# Patient Record
Sex: MALE | Race: WHITE | NOT HISPANIC OR LATINO | Employment: OTHER | URBAN - METROPOLITAN AREA
[De-identification: names, ages, dates, MRNs, and addresses within clinical notes are randomized per-mention and may not be internally consistent; named-entity substitution may affect disease eponyms.]

---

## 2017-01-27 DIAGNOSIS — I35.0 AORTIC VALVE STENOSIS, UNSPECIFIED ETIOLOGY: Primary | ICD-10-CM

## 2017-04-25 ENCOUNTER — HOSPITAL ENCOUNTER (OUTPATIENT)
Dept: CARDIOLOGY | Facility: CLINIC | Age: 67
Discharge: HOME | End: 2017-04-25
Payer: MEDICARE

## 2017-04-25 ENCOUNTER — OFFICE VISIT (OUTPATIENT)
Dept: CARDIOLOGY | Facility: CLINIC | Age: 67
End: 2017-04-25
Payer: MEDICARE

## 2017-04-25 VITALS
HEART RATE: 58 BPM | SYSTOLIC BLOOD PRESSURE: 140 MMHG | DIASTOLIC BLOOD PRESSURE: 82 MMHG | BODY MASS INDEX: 29.33 KG/M2 | WEIGHT: 198 LBS | HEIGHT: 69 IN

## 2017-04-25 DIAGNOSIS — I25.10 CORONARY ARTERY DISEASE INVOLVING NATIVE CORONARY ARTERY OF NATIVE HEART WITHOUT ANGINA PECTORIS: ICD-10-CM

## 2017-04-25 DIAGNOSIS — Z95.1 S/P CABG X 1: ICD-10-CM

## 2017-04-25 DIAGNOSIS — E66.3 OVERWEIGHT (BMI 25.0-29.9): ICD-10-CM

## 2017-04-25 DIAGNOSIS — E78.5 DYSLIPIDEMIA: ICD-10-CM

## 2017-04-25 DIAGNOSIS — Z95.2 S/P AVR (AORTIC VALVE REPLACEMENT): ICD-10-CM

## 2017-04-25 DIAGNOSIS — I35.0 AORTIC VALVE STENOSIS, UNSPECIFIED ETIOLOGY: ICD-10-CM

## 2017-04-25 DIAGNOSIS — I10 HTN (HYPERTENSION), BENIGN: ICD-10-CM

## 2017-04-25 PROCEDURE — 99999 PR PBB SHADOW E&M-EST. PATIENT-LVL III: CPT | Mod: PBBFAC,,, | Performed by: INTERNAL MEDICINE

## 2017-04-25 PROCEDURE — 3079F DIAST BP 80-89 MM HG: CPT | Mod: S$GLB,,, | Performed by: INTERNAL MEDICINE

## 2017-04-25 PROCEDURE — 1159F MED LIST DOCD IN RCRD: CPT | Mod: S$GLB,,, | Performed by: INTERNAL MEDICINE

## 2017-04-25 PROCEDURE — 99204 OFFICE O/P NEW MOD 45 MIN: CPT | Mod: S$GLB,,, | Performed by: INTERNAL MEDICINE

## 2017-04-25 PROCEDURE — 3077F SYST BP >= 140 MM HG: CPT | Mod: S$GLB,,, | Performed by: INTERNAL MEDICINE

## 2017-04-25 PROCEDURE — 1126F AMNT PAIN NOTED NONE PRSNT: CPT | Mod: S$GLB,,, | Performed by: INTERNAL MEDICINE

## 2017-04-25 PROCEDURE — 93000 ELECTROCARDIOGRAM COMPLETE: CPT | Mod: S$GLB,,, | Performed by: INTERNAL MEDICINE

## 2017-04-25 PROCEDURE — 1160F RVW MEDS BY RX/DR IN RCRD: CPT | Mod: S$GLB,,, | Performed by: INTERNAL MEDICINE

## 2017-04-25 RX ORDER — ASPIRIN 325 MG
325 TABLET, DELAYED RELEASE (ENTERIC COATED) ORAL DAILY
COMMUNITY
End: 2022-06-15

## 2017-04-25 RX ORDER — ATORVASTATIN CALCIUM 20 MG/1
TABLET, FILM COATED ORAL
Refills: 3 | COMMUNITY
Start: 2017-03-22 | End: 2017-04-25 | Stop reason: SDUPTHER

## 2017-04-25 RX ORDER — ATORVASTATIN CALCIUM 20 MG/1
TABLET, FILM COATED ORAL
Qty: 90 TABLET | Refills: 3 | Status: SHIPPED | OUTPATIENT
Start: 2017-04-25 | End: 2017-04-27 | Stop reason: DRUGHIGH

## 2017-04-25 RX ORDER — NITROGLYCERIN 0.4 MG/1
0.4 TABLET SUBLINGUAL EVERY 5 MIN PRN
Qty: 25 TABLET | Refills: 3 | Status: SHIPPED | OUTPATIENT
Start: 2017-04-25 | End: 2021-04-26

## 2017-04-25 RX ORDER — RIZATRIPTAN BENZOATE 10 MG/1
10 TABLET ORAL
COMMUNITY

## 2017-04-25 RX ORDER — DICLOFENAC SODIUM 75 MG/1
75 TABLET, DELAYED RELEASE ORAL 2 TIMES DAILY PRN
COMMUNITY
End: 2017-04-25 | Stop reason: ALTCHOICE

## 2017-04-25 RX ORDER — RIZATRIPTAN BENZOATE 10 MG/1
TABLET ORAL
Refills: 2 | COMMUNITY
Start: 2017-04-05 | End: 2017-04-25 | Stop reason: SDUPTHER

## 2017-04-25 RX ORDER — PANTOPRAZOLE SODIUM 40 MG/1
40 TABLET, DELAYED RELEASE ORAL EVERY MORNING
Refills: 5 | COMMUNITY
Start: 2017-04-06 | End: 2017-06-27 | Stop reason: CLARIF

## 2017-04-25 RX ORDER — AMOXICILLIN 500 MG/1
500 TABLET, FILM COATED ORAL EVERY 12 HOURS
Qty: 12 TABLET | Refills: 1 | Status: SHIPPED | OUTPATIENT
Start: 2017-04-25 | End: 2017-05-05

## 2017-04-25 RX ORDER — METOPROLOL TARTRATE 25 MG/1
12.5 TABLET, FILM COATED ORAL DAILY
Refills: 3 | COMMUNITY
Start: 2017-02-14 | End: 2017-04-25 | Stop reason: ALTCHOICE

## 2017-04-25 RX ORDER — GABAPENTIN 300 MG/1
CAPSULE ORAL
Refills: 2 | COMMUNITY
Start: 2017-04-05

## 2017-04-25 RX ORDER — CARVEDILOL 12.5 MG/1
12.5 TABLET ORAL 2 TIMES DAILY
Qty: 180 TABLET | Refills: 3 | Status: SHIPPED | OUTPATIENT
Start: 2017-04-25 | End: 2018-05-18 | Stop reason: SDUPTHER

## 2017-04-25 NOTE — PATIENT INSTRUCTIONS
Discussed diet , achieving and maintaining ideal body weight, and exercise.   We reviewed meds in detail.  Reassured  Carvedilol half of 12.5 twice instead of metoprolol  No Voltaren but can use Celebrex or Naproxen  Omega 3 at least 800 EPA/DHA  4 500 mg Amoxicillin 1 hour before dental work  Refill NTG every 6 months  ADAPTABLE Trial

## 2017-04-25 NOTE — PROGRESS NOTES
Subjective:   Patient ID:  Bryant King is a 66 y.o. male who presents for evaluation of CVD    HPI: He wanted evaluation of CVD.His wife Adeola come to see me. The patient has no chest pain, SOB, TIA, palpitations, syncope or pre-syncope.Patient currently exercises very few times per week.He said records faxed twice but I have none today for his visit.BP usually 120/80.        Review of Systems   Constitution: Negative for chills, decreased appetite, diaphoresis, fever, weakness, malaise/fatigue, night sweats, weight gain and weight loss.   HENT: Negative for congestion, headaches, hoarse voice, nosebleeds, sore throat and tinnitus.    Eyes: Negative for blurred vision, double vision, vision loss in left eye, vision loss in right eye, visual disturbance and visual halos.   Cardiovascular: Negative for chest pain, claudication, cyanosis, dyspnea on exertion, irregular heartbeat, leg swelling, near-syncope, orthopnea, palpitations, paroxysmal nocturnal dyspnea and syncope.   Respiratory: Negative for cough, hemoptysis, shortness of breath, sleep disturbances due to breathing, snoring, sputum production and wheezing.    Endocrine: Negative for cold intolerance, heat intolerance, polydipsia, polyphagia and polyuria.   Hematologic/Lymphatic: Negative for adenopathy and bleeding problem. Does not bruise/bleed easily.   Skin: Negative for color change, dry skin, flushing, itching, nail changes, poor wound healing, rash, skin cancer, suspicious lesions and unusual hair distribution.   Musculoskeletal: Negative for arthritis, back pain, falls, gout, joint pain, joint swelling, muscle cramps, muscle weakness, myalgias and stiffness.   Gastrointestinal: Negative for abdominal pain, anorexia, change in bowel habit, constipation, diarrhea, dysphagia, heartburn, hematemesis, hematochezia, melena and vomiting.   Genitourinary: Negative for decreased libido, dysuria, hematuria, hesitancy and urgency.   Neurological: Negative  "for excessive daytime sleepiness, dizziness, focal weakness, light-headedness, loss of balance, numbness, paresthesias, seizures, sensory change, tremors and vertigo.   Psychiatric/Behavioral: Negative for altered mental status, depression, hallucinations, memory loss, substance abuse and suicidal ideas. The patient does not have insomnia and is not nervous/anxious.    Allergic/Immunologic: Negative for environmental allergies and hives.       Objective: BP (!) 140/82 (BP Location: Left arm, Patient Position: Sitting, BP Method: Automatic)  Pulse (!) 58  Ht 5' 8.5" (1.74 m)  Wt 89.8 kg (197 lb 15.6 oz)  BMI 29.66 kg/m2     Physical Exam   Constitutional: He is oriented to person, place, and time. He appears well-developed and well-nourished. No distress.   HENT:   Head: Normocephalic.   Eyes: EOM are normal. Pupils are equal, round, and reactive to light.   Neck: Normal range of motion. No thyromegaly present.   Cardiovascular: Normal rate, regular rhythm and intact distal pulses.  Exam reveals no gallop and no friction rub.    Murmur heard.   Harsh midsystolic murmur is present  at the upper right sternal border radiating to the neck  Pulses:       Carotid pulses are 3+ on the right side, and 3+ on the left side.       Radial pulses are 3+ on the right side, and 3+ on the left side.        Femoral pulses are 3+ on the right side, and 3+ on the left side.       Popliteal pulses are 3+ on the right side, and 3+ on the left side.        Dorsalis pedis pulses are 3+ on the right side, and 3+ on the left side.        Posterior tibial pulses are 3+ on the right side, and 3+ on the left side.   Pulmonary/Chest: Effort normal and breath sounds normal. No respiratory distress. He has no wheezes. He has no rales. He exhibits no tenderness.   Abdominal: Soft. He exhibits no distension and no mass. There is no tenderness.   Musculoskeletal: Normal range of motion.   Lymphadenopathy:     He has no cervical adenopathy. "   Neurological: He is alert and oriented to person, place, and time.   Skin: Skin is warm. He is not diaphoretic. No cyanosis. Nails show no clubbing.   Psychiatric: He has a normal mood and affect. His speech is normal and behavior is normal. Judgment and thought content normal. Cognition and memory are normal.       Assessment:     1. Coronary artery disease involving native coronary artery of native heart without angina pectoris    2. HTN (hypertension), benign    3. Dyslipidemia    4. Overweight (BMI 25.0-29.9)    5. S/P CABG x 1    6. S/P AVR (aortic valve replacement)        Plan:   Discussed diet , achieving and maintaining ideal body weight, and exercise.   We reviewed meds in detail.  Reassured  Carvedilol half of 12.5 twice instead of metoprolol  No Voltaren but can use Celebrex or Naproxen  Omega 3 at least 800 EPA/DHA  4 500 mg Amoxicillin 1 hour before dental work  Refill NTG every 6 months  Bryant was seen today for coronary artery disease and establish care.    Diagnoses and all orders for this visit:    Coronary artery disease involving native coronary artery of native heart without angina pectoris  -     atorvastatin (LIPITOR) 20 MG tablet; TAKE 1 TABLET BY MOUTH DAILY anytime  -     carvedilol (COREG) 12.5 MG tablet; Take 1 tablet (12.5 mg total) by mouth 2 (two) times daily. .5-1 twice daily or as directed  -     nitroGLYCERIN (NITROSTAT) 0.4 MG SL tablet; Place 1 tablet (0.4 mg total) under the tongue every 5 (five) minutes as needed for Chest pain.  -     Lipid panel; Standing  -     Comprehensive metabolic panel; Standing  -     TSH; Standing  -     PSA, Screening; Standing  -     Exercise stress echo with color flow; Future; Expected date: 6/6/17    HTN (hypertension), benign  -     carvedilol (COREG) 12.5 MG tablet; Take 1 tablet (12.5 mg total) by mouth 2 (two) times daily. .5-1 twice daily or as directed  -     nitroGLYCERIN (NITROSTAT) 0.4 MG SL tablet; Place 1 tablet (0.4 mg total) under  the tongue every 5 (five) minutes as needed for Chest pain.  -     Comprehensive metabolic panel; Standing  -     TSH; Standing  -     Exercise stress echo with color flow; Future; Expected date: 6/6/17    Dyslipidemia  -     atorvastatin (LIPITOR) 20 MG tablet; TAKE 1 TABLET BY MOUTH DAILY anytime  -     Lipid panel; Standing  -     Comprehensive metabolic panel; Standing  -     TSH; Standing    Overweight (BMI 25.0-29.9)  -     TSH; Standing    S/P CABG x 1  -     atorvastatin (LIPITOR) 20 MG tablet; TAKE 1 TABLET BY MOUTH DAILY anytime  -     nitroGLYCERIN (NITROSTAT) 0.4 MG SL tablet; Place 1 tablet (0.4 mg total) under the tongue every 5 (five) minutes as needed for Chest pain.  -     Exercise stress echo with color flow; Future; Expected date: 6/6/17    S/P AVR (aortic valve replacement)  -     atorvastatin (LIPITOR) 20 MG tablet; TAKE 1 TABLET BY MOUTH DAILY anytime  -     amoxicillin (AMOXIL) 500 MG Tab; Take 1 tablet (500 mg total) by mouth every 12 (twelve) hours. 4 pills 1 hour before dental  -     Exercise stress echo with color flow; Future; Expected date: 6/6/17    Other orders  -     gabapentin (NEURONTIN) 300 MG capsule; TAKE 2 CAPSULES BY MOUTH 3 TIMES A DAY  -     Discontinue: metoprolol tartrate (LOPRESSOR) 25 MG tablet; Take 12.5 mg by mouth once daily.  -     Discontinue: atorvastatin (LIPITOR) 20 MG tablet; TAKE 1 TABLET BY MOUTH DAILY AT BEDTIME.  -     pantoprazole (PROTONIX) 40 MG tablet; Take 40 mg by mouth every morning.  -     Discontinue: rizatriptan (MAXALT) 10 MG tablet; TAKE 1 TABLET BY MOUTH AS NEEDED FOR HEADACHE THEN REPEAT 2 HOURS IF NEEDED  -     aspirin (ECOTRIN) 325 MG EC tablet; Take 325 mg by mouth once daily.  -     rizatriptan (MAXALT) 10 MG tablet; Take 10 mg by mouth as needed for Migraine.  -     testosterone cypionate 200 mg/mL Kit; Inject into the muscle.  -     Discontinue: diclofenac (VOLTAREN) 75 MG EC tablet; Take 75 mg by mouth 2 (two) times daily as  needed.        ADAPTABLE Trial        Return in about 1 year (around 4/25/2018) for with labs;labs [place] tomorrow; Ex CFD Deejay Villalba to read in June.

## 2017-04-25 NOTE — MR AVS SNAPSHOT
[facility]  [address]  [facility] [address]  Phone: [phone]                  Bryant King   2017 8:30 AM   Office Visit    Description:  Male : 1950   Provider:  Joo Villalba MD   Department:  [facility]           Reason for Visit     Coronary Artery Disease     Establish Care           Diagnoses this Visit        Comments    Coronary artery disease involving native coronary artery of native heart without angina pectoris         HTN (hypertension), benign         Dyslipidemia         Overweight (BMI 25.0-29.9)         S/P CABG x 1         S/P AVR (aortic valve replacement)                To Do List           Future Appointments        Provider Department Dept Phone    2017 7:45 AM LAB, [facility] [phone]    2017 8:15 AM LAB, [facility] [phone]    2017 8:30 AM EXERCISE, STRESS ECHO [facility] [phone]      Goals (5 Years of Data)     None      Follow-Up and Disposition     Return in about 1 year (around 2018) for with labs;labs [place] tomorrow; Ex ANGÉLICA Villalba to read in .    Follow-up and Disposition History       These Medications        Disp Refills Start End    atorvastatin (LIPITOR) 20 MG tablet 90 tablet 3 2017     TAKE 1 TABLET BY MOUTH DAILY anytime    Pharmacy: [facility]/pharmacy #[id number] - [place]  [address] AT [facility] Ph #: [phone]       carvedilol (COREG) 12.5 MG tablet 180 tablet 3 2017     Take 1 tablet (12.5 mg total) by mouth 2 (two) times daily. .5-1 twice daily or as directed - Oral    Pharmacy: [facility]/pharmacy #[id number] - [place] [address] AT [facility] Ph #: [phone]       nitroGLYCERIN (NITROSTAT) 0.4 MG SL tablet 25 tablet 3 2017    Place 1 tablet (0.4 mg total) under the tongue every 5 (five) minutes as needed for Chest pain. -  Sublingual    Pharmacy: [facility]/pharmacy #[id number] - [place] - [address] AT [facility] Ph #: [phone]       amoxicillin (AMOXIL) 500 MG Tab 12 tablet 1 4/25/2017 5/5/2017    Take 1 tablet (500 mg total) by mouth every 12 (twelve) hours. 4 pills 1 hour before dental - Oral    Pharmacy: [facility]/pharmacy #[id number] - [place], [place] - [address] AT [facility] Ph #: [phone]         [facility]     [facility] On Call Nurse Care Line - 24/7 Assistance  Unless otherwise directed by your provider, please contact [facility], our nurse care line that is available for 24/7 assistance.     Registered nurses in the [facility] provide: appointment scheduling, clinical advisement, health education, and other advisory services.  Call: [phone] (toll free)               Medications           START taking these NEW medications        Refills    atorvastatin (LIPITOR) 20 MG tablet 3    Sig: TAKE 1 TABLET BY MOUTH DAILY anytime    Class: Normal    carvedilol (COREG) 12.5 MG tablet 3    Sig: Take 1 tablet (12.5 mg total) by mouth 2 (two) times daily. .5-1 twice daily or as directed    Class: Normal    Route: Oral    nitroGLYCERIN (NITROSTAT) 0.4 MG SL tablet 3    Sig: Place 1 tablet (0.4 mg total) under the tongue every 5 (five) minutes as needed for Chest pain.    Class: Normal    Route: Sublingual    amoxicillin (AMOXIL) 500 MG Tab 1    Sig: Take 1 tablet (500 mg total) by mouth every 12 (twelve) hours. 4 pills 1 hour before dental    Class: Normal    Route: Oral      STOP taking these medications     diclofenac (VOLTAREN) 75 MG EC tablet Take 75 mg by mouth 2 (two) times daily as needed.    metoprolol tartrate (LOPRESSOR) 25 MG tablet Take 12.5 mg by mouth once daily.           Verify that the below list of medications is an accurate representation of the medications you are currently taking.  If none reported, the list may be blank. If incorrect, please contact  "your healthcare provider. Carry this list with you in case of emergency.           Current Medications     aspirin (ECOTRIN) 325 MG EC tablet Take 325 mg by mouth once daily.    atorvastatin (LIPITOR) 20 MG tablet TAKE 1 TABLET BY MOUTH DAILY anytime    gabapentin (NEURONTIN) 300 MG capsule TAKE 2 CAPSULES BY MOUTH 3 TIMES A DAY    pantoprazole (PROTONIX) 40 MG tablet Take 40 mg by mouth every morning.    rizatriptan (MAXALT) 10 MG tablet Take 10 mg by mouth as needed for Migraine.    testosterone cypionate 200 mg/mL Kit Inject into the muscle.    amoxicillin (AMOXIL) 500 MG Tab Take 1 tablet (500 mg total) by mouth every 12 (twelve) hours. 4 pills 1 hour before dental    carvedilol (COREG) 12.5 MG tablet Take 1 tablet (12.5 mg total) by mouth 2 (two) times daily. .5-1 twice daily or as directed    nitroGLYCERIN (NITROSTAT) 0.4 MG SL tablet Place 1 tablet (0.4 mg total) under the tongue every 5 (five) minutes as needed for Chest pain.           Clinical Reference Information           Your Vitals Were     BP Pulse Height Weight BMI    140/82 (BP Location: Left arm, Patient Position: Sitting, BP Method: Automatic) 58 5' 8.5" (1.74 m) 89.8 kg (197 lb 15.6 oz) 29.66 kg/m2      Blood Pressure          Most Recent Value    Left Arm BP - Sitting  140/82    BP  (!)  140/82      Allergies as of 4/25/2017     No Known Allergies      Immunizations Administered on Date of Encounter - 4/25/2017     None      Orders Placed During Today's Visit     Future Labs/Procedures Expected by Expires    Exercise stress echo with color flow  6/6/2017 (Approximate) 6/30/2017    Recurring Lab Work Interval Expires    Comprehensive metabolic panel   4/25/2018    Lipid panel   4/25/2018    PSA, Screening   4/25/2018    TSH   4/25/2018      [facility] Sign-Up     Activating your [facility] account is as easy as 1-2-3!     1) Visit [email or web address], select Sign Up Now, enter this activation code and your date of birth, then select " Next.  [email or web address]  Expires: 6/9/2017  8:08 AM      2) Create a username and password to use when you visit [facility] in the future and select a security question in case you lose your password and select Next.    3) Enter your e-mail address and click Sign Up!    Additional Information  If you have questions, please e-mail [email or web address] or call [phone] to talk to our [facility] staff. Remember, [facility] is NOT to be used for urgent needs. For medical emergencies, dial 911.         Instructions    Discussed diet , achieving and maintaining ideal body weight, and exercise.   We reviewed meds in detail.  Reassured  Carvedilol half of 12.5 twice instead of metoprolol  No Voltaren but can use Celebrex or Naproxen  Omega 3 at least 800 EPA/DHA  4 500 mg Amoxicillin 1 hour before dental work  Refill NTG every 6 months  ADAPTABLE Trial         Language Assistance Services     ATTENTION: Language assistance services are available, free of charge. Please call [phone].      ATENCIÓN: Si habla español, tiene a sanchez disposición servicios gratuitos de asistencia lingüística. Llame al [phone].     FERCHO Ý: N?u b?n nói Ti?ng Vi?t, có các d?ch v? h? tr? ngôn ng? mi?n phí dành cho b?n. G?i s? [phone].         [facility] complies with applicable Federal civil rights laws and does not discriminate on the basis of race, color, national origin, age, disability, or sex.

## 2017-04-26 ENCOUNTER — LAB VISIT (OUTPATIENT)
Dept: LAB | Facility: HOSPITAL | Age: 67
End: 2017-04-26
Attending: INTERNAL MEDICINE
Payer: MEDICARE

## 2017-04-26 DIAGNOSIS — E78.5 DYSLIPIDEMIA: ICD-10-CM

## 2017-04-26 DIAGNOSIS — E66.3 OVERWEIGHT (BMI 25.0-29.9): ICD-10-CM

## 2017-04-26 DIAGNOSIS — I25.10 CORONARY ARTERY DISEASE INVOLVING NATIVE CORONARY ARTERY OF NATIVE HEART WITHOUT ANGINA PECTORIS: ICD-10-CM

## 2017-04-26 DIAGNOSIS — I10 HTN (HYPERTENSION), BENIGN: ICD-10-CM

## 2017-04-26 LAB
ALBUMIN SERPL BCP-MCNC: 3.8 G/DL
ALP SERPL-CCNC: 54 U/L
ALT SERPL W/O P-5'-P-CCNC: 39 U/L
ANION GAP SERPL CALC-SCNC: 8 MMOL/L
AST SERPL-CCNC: 25 U/L
BILIRUB SERPL-MCNC: 0.6 MG/DL
BUN SERPL-MCNC: 16 MG/DL
CALCIUM SERPL-MCNC: 9.5 MG/DL
CHLORIDE SERPL-SCNC: 110 MMOL/L
CHOLEST/HDLC SERPL: 4.2 {RATIO}
CO2 SERPL-SCNC: 29 MMOL/L
COMPLEXED PSA SERPL-MCNC: 0.91 NG/ML
CREAT SERPL-MCNC: 1.4 MG/DL
EST. GFR  (AFRICAN AMERICAN): 59.7 ML/MIN/1.73 M^2
EST. GFR  (NON AFRICAN AMERICAN): 51.6 ML/MIN/1.73 M^2
GLUCOSE SERPL-MCNC: 136 MG/DL
HDL/CHOLESTEROL RATIO: 24 %
HDLC SERPL-MCNC: 121 MG/DL
HDLC SERPL-MCNC: 29 MG/DL
LDLC SERPL CALC-MCNC: 69.8 MG/DL
NONHDLC SERPL-MCNC: 92 MG/DL
POTASSIUM SERPL-SCNC: 4.3 MMOL/L
PROT SERPL-MCNC: 7.2 G/DL
SODIUM SERPL-SCNC: 147 MMOL/L
TRIGL SERPL-MCNC: 111 MG/DL
TSH SERPL DL<=0.005 MIU/L-ACNC: 1.94 UIU/ML

## 2017-04-26 PROCEDURE — 36415 COLL VENOUS BLD VENIPUNCTURE: CPT | Mod: PO

## 2017-04-26 PROCEDURE — 80053 COMPREHEN METABOLIC PANEL: CPT

## 2017-04-26 PROCEDURE — 80061 LIPID PANEL: CPT

## 2017-04-26 PROCEDURE — 84443 ASSAY THYROID STIM HORMONE: CPT

## 2017-04-26 PROCEDURE — 84153 ASSAY OF PSA TOTAL: CPT

## 2017-04-27 ENCOUNTER — TELEPHONE (OUTPATIENT)
Dept: CARDIOLOGY | Facility: CLINIC | Age: 67
End: 2017-04-27

## 2017-04-27 RX ORDER — ATORVASTATIN CALCIUM 40 MG/1
40 TABLET, FILM COATED ORAL DAILY
COMMUNITY
End: 2018-05-18 | Stop reason: SDUPTHER

## 2017-04-27 NOTE — TELEPHONE ENCOUNTER
Results of labs done yesterday were given to patient and he verbalized understanding.      Notes Recorded by Joo Villalba MD on 4/26/2017 at 8:01 PM  Release-tell him bad cholesterol is where we want it but since HDL is so low,  Let's double the dose of the statin 4 days per week and the other 3  The same as now-he will probably want the higher strength. Comp and lipids  In 4 months.  ------    Notes Recorded by Joo Villalba MD on 4/26/2017 at 5:21 PM  PSA normal-waiting for the rest.

## 2017-05-30 ENCOUNTER — TELEPHONE (OUTPATIENT)
Dept: CARDIOLOGY | Facility: CLINIC | Age: 67
End: 2017-05-30

## 2017-05-31 ENCOUNTER — TELEPHONE (OUTPATIENT)
Dept: CARDIOLOGY | Facility: CLINIC | Age: 67
End: 2017-05-31

## 2017-05-31 NOTE — TELEPHONE ENCOUNTER
----- Message from Joana Abdalla sent at 5/30/2017  9:18 AM CDT -----  Contact: Wife of pt called  Wife of pt called, states pt is having hernia surgery at [facility] and is needing a clearance. She is asking to speak with you today. Pt of Dr. Villalba and LOV 4/25/17. Ph for wife is [phone]. Thank you

## 2017-05-31 NOTE — TELEPHONE ENCOUNTER
Returned pt's call. He needs clearance note from Dr. Villalba to have hernia surgery on 6/30/17. Will be done under general anesthesia. Need to fax to Dr. Rodríguez Villa at [facility] ( fax # [phone] / ph# [phone] ).

## 2017-05-31 NOTE — TELEPHONE ENCOUNTER
Clearance note for hernia surgery was sent to Dr.Ryan Villa at [phone].    MD Syeda Douglas MA   Caller: Wife of pt called             He is cleared for A/S and can hold his aspirin if needed 3-5 days before, but should  take cardiac meds am of surgery,CJL    Previous Messages      ----- Message -----   From: Syeda Nguyen MA   Sent: 5/30/2017   8:08 PM   To: MD Juan Douglas - Please put clearance note in chart. Will fax to Dr.Ryan Villa at [phone] ( ph# [phone]).   Thanks     ----- Message -----   From: Joana Abdalla   Sent: 5/30/2017   9:18 AM   To: Deejay [facility] Staff     Wife of pt called, states pt is having hernia surgery at [facility] and is needing a clearance. She is asking to speak with you today. Pt of Dr. Villalba and LOV 4/25/17. Ph for wife is [phone]. Thank you

## 2017-05-31 NOTE — TELEPHONE ENCOUNTER
----- Message from Joo Villalba MD sent at 5/31/2017 11:25 AM CDT -----  Contact: Wife of pt called  He is cleared for A/S and can hold his aspirin if needed 3-5 days before, but should  take cardiac meds am of surgery,CJ  ----- Message -----  From: Syeda Nguyen MA  Sent: 5/30/2017   8:08 PM  To: MD Juan Douglas - Please put clearance note in chart. Will fax to Dr.Ryan Villa at [phone] ( ph# [phone]).  Thanks    ----- Message -----  From: Joana Abdalal  Sent: 5/30/2017   9:18 AM  To: Deejay [facility] Staff    Wife of pt called, states pt is having hernia surgery at [facility] and is needing a clearance. She is asking to speak with you today. Pt of Dr. Villalba and LOV 4/25/17. Ph for wife is [phone]. Thank you

## 2017-06-02 ENCOUNTER — TELEPHONE (OUTPATIENT)
Dept: CARDIOLOGY | Facility: CLINIC | Age: 67
End: 2017-06-02

## 2017-06-13 ENCOUNTER — TELEPHONE (OUTPATIENT)
Dept: SURGERY | Facility: CLINIC | Age: 67
End: 2017-06-13

## 2017-06-13 ENCOUNTER — OFFICE VISIT (OUTPATIENT)
Dept: SURGERY | Facility: CLINIC | Age: 67
End: 2017-06-13
Payer: MEDICARE

## 2017-06-13 VITALS
WEIGHT: 198 LBS | TEMPERATURE: 99 F | HEART RATE: 54 BPM | DIASTOLIC BLOOD PRESSURE: 89 MMHG | HEIGHT: 69 IN | SYSTOLIC BLOOD PRESSURE: 127 MMHG | BODY MASS INDEX: 29.33 KG/M2

## 2017-06-13 DIAGNOSIS — K44.9 HERNIA, HIATAL: ICD-10-CM

## 2017-06-13 PROCEDURE — 99999 PR PBB SHADOW E&M-EST. PATIENT-LVL III: CPT | Mod: PBBFAC,,, | Performed by: SURGERY

## 2017-06-13 PROCEDURE — 1159F MED LIST DOCD IN RCRD: CPT | Mod: S$GLB,,, | Performed by: SURGERY

## 2017-06-13 PROCEDURE — 99203 OFFICE O/P NEW LOW 30 MIN: CPT | Mod: S$GLB,,, | Performed by: SURGERY

## 2017-06-13 RX ORDER — METOPROLOL TARTRATE 25 MG/1
12.5 TABLET, FILM COATED ORAL DAILY
Refills: 3 | COMMUNITY
Start: 2017-04-26 | End: 2017-06-27 | Stop reason: CLARIF

## 2017-06-13 RX ORDER — AMOXICILLIN 500 MG/1
CAPSULE ORAL
Refills: 1 | COMMUNITY
Start: 2017-04-25 | End: 2019-12-10

## 2017-06-13 NOTE — PROGRESS NOTES
History & Physical    SUBJECTIVE:     History of Present Illness:  Patient is a 66 y.o. male presents with hiatal hernia.  His chief complaint is dysphagia to fluids especially when he drinks too fast.  He has to eat solids slowly.  He has had worsening trouble over the last 2 years and in particular the last 6 months.  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  He denies heartburn, has occasional foamy regurgitation, has hoarseness, has sore throat, denies asthma and denies cough.  His mother had trouble with a hiatal hernia.      Chief Complaint   Patient presents with    Consult     hiatal hernia       Review of patient's allergies indicates:  No Known Allergies    Current Outpatient Prescriptions   Medication Sig Dispense Refill    amoxicillin (AMOXIL) 500 MG capsule TAKE ONE CAPSULE BY MOUTH EVERY 12 HOURS AND 4CAPS 1 HOUR BEFORE DENTAL APPT  1    aspirin (ECOTRIN) 325 MG EC tablet Take 325 mg by mouth once daily.      atorvastatin (LIPITOR) 40 MG tablet Take 40 mg by mouth once daily. One a day or as directed      carvedilol (COREG) 12.5 MG tablet Take 1 tablet (12.5 mg total) by mouth 2 (two) times daily. .5-1 twice daily or as directed 180 tablet 3    gabapentin (NEURONTIN) 300 MG capsule TAKE 2 CAPSULES BY MOUTH 3 TIMES A DAY  2    metoprolol tartrate (LOPRESSOR) 25 MG tablet Take 12.5 mg by mouth once daily.  3    nitroGLYCERIN (NITROSTAT) 0.4 MG SL tablet Place 1 tablet (0.4 mg total) under the tongue every 5 (five) minutes as needed for Chest pain. 25 tablet 3    pantoprazole (PROTONIX) 40 MG tablet Take 40 mg by mouth every morning.  5    rizatriptan (MAXALT) 10 MG tablet Take 10 mg by mouth as needed for Migraine.      testosterone cypionate 200 mg/mL Kit Inject into the muscle.       No current facility-administered medications for this visit.        Past Medical History:   Diagnosis Date    Coronary artery disease     Hyperlipidemia     Hypertension     Migraines      Past  "Surgical History:   Procedure Laterality Date    AORTIC VALVE REPLACEMENT      back fusion      CORONARY ARTERY BYPASS GRAFT  02/08/2012    HERNIA REPAIR      umbilical hernia repair with possible mesh    PATELLA FRACTURE SURGERY      TONSILLECTOMY       Family History   Problem Relation Age of Onset    Hypertension Mother     Hypertension Father     Heart disease Brother     Heart attack Neg Hx      Social History   Substance Use Topics    Smoking status: Never Smoker    Smokeless tobacco: Not on file    Alcohol use No        Review of Systems:  Review of Systems   Constitutional: Negative for fever and unexpected weight change.        Has lost 12 pounds with dieting over the last 1.5 years   Respiratory: Negative for chest tightness.    Cardiovascular: Negative for chest pain.        Has left leg swelling after saphenous vein harvest for cabg   Gastrointestinal: Negative for constipation, diarrhea, nausea and vomiting.   Genitourinary: Negative for difficulty urinating and dysuria.   Skin: Negative for rash and wound.   Neurological: Positive for headaches. Negative for seizures.        Has migraines   Hematological:        Has easy bruising but no easy bleeding       OBJECTIVE:     Vital Signs (Most Recent)  Temp: 98.6 °F (37 °C) (06/13/17 0958)  Pulse: (!) 54 (06/13/17 0958)  BP: 127/89 (06/13/17 0958)  5' 9" (1.753 m)  89.8 kg (198 lb)     Physical Exam:  Physical Exam   Constitutional: He is oriented to person, place, and time. He appears well-developed and well-nourished.   Neck: Normal range of motion. Neck supple.   Cardiovascular: Normal rate, regular rhythm and normal heart sounds.    Pulmonary/Chest: Effort normal and breath sounds normal.   Abdominal: Soft. Bowel sounds are normal. He exhibits no distension. There is no tenderness.   Neurological: He is alert and oriented to person, place, and time.   Skin: Skin is warm and dry.   Psychiatric: He has a normal mood and affect. His behavior " is normal. Judgment and thought content normal.   Vitals reviewed.      Laboratory  None available    Diagnostic Results:  None available    ASSESSMENT/PLAN:     Symptomatic hiatal hernia    PLAN:Plan     Obtain outside results of ugi, egd and motility study.  Awaiting echo.  For lap hh with possible mesh, possible esophageal lengthening and Toupet fundoplication.  Cardiology clearance.

## 2017-06-13 NOTE — LETTER
[facility] - General Surgery  [address]  [place] [address]  Phone: [phone] June 13, 2017      Joo Villalba MD  [address]  [place] [address]    Patient: Bryant King   MR Number: [id number]   YOB: 1950   Date of Visit: 6/13/2017     Dear Dr. Villalba:    Thank you for referring Bryant King to me for evaluation. Below are the relevant portions of my assessment and plan of care.    Patient presents with symptomatic hiatal hernia.     PLAN: Obtain outside results of UGI,EGD and motility study.  Awaiting echo.  For lap HH with possible mesh, possible esophageal lengthening and Toupet fundoplication. Cardiology clearance.    If you have questions, please do not hesitate to call me. I look forward to following Bryant along with you.    Sincerely,      Bill Jara MD   Section Head - General, Laparoscopic, Bariatric  Acute Care and Oncologic Surgery   - Surgical Weight Loss Program  [facility]    WSR/margot    CC  Duane Clifford MD

## 2017-06-14 ENCOUNTER — HOSPITAL ENCOUNTER (OUTPATIENT)
Dept: CARDIOLOGY | Facility: CLINIC | Age: 67
Discharge: HOME | End: 2017-06-14
Payer: MEDICARE

## 2017-06-14 ENCOUNTER — TELEPHONE (OUTPATIENT)
Dept: CARDIOLOGY | Facility: CLINIC | Age: 67
End: 2017-06-14

## 2017-06-14 DIAGNOSIS — Z95.1 S/P CABG X 1: ICD-10-CM

## 2017-06-14 DIAGNOSIS — Z95.2 S/P AVR (AORTIC VALVE REPLACEMENT): ICD-10-CM

## 2017-06-14 DIAGNOSIS — I10 HTN (HYPERTENSION), BENIGN: ICD-10-CM

## 2017-06-14 DIAGNOSIS — I25.10 CORONARY ARTERY DISEASE INVOLVING NATIVE CORONARY ARTERY OF NATIVE HEART WITHOUT ANGINA PECTORIS: ICD-10-CM

## 2017-06-14 LAB
DIASTOLIC DYSFUNCTION: NO
ESTIMATED PA SYSTOLIC PRESSURE: 20.31
MITRAL VALVE REGURGITATION: NORMAL
RETIRED EF AND QEF - SEE NOTES: 63 (ref 55–65)
TRICUSPID VALVE REGURGITATION: NORMAL

## 2017-06-14 PROCEDURE — 93351 STRESS TTE COMPLETE: CPT | Mod: S$GLB,,, | Performed by: INTERNAL MEDICINE

## 2017-06-14 PROCEDURE — 93325 DOPPLER ECHO COLOR FLOW MAPG: CPT | Mod: S$GLB,,, | Performed by: INTERNAL MEDICINE

## 2017-06-14 PROCEDURE — 93320 DOPPLER ECHO COMPLETE: CPT | Mod: S$GLB,,, | Performed by: INTERNAL MEDICINE

## 2017-06-14 NOTE — TELEPHONE ENCOUNTER
Message   Received: Today   Message Contents   MD Syeda Douglas MA; Izabella Suarez RN             He is cleared,CJL    Previous Messages      ----- Message -----   From: Syeda Nguyen MA   Sent: 6/14/2017  12:14 PM   To: MD Dr. Deejay Douglas - Please write clearance note.   Thank you.     Message   Received: Today   Message Contents   Izabella Suarez RN  P Deejay [employer] Staff   Caller: Unspecified (Today, 11:49 AM)           Pt is scheduled to have a hiatal hernia repair with toupet fundoplication on 6/28 with Dr. Jara.  Dr. Jara is requesting that pt is cleared by Dr. Villalba prior to proceeding.       Can pt be cleared by recent clinic visit and echo stress test?

## 2017-06-14 NOTE — TELEPHONE ENCOUNTER
Dr Villalba reviewed the echo stress test and said that the echo and stress was excellent.       Left message for patient to call back

## 2017-06-27 ENCOUNTER — TELEPHONE (OUTPATIENT)
Dept: SURGERY | Facility: CLINIC | Age: 67
End: 2017-06-27

## 2017-06-27 ENCOUNTER — ANESTHESIA EVENT (OUTPATIENT)
Dept: SURGERY | Facility: HOSPITAL | Age: 67
DRG: 328 | End: 2017-06-27
Payer: MEDICARE

## 2017-06-27 NOTE — ANESTHESIA PREPROCEDURE EVALUATION
2017  Bryant King is a 66 y.o., male.  Pre-operative evaluation for Procedure(s) (LRB):  REPAIR-HERNIA-HIATAL-LAPAROSCOPIC w/ poss. mesh (N/A)  [id number] (N/A)  LENGTHENING-ESOPHAGUS (N/A)    Bryant King is a 66 y.o. male with PMHx significant for HTN, HLD, CAD s/p CABG (12), AS s/p prosthetic valve (CARLITOS: 1.78 cm^2; corrected for BSA: .86; MmmHg) with hiatal hernia scheduled for the above procedure.     LDA:  L hand 18g PIV    CARDIOLOGY CLEARANCE  He is cleared for A/S and can hold his aspirin if needed 3-5 days before, but should  take cardiac meds am of surgery,CJL     Patient Active Problem List   Diagnosis    Coronary artery disease involving native coronary artery    HTN (hypertension), benign    Dyslipidemia    Overweight (BMI 25.0-29.9)    S/P CABG x 1    S/P AVR (aortic valve replacement)    Hernia, hiatal       Review of patient's allergies indicates:  No Known Allergies    No current facility-administered medications on file prior to encounter.      Current Outpatient Prescriptions on File Prior to Encounter   Medication Sig Dispense Refill    aspirin (ECOTRIN) 325 MG EC tablet Take 325 mg by mouth once daily.      atorvastatin (LIPITOR) 40 MG tablet Take 40 mg by mouth once daily. One a day or as directed      carvedilol (COREG) 12.5 MG tablet Take 1 tablet (12.5 mg total) by mouth 2 (two) times daily. .5-1 twice daily or as directed 180 tablet 3    gabapentin (NEURONTIN) 300 MG capsule TAKE 2 CAPSULES BY MOUTH 3 TIMES A DAY  2    testosterone cypionate 200 mg/mL Kit Inject into the muscle.      amoxicillin (AMOXIL) 500 MG capsule TAKE ONE CAPSULE BY MOUTH EVERY 12 HOURS AND 4CAPS 1 HOUR BEFORE DENTAL APPT  1    nitroGLYCERIN (NITROSTAT) 0.4 MG SL tablet Place 1 tablet (0.4 mg total) under the tongue every 5 (five) minutes as needed  for Chest pain. 25 tablet 3    rizatriptan (MAXALT) 10 MG tablet Take 10 mg by mouth as needed for Migraine.         Past Surgical History:   Procedure Laterality Date    AORTIC VALVE REPLACEMENT      back fusion      CORONARY ARTERY BYPASS GRAFT  2012    HERNIA REPAIR      umbilical hernia repair with possible mesh    PATELLA FRACTURE SURGERY      TONSILLECTOMY         Social History     Social History    Marital status:      Spouse name: N/A    Number of children: N/A    Years of education: N/A     Occupational History    Not on file.     Social History Main Topics    Smoking status: Never Smoker    Smokeless tobacco: Not on file    Alcohol use No    Drug use: Unknown    Sexual activity: Not on file     Other Topics Concern    Not on file     Social History Narrative    No narrative on file         Vital Signs Range (Last 24H):         CBC: No results for input(s): WBC, RBC, HGB, HCT, PLT, MCV, MCH, MCHC in the last 72 hours.    CMP: No results for input(s): NA, K, CL, CO2, BUN, CREATININE, GLU, MG, PHOS, CALCIUM, ALBUMIN, PROT, ALKPHOS, ALT, AST, BILITOT in the last 72 hours.    INR  No results for input(s): INR, PROTIME, APTT in the last 72 hours.    Invalid input(s): PT        Diagnostic Studies:      EK17  Vent. Rate : 056 BPM     Atrial Rate : 056 BPM     P-R Int : 172 ms          QRS Dur : 114 ms      QT Int : 446 ms       P-R-T Axes : 057 -47 011 degrees     QTc Int : 430 ms    Sinus bradycardia  Left anterior fascicular block  Abnormal ECG    2D Echo: 17  CONCLUSIONS     1 - Normal left ventricular systolic function (EF 60-65%).     2 - Wall motion abnormalities.     3 - Normal left ventricular diastolic function.     4 - Right atrial enlargement.     5 - Normal right ventricular systolic function .     6 - Aortic valve prosthesis, effective prosthetic valve area corrected for BSA is 0.86 cm2.     7 - Mild to moderate mitral regurgitation.     8 - Trivial to mild  tricuspid regurgitation.     9 - Trivial to mild pulmonic regurgitation.     10 - The estimated PA systolic pressure is 20 mmHg.     No evidence of stress induced myocardial ischemia.     Anesthesia Evaluation    I have reviewed the Patient Summary Reports.    I have reviewed the Nursing Notes.   I have reviewed the Medications.     Review of Systems  Anesthesia Hx:  No problems with previous Anesthesia Denies Hx of Anesthetic complications  History of prior surgery of interest to airway management or planning: heart surgery. Previous anesthesia: General 2012: AVR and 1vCABG with general anesthesia.  Denies Family Hx of Anesthesia complications.   Denies Personal Hx of Anesthesia complications.   Cardiovascular:   Hypertension Valvular problems/Murmurs, AS CAD  CABG/stent  hyperlipidemia    Hepatic/GI:   Hiatal Hernia,        Physical Exam  General:  Well nourished    Airway/Jaw/Neck:  Airway Findings: Mouth Opening: Normal General Airway Assessment: Adult  Mallampati: II  Improves to I with phonation.  TM Distance: Normal, at least 6 cm      Dental:  Dental Findings: In tact   Chest/Lungs:  Chest/Lungs Clear    Heart/Vascular:  Heart Findings: Normal       Mental Status:  Mental Status Findings:  Cooperative, Alert and Oriented         Anesthesia Plan  Type of Anesthesia, risks & benefits discussed:  Anesthesia Type:  general  Patient's Preference:   Intra-op Monitoring Plan: standard ASA monitors  Intra-op Monitoring Plan Comments:   Post Op Pain Control Plan: multimodal analgesia, per primary service following discharge from PACU and IV/PO Opioids PRN  Post Op Pain Control Plan Comments:   Induction:   IV  Beta Blocker:  Patient is on a Beta-Blocker and has received one dose within the past 24 hours (No further documentation required).       Informed Consent: Patient understands risks and agrees with Anesthesia plan.  Questions answered. Anesthesia consent signed with patient.  ASA Score: 3     Day of Surgery  Review of History & Physical:    H&P update referred to the surgeon.     Anesthesia Plan Notes: Pt stopped his ASA 325mg Sunday (4 days prior) and took his Coreg this AM.         Ready For Surgery From Anesthesia Perspective.

## 2017-06-27 NOTE — ANESTHESIA PREPROCEDURE EVALUATION
06/27/2017  Bryant King is a 66 y.o., male  pmhx CAD s/p CABG, s/p aortic valve replacement, HTN, HLD presenting for the following  Pre-operative evaluation for Procedure(s) (LRB):  REPAIR-HERNIA-HIATAL-LAPAROSCOPIC w/ poss. mesh (N/A)  [id number] (N/A)  LENGTHENING-ESOPHAGUS (N/A)      Patient Active Problem List   Diagnosis    Coronary artery disease involving native coronary artery    HTN (hypertension), benign    Dyslipidemia    Overweight (BMI 25.0-29.9)    S/P CABG x 1    S/P AVR (aortic valve replacement)    Hernia, hiatal       Review of patient's allergies indicates:  No Known Allergies    No current facility-administered medications on file prior to encounter.      Current Outpatient Prescriptions on File Prior to Encounter   Medication Sig Dispense Refill    aspirin (ECOTRIN) 325 MG EC tablet Take 325 mg by mouth once daily.      atorvastatin (LIPITOR) 40 MG tablet Take 40 mg by mouth once daily. One a day or as directed      carvedilol (COREG) 12.5 MG tablet Take 1 tablet (12.5 mg total) by mouth 2 (two) times daily. .5-1 twice daily or as directed 180 tablet 3    gabapentin (NEURONTIN) 300 MG capsule TAKE 2 CAPSULES BY MOUTH 3 TIMES A DAY  2    testosterone cypionate 200 mg/mL Kit Inject into the muscle.      amoxicillin (AMOXIL) 500 MG capsule TAKE ONE CAPSULE BY MOUTH EVERY 12 HOURS AND 4CAPS 1 HOUR BEFORE DENTAL APPT  1    nitroGLYCERIN (NITROSTAT) 0.4 MG SL tablet Place 1 tablet (0.4 mg total) under the tongue every 5 (five) minutes as needed for Chest pain. 25 tablet 3    rizatriptan (MAXALT) 10 MG tablet Take 10 mg by mouth as needed for Migraine.         Past Surgical History:   Procedure Laterality Date    AORTIC VALVE REPLACEMENT      back fusion      CORONARY ARTERY BYPASS GRAFT  02/08/2012    HERNIA REPAIR      umbilical hernia repair  with possible mesh    PATELLA FRACTURE SURGERY      TONSILLECTOMY         Social History     Social History    Marital status:      Spouse name: N/A    Number of children: N/A    Years of education: N/A     Occupational History    Not on file.     Social History Main Topics    Smoking status: Never Smoker    Smokeless tobacco: Not on file    Alcohol use No    Drug use: Unknown    Sexual activity: Not on file     Other Topics Concern    Not on file     Social History Narrative    No narrative on file         Vital Signs Range (Last 24H):         CBC: No results for input(s): WBC, RBC, HGB, HCT, PLT, MCV, MCH, MCHC in the last 72 hours.    CMP: No results for input(s): NA, K, CL, CO2, BUN, CREATININE, GLU, MG, PHOS, CALCIUM, ALBUMIN, PROT, ALKPHOS, ALT, AST, BILITOT in the last 72 hours.    INR  No results for input(s): INR, PROTIME, APTT in the last 72 hours.    Invalid input(s): PT        Diagnostic Studies  Exercise stress echo 6/14/17    TEST DESCRIPTION   The patient exercised for 7.88 minutes on a High Ramp protocol, corresponding to a functional capacity of 13 estimated METS, achieving a peak heart rate of 136 bpm, which is 89% of the age predicted maximum heart rate. The patient discontinued exercise   secondary to fatigue.     At peak exercise, EKG revealed < 1mm of upsloping ST segment depression in the inferolateral leads.     EKG Conclusions:    1. The EKG portion of this study is negative for ischemia at a moderate workload, and peak heart rate of 136 bpm (89% of predicted).   2. Exercise capacity is average.   3. Blood pressure response to exercise was normal (Presenting BP: 120/81 Peak BP: 141/68).   4. No significant arrhythmias were present.   5. There were no symptoms of chest discomfort or significant dyspnea throughout the protocol.   6. The Duke treadmill score was 6 suggesting a low probability for future cardiovascular events.  .    Post Exercise Imaging:    Immediate post  exercise images demonstrate left ventricular function augmenting with the ejection fraction becoming 70%. Right ventricular function augments. Left ventricular end systolic volume decreases.     The apical septum augments. No exercise induced wall motion abnormalities were identified.       CONCLUSIONS     1 - Normal left ventricular systolic function (EF 60-65%).     2 - Wall motion abnormalities.     3 - Normal left ventricular diastolic function.     4 - Right atrial enlargement.     5 - Normal right ventricular systolic function .     6 - Aortic valve prosthesis, effective prosthetic valve area corrected for BSA is 0.86 cm2.     7 - Mild to moderate mitral regurgitation.     8 - Trivial to mild tricuspid regurgitation.     9 - Trivial to mild pulmonic regurgitation.     10 - The estimated PA systolic pressure is 20 mmHg.     No evidence of stress induced myocardial ischemia.     Pre-op Assessment    I have reviewed the Patient Summary Reports.     I have reviewed the Nursing Notes.   I have reviewed the Medications.     Review of Systems  Anesthesia Hx:  History of prior surgery of interest to airway management or planning: heart surgery. Previous anesthesia: General Denies Family Hx of Anesthesia complications.   Denies Personal Hx of Anesthesia complications.   Social:  Non-Smoker    Cardiovascular:   Hypertension, well controlled Valvular problems/murmurs: s/p AVR. Denies MI. CAD  asymptomatic CABG/stent  Denies Dysrhythmias.   Denies Angina.    Pulmonary:   Denies Pneumonia Denies COPD.  Denies Asthma.  Denies Shortness of breath.    Renal/:   Denies Chronic Renal Disease.     Hepatic/GI:   Hiatal Hernia, Denies Liver Disease.    Neurological:   Denies CVA. Headaches Denies Seizures.        Physical Exam  General:  Well nourished    Airway/Jaw/Neck:  Airway Findings: Mouth Opening: Normal Tongue: Normal  Mallampati: I        Eyes/Ears/Nose:  EYES/EARS/NOSE FINDINGS: Normal   Dental:  DENTAL FINDINGS:  Normal   Chest/Lungs:  Chest/Lungs Findings: Clear to auscultation, Normal Respiratory Rate     Heart/Vascular:  Heart Findings: Rate: Normal  Rhythm: Regular Rhythm  Sounds: Normal  Heart Murmur     Abdomen:  Abdomen Findings: Normal    Musculoskeletal:  Musculoskeletal Findings: Normal   Skin:  Skin Findings: Normal    Mental Status:  Mental Status Findings: Normal        Anesthesia Plan  Type of Anesthesia, risks & benefits discussed:  Anesthesia Type:  general  Patient's Preference:   Intra-op Monitoring Plan: standard ASA monitors  Intra-op Monitoring Plan Comments:   Post Op Pain Control Plan: multimodal analgesia, per primary service following discharge from PACU and IV/PO Opioids PRN  Post Op Pain Control Plan Comments:   Induction:   IV  Beta Blocker:  Patient is on a Beta-Blocker and has received one dose within the past 24 hours (No further documentation required).       Informed Consent: Patient understands risks and agrees with Anesthesia plan.  Questions answered.   ASA Score:      Day of Surgery Review of History & Physical:

## 2017-06-27 NOTE — H&P
Motility study:  Read as poor study with outlet obstruction.  Likely this is due to the hiatal hernia.

## 2017-06-28 ENCOUNTER — HOSPITAL ENCOUNTER (INPATIENT)
Facility: HOSPITAL | Age: 67
LOS: 1 days | Discharge: HOME | DRG: 328 | End: 2017-06-29
Attending: SURGERY | Admitting: SURGERY
Payer: MEDICARE

## 2017-06-28 ENCOUNTER — SURGERY (OUTPATIENT)
Age: 67
End: 2017-06-28

## 2017-06-28 ENCOUNTER — ANESTHESIA (OUTPATIENT)
Dept: SURGERY | Facility: HOSPITAL | Age: 67
DRG: 328 | End: 2017-06-28
Payer: MEDICARE

## 2017-06-28 DIAGNOSIS — Z95.1 S/P CABG X 1: ICD-10-CM

## 2017-06-28 DIAGNOSIS — K44.9 HERNIA, HIATAL: Primary | ICD-10-CM

## 2017-06-28 DIAGNOSIS — K44.9 HIATAL HERNIA: ICD-10-CM

## 2017-06-28 DIAGNOSIS — I25.10 CORONARY ARTERY DISEASE INVOLVING NATIVE CORONARY ARTERY OF NATIVE HEART WITHOUT ANGINA PECTORIS: ICD-10-CM

## 2017-06-28 DIAGNOSIS — I95.9 HYPOTENSION: ICD-10-CM

## 2017-06-28 DIAGNOSIS — I10 HTN (HYPERTENSION), BENIGN: ICD-10-CM

## 2017-06-28 PROBLEM — I48.92 ATRIAL FIBRILLATION AND FLUTTER: Status: ACTIVE | Noted: 2017-06-28

## 2017-06-28 PROBLEM — I48.91 ATRIAL FIBRILLATION AND FLUTTER: Status: ACTIVE | Noted: 2017-06-28

## 2017-06-28 PROBLEM — I48.91 ATRIAL FIBRILLATION AND FLUTTER: Status: RESOLVED | Noted: 2017-06-28 | Resolved: 2017-06-28

## 2017-06-28 PROBLEM — I48.92 ATRIAL FIBRILLATION AND FLUTTER: Status: RESOLVED | Noted: 2017-06-28 | Resolved: 2017-06-28

## 2017-06-28 PROCEDURE — 63600175 PHARM REV CODE 636 W HCPCS: Performed by: ANESTHESIOLOGY

## 2017-06-28 PROCEDURE — 11000001 HC ACUTE MED/SURG PRIVATE ROOM

## 2017-06-28 PROCEDURE — 36000710: Performed by: SURGERY

## 2017-06-28 PROCEDURE — 93010 ELECTROCARDIOGRAM REPORT: CPT | Mod: ,,, | Performed by: INTERNAL MEDICINE

## 2017-06-28 PROCEDURE — 0DV44ZZ RESTRICTION OF ESOPHAGOGASTRIC JUNCTION, PERCUTANEOUS ENDOSCOPIC APPROACH: ICD-10-PCS | Performed by: SURGERY

## 2017-06-28 PROCEDURE — 27201423 OPTIME MED/SURG SUP & DEVICES STERILE SUPPLY: Performed by: SURGERY

## 2017-06-28 PROCEDURE — 25000003 PHARM REV CODE 250: Performed by: SURGERY

## 2017-06-28 PROCEDURE — 0BUR4JZ SUPPLEMENT R DIAPHRAGM WITH SYNTH SUB, PERC ENDO APPROACH: ICD-10-PCS | Performed by: SURGERY

## 2017-06-28 PROCEDURE — C1768 GRAFT, VASCULAR: HCPCS | Performed by: SURGERY

## 2017-06-28 PROCEDURE — 37000009 HC ANESTHESIA EA ADD 15 MINS: Performed by: SURGERY

## 2017-06-28 PROCEDURE — 99900035 HC TECH TIME PER 15 MIN (STAT)

## 2017-06-28 PROCEDURE — 88302 TISSUE EXAM BY PATHOLOGIST: CPT | Performed by: PATHOLOGY

## 2017-06-28 PROCEDURE — 94799 UNLISTED PULMONARY SVC/PX: CPT

## 2017-06-28 PROCEDURE — 37000008 HC ANESTHESIA 1ST 15 MINUTES: Performed by: SURGERY

## 2017-06-28 PROCEDURE — 94761 N-INVAS EAR/PLS OXIMETRY MLT: CPT

## 2017-06-28 PROCEDURE — 88302 TISSUE EXAM BY PATHOLOGIST: CPT | Mod: 26,,, | Performed by: PATHOLOGY

## 2017-06-28 PROCEDURE — 71000039 HC RECOVERY, EACH ADD'L HOUR: Performed by: SURGERY

## 2017-06-28 PROCEDURE — 0BUS4JZ SUPPLEMENT LEFT DIAPHRAGM WITH SYNTH SUB, PERC ENDO APPROACH: ICD-10-PCS | Performed by: SURGERY

## 2017-06-28 PROCEDURE — D9220A PRA ANESTHESIA: Mod: ,,, | Performed by: ANESTHESIOLOGY

## 2017-06-28 PROCEDURE — 71000033 HC RECOVERY, INTIAL HOUR: Performed by: SURGERY

## 2017-06-28 PROCEDURE — 25000003 PHARM REV CODE 250: Performed by: ANESTHESIOLOGY

## 2017-06-28 PROCEDURE — 43282 LAP PARAESOPH HER RPR W/MESH: CPT | Mod: ,,, | Performed by: SURGERY

## 2017-06-28 PROCEDURE — 36000711: Performed by: SURGERY

## 2017-06-28 PROCEDURE — 63600175 PHARM REV CODE 636 W HCPCS: Performed by: SURGERY

## 2017-06-28 PROCEDURE — 93010 ELECTROCARDIOGRAM REPORT: CPT | Mod: 76,,, | Performed by: INTERNAL MEDICINE

## 2017-06-28 PROCEDURE — C1781 MESH (IMPLANTABLE): HCPCS | Performed by: SURGERY

## 2017-06-28 PROCEDURE — 93005 ELECTROCARDIOGRAM TRACING: CPT

## 2017-06-28 PROCEDURE — 25000003 PHARM REV CODE 250: Performed by: STUDENT IN AN ORGANIZED HEALTH CARE EDUCATION/TRAINING PROGRAM

## 2017-06-28 DEVICE — REINFORCEMENT BIO TISSUE: Type: IMPLANTABLE DEVICE | Site: ESOPHAGUS | Status: FUNCTIONAL

## 2017-06-28 DEVICE — FELT THIN 1INX1IN: Type: IMPLANTABLE DEVICE | Site: ESOPHAGUS | Status: FUNCTIONAL

## 2017-06-28 RX ORDER — FAMOTIDINE 10 MG/ML
20 INJECTION INTRAVENOUS 2 TIMES DAILY
Status: DISCONTINUED | OUTPATIENT
Start: 2017-06-28 | End: 2017-06-29 | Stop reason: HOSPADM

## 2017-06-28 RX ORDER — NALOXONE HCL 0.4 MG/ML
0.02 VIAL (ML) INJECTION
Status: DISCONTINUED | OUTPATIENT
Start: 2017-06-28 | End: 2017-06-29

## 2017-06-28 RX ORDER — PROPOFOL 10 MG/ML
VIAL (ML) INTRAVENOUS
Status: DISCONTINUED | OUTPATIENT
Start: 2017-06-28 | End: 2017-06-28

## 2017-06-28 RX ORDER — NEOSTIGMINE METHYLSULFATE 1 MG/ML
INJECTION, SOLUTION INTRAVENOUS
Status: DISCONTINUED | OUTPATIENT
Start: 2017-06-28 | End: 2017-06-28

## 2017-06-28 RX ORDER — HYDROMORPHONE HYDROCHLORIDE 1 MG/ML
0.2 INJECTION, SOLUTION INTRAMUSCULAR; INTRAVENOUS; SUBCUTANEOUS EVERY 5 MIN PRN
Status: DISCONTINUED | OUTPATIENT
Start: 2017-06-28 | End: 2017-06-28 | Stop reason: HOSPADM

## 2017-06-28 RX ORDER — KETAMINE HYDROCHLORIDE 100 MG/ML
INJECTION, SOLUTION INTRAMUSCULAR; INTRAVENOUS
Status: DISCONTINUED | OUTPATIENT
Start: 2017-06-28 | End: 2017-06-28

## 2017-06-28 RX ORDER — ACETAMINOPHEN 10 MG/ML
INJECTION, SOLUTION INTRAVENOUS
Status: DISCONTINUED | OUTPATIENT
Start: 2017-06-28 | End: 2017-06-28

## 2017-06-28 RX ORDER — PANTOPRAZOLE SODIUM 40 MG/10ML
40 INJECTION, POWDER, LYOPHILIZED, FOR SOLUTION INTRAVENOUS 2 TIMES DAILY
Status: DISCONTINUED | OUTPATIENT
Start: 2017-06-28 | End: 2017-06-28 | Stop reason: RX

## 2017-06-28 RX ORDER — FENTANYL CITRATE 50 UG/ML
INJECTION, SOLUTION INTRAMUSCULAR; INTRAVENOUS
Status: DISCONTINUED | OUTPATIENT
Start: 2017-06-28 | End: 2017-06-28

## 2017-06-28 RX ORDER — LIDOCAINE HYDROCHLORIDE 10 MG/ML
1 INJECTION, SOLUTION EPIDURAL; INFILTRATION; INTRACAUDAL; PERINEURAL ONCE
Status: DISCONTINUED | OUTPATIENT
Start: 2017-06-28 | End: 2017-06-28

## 2017-06-28 RX ORDER — ROCURONIUM BROMIDE 10 MG/ML
INJECTION, SOLUTION INTRAVENOUS
Status: DISCONTINUED | OUTPATIENT
Start: 2017-06-28 | End: 2017-06-28

## 2017-06-28 RX ORDER — SODIUM CHLORIDE 9 MG/ML
INJECTION, SOLUTION INTRAVENOUS CONTINUOUS
Status: DISCONTINUED | OUTPATIENT
Start: 2017-06-28 | End: 2017-06-28

## 2017-06-28 RX ORDER — HYDROMORPHONE HCL IN 0.9% NACL 6 MG/30 ML
PATIENT CONTROLLED ANALGESIA SYRINGE INTRAVENOUS CONTINUOUS
Status: DISCONTINUED | OUTPATIENT
Start: 2017-06-28 | End: 2017-06-29

## 2017-06-28 RX ORDER — SUCCINYLCHOLINE CHLORIDE 20 MG/ML
INJECTION INTRAMUSCULAR; INTRAVENOUS
Status: DISCONTINUED | OUTPATIENT
Start: 2017-06-28 | End: 2017-06-28

## 2017-06-28 RX ORDER — ACETAMINOPHEN 10 MG/ML
1000 INJECTION, SOLUTION INTRAVENOUS EVERY 8 HOURS
Status: COMPLETED | OUTPATIENT
Start: 2017-06-28 | End: 2017-06-29

## 2017-06-28 RX ORDER — ENOXAPARIN SODIUM 100 MG/ML
40 INJECTION SUBCUTANEOUS
Status: DISCONTINUED | OUTPATIENT
Start: 2017-06-28 | End: 2017-06-29 | Stop reason: HOSPADM

## 2017-06-28 RX ORDER — HYDROCODONE BITARTRATE AND ACETAMINOPHEN 7.5; 325 MG/15ML; MG/15ML
15 SOLUTION ORAL EVERY 4 HOURS PRN
Status: DISCONTINUED | OUTPATIENT
Start: 2017-06-29 | End: 2017-06-29 | Stop reason: HOSPADM

## 2017-06-28 RX ORDER — ONDANSETRON 2 MG/ML
4 INJECTION INTRAMUSCULAR; INTRAVENOUS EVERY 6 HOURS PRN
Status: DISCONTINUED | OUTPATIENT
Start: 2017-06-28 | End: 2017-06-29

## 2017-06-28 RX ORDER — LIDOCAINE HCL/PF 100 MG/5ML
SYRINGE (ML) INTRAVENOUS
Status: DISCONTINUED | OUTPATIENT
Start: 2017-06-28 | End: 2017-06-28

## 2017-06-28 RX ORDER — ONDANSETRON 2 MG/ML
4 INJECTION INTRAMUSCULAR; INTRAVENOUS DAILY PRN
Status: DISCONTINUED | OUTPATIENT
Start: 2017-06-28 | End: 2017-06-28 | Stop reason: HOSPADM

## 2017-06-28 RX ORDER — CEFAZOLIN SODIUM 1 G/3ML
INJECTION, POWDER, FOR SOLUTION INTRAMUSCULAR; INTRAVENOUS
Status: DISCONTINUED | OUTPATIENT
Start: 2017-06-28 | End: 2017-06-28

## 2017-06-28 RX ORDER — SODIUM CHLORIDE 0.9 % (FLUSH) 0.9 %
3 SYRINGE (ML) INJECTION EVERY 8 HOURS
Status: DISCONTINUED | OUTPATIENT
Start: 2017-06-28 | End: 2017-06-28

## 2017-06-28 RX ORDER — MIDAZOLAM HYDROCHLORIDE 1 MG/ML
INJECTION, SOLUTION INTRAMUSCULAR; INTRAVENOUS
Status: DISCONTINUED | OUTPATIENT
Start: 2017-06-28 | End: 2017-06-28

## 2017-06-28 RX ORDER — BUPIVACAINE HYDROCHLORIDE 2.5 MG/ML
INJECTION, SOLUTION EPIDURAL; INFILTRATION; INTRACAUDAL
Status: DISCONTINUED | OUTPATIENT
Start: 2017-06-28 | End: 2017-06-28 | Stop reason: HOSPADM

## 2017-06-28 RX ORDER — PHENYLEPHRINE HYDROCHLORIDE 10 MG/ML
INJECTION INTRAVENOUS
Status: DISCONTINUED | OUTPATIENT
Start: 2017-06-28 | End: 2017-06-28

## 2017-06-28 RX ORDER — GLYCOPYRROLATE 0.2 MG/ML
INJECTION INTRAMUSCULAR; INTRAVENOUS
Status: DISCONTINUED | OUTPATIENT
Start: 2017-06-28 | End: 2017-06-28

## 2017-06-28 RX ORDER — DIPHENHYDRAMINE HYDROCHLORIDE 50 MG/ML
25 INJECTION INTRAMUSCULAR; INTRAVENOUS EVERY 6 HOURS PRN
Status: DISCONTINUED | OUTPATIENT
Start: 2017-06-28 | End: 2017-06-28 | Stop reason: HOSPADM

## 2017-06-28 RX ORDER — SODIUM CHLORIDE 9 MG/ML
INJECTION, SOLUTION INTRAVENOUS CONTINUOUS
Status: DISCONTINUED | OUTPATIENT
Start: 2017-06-28 | End: 2017-06-29 | Stop reason: HOSPADM

## 2017-06-28 RX ADMIN — Medication: at 08:06

## 2017-06-28 RX ADMIN — KETAMINE HYDROCHLORIDE 30 MG: 100 INJECTION, SOLUTION, CONCENTRATE INTRAMUSCULAR; INTRAVENOUS at 08:06

## 2017-06-28 RX ADMIN — SUCCINYLCHOLINE CHLORIDE 120 MG: 20 INJECTION, SOLUTION INTRAMUSCULAR; INTRAVENOUS at 08:06

## 2017-06-28 RX ADMIN — ROCURONIUM BROMIDE 35 MG: 10 INJECTION, SOLUTION INTRAVENOUS at 08:06

## 2017-06-28 RX ADMIN — BUPIVACAINE HYDROCHLORIDE 30 ML: 2.5 INJECTION, SOLUTION EPIDURAL; INFILTRATION; INTRACAUDAL; PERINEURAL at 08:06

## 2017-06-28 RX ADMIN — EPHEDRINE SULFATE 10 MG: 50 INJECTION, SOLUTION INTRAMUSCULAR; INTRAVENOUS; SUBCUTANEOUS at 08:06

## 2017-06-28 RX ADMIN — EPHEDRINE SULFATE 10 MG: 50 INJECTION, SOLUTION INTRAMUSCULAR; INTRAVENOUS; SUBCUTANEOUS at 09:06

## 2017-06-28 RX ADMIN — ROCURONIUM BROMIDE 20 MG: 10 INJECTION, SOLUTION INTRAVENOUS at 09:06

## 2017-06-28 RX ADMIN — CEFAZOLIN 2 G: 1 INJECTION, POWDER, FOR SOLUTION INTRAVENOUS at 08:06

## 2017-06-28 RX ADMIN — GLYCOPYRROLATE 0.6 MG: 0.2 INJECTION, SOLUTION INTRAMUSCULAR; INTRAVENOUS at 09:06

## 2017-06-28 RX ADMIN — PHENYLEPHRINE HYDROCHLORIDE 100 MCG: 10 INJECTION INTRAVENOUS at 08:06

## 2017-06-28 RX ADMIN — ENOXAPARIN SODIUM 40 MG: 100 INJECTION SUBCUTANEOUS at 06:06

## 2017-06-28 RX ADMIN — SODIUM CHLORIDE: 0.9 INJECTION, SOLUTION INTRAVENOUS at 10:06

## 2017-06-28 RX ADMIN — EPHEDRINE SULFATE 5 MG: 50 INJECTION, SOLUTION INTRAMUSCULAR; INTRAVENOUS; SUBCUTANEOUS at 08:06

## 2017-06-28 RX ADMIN — NEOSTIGMINE METHYLSULFATE 5 MG: 1 INJECTION INTRAVENOUS at 09:06

## 2017-06-28 RX ADMIN — ACETAMINOPHEN 1000 MG: 10 INJECTION, SOLUTION INTRAVENOUS at 04:06

## 2017-06-28 RX ADMIN — SODIUM CHLORIDE: 0.9 INJECTION, SOLUTION INTRAVENOUS at 08:06

## 2017-06-28 RX ADMIN — ACETAMINOPHEN 1000 MG: 10 INJECTION, SOLUTION INTRAVENOUS at 08:06

## 2017-06-28 RX ADMIN — PROPOFOL 150 MG: 10 INJECTION, EMULSION INTRAVENOUS at 08:06

## 2017-06-28 RX ADMIN — FENTANYL CITRATE 100 MCG: 50 INJECTION, SOLUTION INTRAMUSCULAR; INTRAVENOUS at 08:06

## 2017-06-28 RX ADMIN — SODIUM CHLORIDE, SODIUM GLUCONATE, SODIUM ACETATE, POTASSIUM CHLORIDE, MAGNESIUM CHLORIDE, SODIUM PHOSPHATE, DIBASIC, AND POTASSIUM PHOSPHATE: .53; .5; .37; .037; .03; .012; .00082 INJECTION, SOLUTION INTRAVENOUS at 09:06

## 2017-06-28 RX ADMIN — FAMOTIDINE 20 MG: 10 INJECTION INTRAVENOUS at 08:06

## 2017-06-28 RX ADMIN — LIDOCAINE HYDROCHLORIDE 80 MG: 20 INJECTION, SOLUTION INTRAVENOUS at 08:06

## 2017-06-28 RX ADMIN — ROCURONIUM BROMIDE 10 MG: 10 INJECTION, SOLUTION INTRAVENOUS at 08:06

## 2017-06-28 RX ADMIN — FAMOTIDINE 20 MG: 10 INJECTION INTRAVENOUS at 11:06

## 2017-06-28 RX ADMIN — MIDAZOLAM HYDROCHLORIDE 2 MG: 1 INJECTION, SOLUTION INTRAMUSCULAR; INTRAVENOUS at 07:06

## 2017-06-28 NOTE — CONSULTS
Cardiology Consult Note  Attending Physician: Bill Jara MD  Reason for Consult: hypotension during surgery    HPI:   66 y.o. gentleman with history of HTN, CAD s/p CABG (2012), aortic stenosis s/p prosthetic AVR (2012) who became hypotensive with concern for RBBB during a hiatal hernia repair surgery today. Per anesthesia intra-op note, at 9:04AM, pt was acutely hypotensive with SBP in 90's and EKG with repolarization abnormalities (non-sustained ventricular arrhythmia) so volatile anesthetic was stopped, 15 ephedrine given and MAP >70 after. Around 9:30AM, conduction abnormality concerning for RBB seen with another hypotensive episode; additional 10 of ephedrine given.     Upon interview, pt was walking up and down the halls just prior, denied any chest pain/SOB/palpitations/lightheadedness or dizziness. Pt recently seen in cardiology clinicc and BP medication switched from metoprolol to coreg 12.5mg BID in April. Recent exercise stress echo done 6/14/17 negative for ischemia and Duke treadmill score of 6.     ROS:    Constitution: negative for - fever, chills, weight loss or weight gain  HENT: negative for - sore throat, rhinorrhea, or headache  Eyes: negative for - blurred or double vision  Cardiovascular: no chest pain or dyspnea on exertion  Pulmonary: no cough, shortness of breath, or wheezing  Gastrointestinal: negative for - abdominal pain, nausea, vomiting, or diarrhea  : negative for - dysuria  Neurological: negative for - focal weakness or sensory changes  PMH:     Past Medical History:   Diagnosis Date    Coronary artery disease     Hyperlipidemia     Hypertension     Migraines      Past Surgical History:   Procedure Laterality Date    AORTIC VALVE REPLACEMENT      back fusion      CORONARY ARTERY BYPASS GRAFT  02/08/2012    HERNIA REPAIR      umbilical hernia repair with possible mesh    PATELLA FRACTURE SURGERY      TONSILLECTOMY       Allergies:   Review of patient's  allergies indicates:  No Known Allergies  Medications:     No current facility-administered medications on file prior to encounter.      Current Outpatient Prescriptions on File Prior to Encounter   Medication Sig Dispense Refill    aspirin (ECOTRIN) 325 MG EC tablet Take 325 mg by mouth once daily.      atorvastatin (LIPITOR) 40 MG tablet Take 40 mg by mouth once daily. One a day or as directed      carvedilol (COREG) 12.5 MG tablet Take 1 tablet (12.5 mg total) by mouth 2 (two) times daily. .5-1 twice daily or as directed 180 tablet 3    gabapentin (NEURONTIN) 300 MG capsule TAKE 2 CAPSULES BY MOUTH 3 TIMES A DAY  2    testosterone cypionate 200 mg/mL Kit Inject into the muscle.      amoxicillin (AMOXIL) 500 MG capsule TAKE ONE CAPSULE BY MOUTH EVERY 12 HOURS AND 4CAPS 1 HOUR BEFORE DENTAL APPT  1    nitroGLYCERIN (NITROSTAT) 0.4 MG SL tablet Place 1 tablet (0.4 mg total) under the tongue every 5 (five) minutes as needed for Chest pain. 25 tablet 3    rizatriptan (MAXALT) 10 MG tablet Take 10 mg by mouth as needed for Migraine.         Inpatient Medications   Continuous Infusions:   sodium chloride 0.9% 125 mL/hr at 06/28/17 1023     Scheduled Meds:   acetaminophen  1,000 mg Intravenous Q8H    enoxparin  40 mg Subcutaneous Q24H    famotidine (PF)  20 mg Intravenous BID     PRN Meds:[START ON 6/29/2017] hydrocodone-apap 2.5-108 MG/5 ML, ondansetron, promethazine (PHENERGAN) IVPB     Social History:     Social History   Substance Use Topics    Smoking status: Never Smoker    Smokeless tobacco: Never Used    Alcohol use No     Family History:     Family History   Problem Relation Age of Onset    Hypertension Mother     Hypertension Father     Heart disease Brother     Heart attack Neg Hx      Physical Exam:     Vitals:  Temp:  [96.3 °F (35.7 °C)-97.9 °F (36.6 °C)]   Pulse:  [51-70]   Resp:  [11-20]   BP: (113-160)/(65-91)   SpO2:  [97 %-100 %]  on RA I/O's:    Intake/Output Summary (Last 24 hours)  at 06/28/17 1433  Last data filed at 06/28/17 1200   Gross per 24 hour   Intake             1519 ml   Output                0 ml   Net             1519 ml        Constitutional: NAD, conversant  HEENT: Sclera anicteric, PERRLA, EOMI  Neck: No JVD  CV: RRR, crescendo decrescendo murmur at RUSB radiating to the carotids, normal S1/S2  Pulm: CTAB, no wheezes, rales, or ronchi  GI: Abdomen soft, NTND, +BS  Extremities: No LE edema, warm and well perfused  Skin: No ecchymosis, erythema, or ulcers  Psych: AOx3, appropriate affect  Neuro: No focal deficits    Labs:     No results for input(s): NA, K, CL, CO2, BUN, CREATININE, GLU, ANIONGAP in the last 168 hours.  No results for input(s): TROPONINI, BNP in the last 168 hours. No results for input(s): WBC, HGB, HCT, PLT in the last 168 hours.  No results for input(s): AST, ALT, ALKPHOS, BILITOT, ALBUMIN in the last 168 hours.     Imaging:     EF   Date Value Ref Range Status   06/14/2017 63 55 - 65      1 - Normal left ventricular systolic function (EF 60-65%).     2 - Wall motion abnormalities.     3 - Normal left ventricular diastolic function.     4 - Right atrial enlargement.     5 - Normal right ventricular systolic function .     6 - Aortic valve prosthesis, effective prosthetic valve area corrected for BSA is 0.86 cm2.     7 - Mild to moderate mitral regurgitation.     8 - Trivial to mild tricuspid regurgitation.     9 - Trivial to mild pulmonic regurgitation.     10 - The estimated PA systolic pressure is 20 mmHg.    EKG: NSR, no evidence of RBBB    Telemetry: NSR    Assessment:   66 year old male with history of HTN, CAD s/p CABG (2012), aortic stenosis s/p prosthetic AVR (2012) who became hypotensive with concern for RBBB during a hiatal hernia repair surgery today. Upon review, EKG with no evidence of bundle branch block and no evidence of ischemia. Pt likely hypotensive 2/2 positive pressure ventilation during surgery vs anesthesia med induced vs vagal nerve  stimulation during hiatal hernia repair.     Plan:   -do not feel patient has any evidence of bundle branch block or ischemia per   -the above conveyed to primary team, okay to discharge from cardiology standpoint. Pt does not need follow up with cardiology as outpatient.     Discussed with attending Dr. Aguero, staff attestation to follow.      Signed:  Ramesh Mcdonald MD PGY-1

## 2017-06-28 NOTE — PROGRESS NOTES
Per primary team: no telemetry needed for transfer, awaiting further recommendations from cardiology.

## 2017-06-28 NOTE — ANESTHESIA RELEASE NOTE
Anesthesia Release from PACU Note    Patient: Bryant King    Procedure(s) Performed: Procedure(s) (LRB):  REPAIR-HERNIA-HIATAL-LAPAROSCOPIC w/ poss. mesh (N/A)  [id number] (N/A)    Anesthesia type: General    Post pain: Adequate analgesia    Post assessment: no apparent anesthetic complications    Last Vitals:   Vitals:    06/28/17 1045   BP: 130/82   Pulse: (!) 55   Resp: 11   Temp:    SpO2: 97%       Post vital signs: stable    Level of consciousness: awake    Complications: none    Airway Patency: patent    Respiratory: spontaneous    Cardiovascular: stable    Hydration: euvolemic

## 2017-06-28 NOTE — ANESTHESIA POSTPROCEDURE EVALUATION
"Anesthesia Post Evaluation    Patient: Bryant King    Procedure(s) Performed: Procedure(s) (LRB):  REPAIR-HERNIA-HIATAL-LAPAROSCOPIC w/ poss. mesh (N/A)  [id number] (N/A)    Final Anesthesia Type: general  Patient location during evaluation: PACU  Patient participation: Yes- Able to Participate  Level of consciousness: awake and alert  Post-procedure vital signs: reviewed and stable  Pain management: adequate  Airway patency: patent  PONV status at discharge: No PONV  Anesthetic complications: no      Cardiovascular status: blood pressure returned to baseline  Respiratory status: unassisted  Hydration status: euvolemic  Follow-up not needed.        Visit Vitals  /82   Pulse (!) 55   Temp 36.4 °C (97.6 °F) (Oral)   Resp 11   Ht 5' 8" (1.727 m)   Wt 87.5 kg (193 lb)   SpO2 97%   BMI 29.35 kg/m²       Pain/Luciana Score: Pain Assessment Performed: Yes (6/28/2017 10:30 AM)  Presence of Pain: denies (6/28/2017 10:30 AM)  Luciana Score: 10 (6/28/2017 10:30 AM)      "

## 2017-06-28 NOTE — BRIEF OP NOTE
Operative Note       Surgery Date: 6/28/2017     Surgeon(s) and Role:     * Marcial Mcdermott Jr., MD - Resident - Assisting     * Bill Jara MD - Primary    Pre-op Diagnosis:  Hernia, hiatal [K44.9]    Post-op Diagnosis:  Hernia, hiatal [K44.9]    Procedure(s) (LRB):  REPAIR-HERNIA-HIATAL-LAPAROSCOPIC w/ poss. mesh (N/A)  [id number]-[id number]-[id number] (N/A)    Anesthesia: General    Procedure in Detail/Findings:  Large hernia, repair with mesh (1/3 stomach in chest).  Toupet/60 [place] bougie.    Estimated Blood Loss: Minimal           Specimens     Start     Ordered    06/28/17 0951  Specimen to Pathology - Surgery  Once      06/28/17 0950        Implants:   Implant Name Type Inv. Item Serial No.  Lot No. LRB No. Used   FELT THIN [id number] - [id number]  FELT THIN [id number]  C.R. BARD [id number] N/A 2   REINFORCEMENT BIO TISSUE - [id number]   REINFORCEMENT BIO TISSUE   W.L. GORE   N/A 1              Disposition: PACU - hemodynamically stable.           Condition: Good    Attestation:  I was present and scrubbed for the entire procedure.

## 2017-06-28 NOTE — OP NOTE
Surgery Date: 6/28/2017     Surgeon(s) and Role:     * Marcial Mcdermott Jr., MD - Resident - Assisting     * Bill Jara MD - Primary    Pre-op Diagnosis:  Hernia, hiatal [K44.9]    Post-op Diagnosis:  Hernia, hiatal [K44.9]    Procedure(s) (LRB):  REPAIR-HERNIA-HIATAL-LAPAROSCOPIC w/ poss. mesh (N/A)  SIOHSRYSOHCDWL-BSKOJX-JNTIKZSOUUVM (N/A)    Anesthesia: General      PROCEDURE IN DETAIL: The patient was placed under general anesthesia on the   marquet bed. The legs were abducted. The abdomen was prepped and draped in the  usual manner. Access to peritoneum was gained 15 cm below the xiphoid using a 10mm  Optiview trocar under direct vision. Pneumoperitoneum to 15 mmHg with CO2 gas   was obtained. Four 5-mm trocars were placed medially, subcostally at 11 and 20   cm on the left side and 7 and 15 cm on the right side. A liver retractor was   placed exposing the esophageal hiatus.  The hernia was large. Using judicial use of the Harmonic   scalpel and blunt dissection the hernia sac was divided from the crura and brought into the abdomen.  It was resected (from the left of the anterior vagus to the posterior vagus making sure to preserve these strutures and the specimen was removed at the end of the case).  This took 30 minutes longer than the typical hiatal hernia.  The greater curve was taken down with the harmonic scalpel from a third of the way from the LES to the base of the left naif and taking all posterior attachements.  After dissection 3 to 4 cm of esophagus laying within the abdominal cavity without   tension. The crural opening was large and was reapproximated with 4  0 neurolon plegeted sutures.  A bio a mesh was sutured over the closure.  The fundus of the stomach was brought around the esophagus posteriorly and we checked for the angle of His and performed the shoe-shine maneuver.  A 36 dilator passed easily and the fundoplication was performed around a 60 dilator. A Toupet fundoplication  was formed around the dilator using 3X2-0 Neurolon sutures on each side making a 270 degree 2 cm wrap.  Care was used not to suture around the vagus nerve.  .  The vagus nerves were preserved with the esophagus.  A grasper could just be placed between the fundoplication and esophagus.  The dilator was removed and the abdomen was inspected for hemostasis. The liver retractor was removed. The trocars were removed under direct vision. Prior to removing the last trocar, pneumoperitoneum was allowed to escape. The skin incisions were closed with 4-0 plain catgut and reinforced with Mastisol, Steri-Strips, and Band-Aids. The patient tolerated the procedurewell and was brought to Recovery Room in stable condition. Sponge and needle counts were correct at the end of the case.    Pathology: hernia sac Complication: none  Blood loss: minimal

## 2017-06-28 NOTE — PLAN OF CARE
Problem: Patient Care Overview  Goal: Plan of Care Review  Outcome: Ongoing (interventions implemented as appropriate)  Pt alert and oriented to name, , place and situation. Pt bed in lowest position with call light within reach. Safety precautions maintained. No falls observed or reported, at this time. Pt pain assessed and managed. SCDs in place and pharmacological DVT prophylaxis initiated. Pt is up at abbie and reports voiding without difficulty. Pt ambulates frequently. Will continue to monitor

## 2017-06-28 NOTE — NURSING TRANSFER
Nursing Transfer Note      6/28/2017     Transfer To: 523 [address]     Transfer via bed    Transported by PCT, RN    Medicines sent: IVF    Chart send with patient: Yes    Notified: spouse    Patient reassessed at: 6/28/17, 1137

## 2017-06-28 NOTE — TRANSFER OF CARE
"Anesthesia Transfer of Care Note    Patient: Bryant King    Procedure(s) Performed: Procedure(s) (LRB):  REPAIR-HERNIA-HIATAL-LAPAROSCOPIC w/ poss. mesh (N/A)  DYGUBSICKRIQAV-DUNYMY-[id number] (N/A)    Patient location: PACU    Anesthesia Type: general    Transport from OR: Transported from OR on room air with adequate spontaneous ventilation    Post pain: adequate analgesia    Post assessment: no apparent anesthetic complications    Post vital signs: stable    Level of consciousness: awake, oriented and alert    Nausea/Vomiting: no nausea/vomiting    Complications: Pt had conduction abnormalities intraop with few episodes of severe hypotension, recoverable to hemodynamic stability within minutes. PACU RN given report. cardiology consult placed. 12 lead EKG and CXR ordered. PT VSS upon arrival to PACU.     Transfer of care protocol was followed      Last vitals:   Visit Vitals  /85   Pulse 70   Temp 36.4 °C (97.6 °F) (Oral)   Resp 18   Ht 5' 8" (1.727 m)   Wt 87.5 kg (193 lb)   SpO2 97%   BMI 29.35 kg/m²     "

## 2017-06-28 NOTE — PLAN OF CARE
VSS. Patient states pain is tolerable. No N&V. Teds/SCD's in place throughout duration in PACU. Ready to be transferred to the next phase of Care.

## 2017-06-28 NOTE — NURSING
Pt admitted to floor, stable, VSS, no complaints at this time noted or stated, I.S at bedside, SCDs intact, will hand over to nurse. BITE pain menu, TV guide, pain control pamphlet, given, explained, and offered to patient. Selene Fairchild RN

## 2017-06-29 VITALS
TEMPERATURE: 98 F | OXYGEN SATURATION: 95 % | SYSTOLIC BLOOD PRESSURE: 138 MMHG | RESPIRATION RATE: 18 BRPM | DIASTOLIC BLOOD PRESSURE: 86 MMHG | HEART RATE: 60 BPM | HEIGHT: 68 IN | BODY MASS INDEX: 29.25 KG/M2 | WEIGHT: 193 LBS

## 2017-06-29 LAB
ANION GAP SERPL CALC-SCNC: 8 MMOL/L
BASOPHILS # BLD AUTO: 0.04 K/UL
BASOPHILS NFR BLD: 0.4 %
BUN SERPL-MCNC: 8 MG/DL
CALCIUM SERPL-MCNC: 8.4 MG/DL
CHLORIDE SERPL-SCNC: 111 MMOL/L
CO2 SERPL-SCNC: 27 MMOL/L
CREAT SERPL-MCNC: 1.2 MG/DL
DIFFERENTIAL METHOD: ABNORMAL
EOSINOPHIL # BLD AUTO: 0.2 K/UL
EOSINOPHIL NFR BLD: 1.8 %
ERYTHROCYTE [DISTWIDTH] IN BLOOD BY AUTOMATED COUNT: 13.1 %
EST. GFR  (AFRICAN AMERICAN): >60 ML/MIN/1.73 M^2
EST. GFR  (NON AFRICAN AMERICAN): >60 ML/MIN/1.73 M^2
GLUCOSE SERPL-MCNC: 124 MG/DL
HCT VFR BLD AUTO: 45 %
HGB BLD-MCNC: 15.1 G/DL
LYMPHOCYTES # BLD AUTO: 2.4 K/UL
LYMPHOCYTES NFR BLD: 24.8 %
MAGNESIUM SERPL-MCNC: 2.4 MG/DL
MCH RBC QN AUTO: 31.9 PG
MCHC RBC AUTO-ENTMCNC: 33.6 %
MCV RBC AUTO: 95 FL
MONOCYTES # BLD AUTO: 1.2 K/UL
MONOCYTES NFR BLD: 12.5 %
NEUTROPHILS # BLD AUTO: 5.7 K/UL
NEUTROPHILS NFR BLD: 60.2 %
PHOSPHATE SERPL-MCNC: 3.3 MG/DL
PLATELET # BLD AUTO: 173 K/UL
PMV BLD AUTO: 11.1 FL
POTASSIUM SERPL-SCNC: 3.9 MMOL/L
RBC # BLD AUTO: 4.73 M/UL
SODIUM SERPL-SCNC: 146 MMOL/L
WBC # BLD AUTO: 9.52 K/UL

## 2017-06-29 PROCEDURE — 25000003 PHARM REV CODE 250: Performed by: SURGERY

## 2017-06-29 PROCEDURE — 84100 ASSAY OF PHOSPHORUS: CPT

## 2017-06-29 PROCEDURE — 99222 1ST HOSP IP/OBS MODERATE 55: CPT | Mod: ,,, | Performed by: INTERNAL MEDICINE

## 2017-06-29 PROCEDURE — 83735 ASSAY OF MAGNESIUM: CPT

## 2017-06-29 PROCEDURE — 80048 BASIC METABOLIC PNL TOTAL CA: CPT

## 2017-06-29 PROCEDURE — 85025 COMPLETE CBC W/AUTO DIFF WBC: CPT

## 2017-06-29 PROCEDURE — 36415 COLL VENOUS BLD VENIPUNCTURE: CPT

## 2017-06-29 PROCEDURE — 63600175 PHARM REV CODE 636 W HCPCS: Performed by: SURGERY

## 2017-06-29 RX ORDER — HYDROCODONE BITARTRATE AND ACETAMINOPHEN 7.5; 325 MG/15ML; MG/15ML
15 SOLUTION ORAL EVERY 4 HOURS PRN
Qty: 473 ML | Refills: 0 | Status: SHIPPED | OUTPATIENT
Start: 2017-06-29 | End: 2017-06-29 | Stop reason: HOSPADM

## 2017-06-29 RX ORDER — OMEPRAZOLE 20 MG/1
20 CAPSULE, DELAYED RELEASE ORAL DAILY
Qty: 30 CAPSULE | Refills: 11 | Status: SHIPPED | OUTPATIENT
Start: 2017-06-29 | End: 2021-06-09

## 2017-06-29 RX ORDER — GABAPENTIN 300 MG/1
300 CAPSULE ORAL 3 TIMES DAILY
Status: DISCONTINUED | OUTPATIENT
Start: 2017-06-29 | End: 2017-06-29 | Stop reason: HOSPADM

## 2017-06-29 RX ORDER — POLYETHYLENE GLYCOL 3350 17 G/17G
17 POWDER, FOR SOLUTION ORAL DAILY
Qty: 1 BOTTLE | Refills: 0 | Status: SHIPPED | OUTPATIENT
Start: 2017-06-29 | End: 2018-08-15

## 2017-06-29 RX ORDER — ONDANSETRON 8 MG/1
8 TABLET, ORALLY DISINTEGRATING ORAL EVERY 6 HOURS PRN
Status: DISCONTINUED | OUTPATIENT
Start: 2017-06-29 | End: 2017-06-29 | Stop reason: HOSPADM

## 2017-06-29 RX ORDER — ONDANSETRON 8 MG/1
8 TABLET, ORALLY DISINTEGRATING ORAL EVERY 6 HOURS PRN
Qty: 40 TABLET | Refills: 0 | Status: SHIPPED | OUTPATIENT
Start: 2017-06-29 | End: 2018-08-15

## 2017-06-29 RX ORDER — CARVEDILOL 12.5 MG/1
12.5 TABLET ORAL 2 TIMES DAILY
Status: DISCONTINUED | OUTPATIENT
Start: 2017-06-29 | End: 2017-06-29 | Stop reason: HOSPADM

## 2017-06-29 RX ORDER — ATORVASTATIN CALCIUM 20 MG/1
40 TABLET, FILM COATED ORAL DAILY
Status: DISCONTINUED | OUTPATIENT
Start: 2017-06-29 | End: 2017-06-29 | Stop reason: HOSPADM

## 2017-06-29 RX ORDER — PROMETHAZINE HYDROCHLORIDE 25 MG/1
25 SUPPOSITORY RECTAL EVERY 6 HOURS PRN
Qty: 40 SUPPOSITORY | Refills: 0 | Status: SHIPPED | OUTPATIENT
Start: 2017-06-29 | End: 2018-08-15

## 2017-06-29 RX ORDER — OXYCODONE HCL 5 MG/5 ML
5 SOLUTION, ORAL ORAL EVERY 4 HOURS PRN
Qty: 300 ML | Refills: 0 | Status: SHIPPED | OUTPATIENT
Start: 2017-06-29 | End: 2018-08-15

## 2017-06-29 RX ORDER — PROMETHAZINE HYDROCHLORIDE 25 MG/1
25 SUPPOSITORY RECTAL EVERY 6 HOURS PRN
Status: DISCONTINUED | OUTPATIENT
Start: 2017-06-29 | End: 2017-06-29 | Stop reason: HOSPADM

## 2017-06-29 RX ORDER — ASPIRIN 325 MG
325 TABLET ORAL DAILY
Status: DISCONTINUED | OUTPATIENT
Start: 2017-06-29 | End: 2017-06-29 | Stop reason: HOSPADM

## 2017-06-29 RX ADMIN — GABAPENTIN 300 MG: 300 CAPSULE ORAL at 01:06

## 2017-06-29 RX ADMIN — CARVEDILOL 12.5 MG: 12.5 TABLET, FILM COATED ORAL at 12:06

## 2017-06-29 RX ADMIN — ACETAMINOPHEN 1000 MG: 10 INJECTION, SOLUTION INTRAVENOUS at 07:06

## 2017-06-29 RX ADMIN — ASPIRIN 325 MG ORAL TABLET 325 MG: 325 PILL ORAL at 12:06

## 2017-06-29 RX ADMIN — FAMOTIDINE 20 MG: 10 INJECTION INTRAVENOUS at 09:06

## 2017-06-29 RX ADMIN — HYDROCODONE BITARTRATE AND ACETAMINOPHEN 15 ML: 7.5; 325 SOLUTION ORAL at 05:06

## 2017-06-29 RX ADMIN — ONDANSETRON 4 MG: 2 INJECTION INTRAMUSCULAR; INTRAVENOUS at 07:06

## 2017-06-29 RX ADMIN — ACETAMINOPHEN 1000 MG: 10 INJECTION, SOLUTION INTRAVENOUS at 01:06

## 2017-06-29 RX ADMIN — ATORVASTATIN CALCIUM 40 MG: 20 TABLET, FILM COATED ORAL at 12:06

## 2017-06-29 NOTE — DISCHARGE SUMMARY
[facility]  General Surgery  Discharge Summary      Patient Name: Bryant King  MRN: [id number]  Admission Date: 6/28/2017  Hospital Length of Stay: 1 days  Discharge Date and Time:  06/29/2017 2:20 PM  Attending Physician: Bill Jara MD   Discharging Provider: Marcial Pelayo Jr., MD  Primary Care Provider: Primary Doctor No    HPI:   Patient presented on 6/28 for hiatal hernia repair with Toupe fundoplication.    Procedure(s) (LRB):  REPAIR-HERNIA-HIATAL-LAPAROSCOPIC w/ poss. mesh (N/A)  JIKZDVMANCLCLX-ISDVHV-VLVDLNYVGLOR (N/A)      Indwelling Lines/Drains at time of discharge:   Lines/Drains/Airways          No matching active lines, drains, or airways        Hospital Course: Patient was observed overnight.  He had no acute events overnight.  When he started his clear liquids on the morning of post-operative day #1, he developed nausea without vomiting.  He received Zofran, and had no more issues with nausea.  He was able to tolerated a full liquid diet, ambulate independently, void, and have well controlled pain. He was deemed stable for discharged on post-operative day #1.    Consults:   Consults         Status Ordering Provider     Inpatient consult to Cardiology  Once     Provider:  (Not yet assigned)    MARCIAL Benitez JR.          Significant Diagnostic Studies: Labs:   CMP   Recent Labs  Lab 06/29/17  0513   *   K 3.9   *   CO2 27   *   BUN 8   CREATININE 1.2   CALCIUM 8.4*   ANIONGAP 8   ESTGFRAFRICA >60.0   EGFRNONAA >60.0    and CBC   Recent Labs  Lab 06/29/17  0513   WBC 9.52   HGB 15.1   HCT 45.0          Pending Diagnostic Studies:     None        Final Active Diagnoses:    Diagnosis Date Noted POA    PRINCIPAL PROBLEM:  Hiatal hernia [K44.9] 06/28/2017 Yes    HTN (hypertension), benign [I10] 04/25/2017 Yes    Coronary artery disease involving native coronary artery [I25.10] 04/25/2017 Yes      Problems Resolved During this  Admission:    Diagnosis Date Noted Date Resolved POA      Discharged Condition: good    Disposition: Home or Self Care    Follow Up:  Follow-up Information     Bill Jara MD In 2 weeks.    Specialties:  General Surgery, Bariatrics  Why:  Wound check/Appt: 7/13/17 at 08:15 AM.   Contact information:  [address]  [facility] [address]  [phone]                 Patient Instructions:     Diet general   Order Comments: For the first week after discharge, you may have a full liquid diet.  For the following week and until your post-op appointment with Dr. Jara, you may have a soft food diet.     Shower on day dressing removed (No bath)   Order Comments: Keep incisions clean and dry for 48 hours.  After 48 hours you may shower over the incisions; however, do not submerge incisions under water (for example pool, bath, lake, etc) for a total of 2 weeks after surgery.     Lifting restrictions   Order Comments: Do not lift anything heavier than 10 pounds for 6 weeks after surgery.     Other restrictions (specify):   Order Comments: Crush all medications.    Do not drive while taking narcotic pain medications.     Call MD for:  redness, tenderness, or signs of infection (pain, swelling, redness, odor or green/yellow discharge around incision site)     Call MD for:  severe uncontrolled pain     Call MD for:  persistent nausea and vomiting or diarrhea     Call MD for:  temperature >100.4     Call MD for:  difficulty breathing or increased cough     Remove dressing in 24 hours   Order Comments: OK to remove outer bandages in 48 hours prior to showering.  There are small strips of white tape directly over the incisions.  Allow these to fall off on their own.  This process could take up to 2 weeks.       Medications:  Reconciled Home Medications:   Current Discharge Medication List      START taking these medications    Details   omeprazole (PRILOSEC) 20 MG capsule Take 1 capsule (20 mg total) by mouth  once daily.  Qty: 30 capsule, Refills: 11      ondansetron (ZOFRAN-ODT) 8 MG TbDL Take 1 tablet (8 mg total) by mouth every 6 (six) hours as needed.  Qty: 40 tablet, Refills: 0      oxycodone (ROXICODONE) 5 mg/5 mL Soln Take 5 mLs (5 mg total) by mouth every 4 (four) hours as needed (pain).  Qty: 300 mL, Refills: 0      polyethylene glycol (GLYCOLAX) 17 gram/dose powder Take 17 g by mouth once daily.  Qty: 1 Bottle, Refills: 0      promethazine (PHENERGAN) 25 MG suppository Place 1 suppository (25 mg total) rectally every 6 (six) hours as needed.  Qty: 40 suppository, Refills: 0         CONTINUE these medications which have NOT CHANGED    Details   aspirin (ECOTRIN) 325 MG EC tablet Take 325 mg by mouth once daily.      atorvastatin (LIPITOR) 40 MG tablet Take 40 mg by mouth once daily. One a day or as directed      carvedilol (COREG) 12.5 MG tablet Take 1 tablet (12.5 mg total) by mouth 2 (two) times daily. .5-1 twice daily or as directed  Qty: 180 tablet, Refills: 3    Associated Diagnoses: Coronary artery disease involving native coronary artery of native heart without angina pectoris; HTN (hypertension), benign      gabapentin (NEURONTIN) 300 MG capsule TAKE 2 CAPSULES BY MOUTH 3 TIMES A DAY  Refills: 2      testosterone cypionate 200 mg/mL Kit Inject into the muscle.      amoxicillin (AMOXIL) 500 MG capsule TAKE ONE CAPSULE BY MOUTH EVERY 12 HOURS AND 4CAPS 1 HOUR BEFORE DENTAL APPT  Refills: 1      nitroGLYCERIN (NITROSTAT) 0.4 MG SL tablet Place 1 tablet (0.4 mg total) under the tongue every 5 (five) minutes as needed for Chest pain.  Qty: 25 tablet, Refills: 3    Associated Diagnoses: Coronary artery disease involving native coronary artery of native heart without angina pectoris; HTN (hypertension), benign; S/P CABG x 1      rizatriptan (MAXALT) 10 MG tablet Take 10 mg by mouth as needed for Migraine.           Time spent on the discharge of patient: 10 minutes    Marcial Pelayo Jr., MD  General  Surgery  [facility]

## 2017-06-29 NOTE — ASSESSMENT & PLAN NOTE
-Progress per pathway  -Bariatric clear liquid diet early this AM.  If tolerates, then will advance to full liquid diet.  -CRUSH ALL MEDICATIONS  -Transition PCA to Hycet for pain  -Transition antiemetics to PO/AR formulations  -Ambulate  -Incentive spirometry  -DVT and GI ppx  -Possible discharge home today; diet on discharge will be FLD for 1 week then soft diet for 1 week at which point he will then see us in clinic for post-op appt.

## 2017-06-29 NOTE — PLAN OF CARE
Patient lives in a 2 story house w/Spouse. Spouse at . Discharging to home w/no needs today, per Dr. Mcdermott.     [facility] given to patient after informed about it;patient verbalized their understanding.        06/29/17 1120   Discharge Assessment   Assessment Type Discharge Planning Assessment   Confirmed/corrected address and phone number on facesheet? Yes   Assessment information obtained from? Patient;Medical Record   Expected Length of Stay (days) (1)   Communicated expected length of stay with patient/caregiver yes   Type of Healthcare Directive Received (Unknown)   Prior to hospitilization cognitive status: Alert/Oriented;No Deficits   Prior to hospitalization functional status: Independent   Current cognitive status: Alert/Oriented;No Deficits   Current Functional Status: Independent   Arrived From home or self-care   Lives With spouse   Able to Return to Prior Arrangements yes   Is patient able to care for self after discharge? Yes   How many people do you have in your home that can help with your care after discharge? 1   Who are your caregiver(s) and their phone number(s)? (Spouse: Renuka ZAMORANO [phone]. )   Patient's perception of discharge disposition home or selfcare   Readmission Within The Last 30 Days no previous admission in last 30 days   Patient currently being followed by outpatient case management? No   Patient currently receives home health services? No   Does the patient currently use HME? No   Patient currently receives private duty nursing? N/A   Patient currently receives any other outside agency services? No   Equipment Currently Used at Home none   Do you have any problems affording any of your prescribed medications? No   Is the patient taking medications as prescribed? yes   Do you have any financial concerns preventing you from receiving the healthcare you need? No   Does the patient have transportation to healthcare appointments? Yes   Transportation Available  car;family or friend will provide   On Dialysis? No   Does the patient receive services at the Coumadin Clinic? No   Are there any open cases? No   Discharge Plan A Home with family   Discharge Plan B Home with family   Patient/Family In Agreement With Plan yes

## 2017-06-29 NOTE — PROGRESS NOTES
Pt discharged. Instructions and prescriptions given and explained. Pt verbalized understanding with no questions. Pt AAOx3, waiting on filled prescriptions

## 2017-06-29 NOTE — HOSPITAL COURSE
Patient was observed overnight.  He had no acute events overnight.  When he started his clear liquids on the morning of post-operative day #1, he developed nausea without vomiting.  He received Zofran, and had no more issues with nausea.  He was able to tolerated a full liquid diet, ambulate independently, void, and have well controlled pain. He was deemed stable for discharged on post-operative day #1.

## 2017-06-29 NOTE — PLAN OF CARE
Problem: Patient Care Overview  Goal: Plan of Care Review  Outcome: Ongoing (interventions implemented as appropriate)  Patient progressing with POC. Pain controlled with PCA infusion. Vital signs stable. Afebrile. Safety and fall precautions active. SCDs in place. Call light in reach. Will continue to monitor per frequent rounding.

## 2017-06-29 NOTE — SUBJECTIVE & OBJECTIVE
Interval History: No acute events overnight.  Drank almost 1L this morning and had some nausea but no emesis.  Ambulating.  Voiding.  Pain well controlled on PCA.    Medications:  Continuous Infusions:   sodium chloride 0.9% 125 mL/hr at 06/28/17 1023     Scheduled Meds:   aspirin  325 mg Oral Daily    atorvastatin  40 mg Oral Daily    carvedilol  12.5 mg Oral BID    enoxparin  40 mg Subcutaneous Q24H    famotidine (PF)  20 mg Intravenous BID    gabapentin  300 mg Oral TID     PRN Meds:hydrocodone-apap 7.5-325 MG/15 ML, ondansetron, promethazine     Review of patient's allergies indicates:  No Known Allergies  Objective:     Vital Signs (Most Recent):  Temp: 96.8 °F (36 °C) (06/29/17 0718)  Pulse: (!) 59 (06/29/17 0721)  Resp: 18 (06/29/17 0718)  BP: 132/86 (06/29/17 0718)  SpO2: 95 % (06/29/17 0718) Vital Signs (24h Range):  Temp:  [96.3 °F (35.7 °C)-97.7 °F (36.5 °C)] 96.8 °F (36 °C)  Pulse:  [51-72] 59  Resp:  [11-20] 18  SpO2:  [95 %-100 %] 95 %  BP: (127-160)/(73-91) 132/86     Weight: 87.5 kg (193 lb)  Body mass index is 29.35 kg/m².    Intake/Output - Last 3 Shifts       06/27 0700 - 06/28 0659 06/28 0700 - 06/29 0659 06/29 0700 - 06/30 0659    P.O.  0 30    I.V. (mL/kg)  1519 (17.4)     Total Intake(mL/kg)  1519 (17.4) 30 (0.3)    Urine (mL/kg/hr)  500 (0.2) 150 (0.5)    Emesis/NG output   0 (0)    Stool  0 (0) 0 (0)    Blood   0 (0)    Total Output   500 150    Net   +1019 -120           Stool Occurrence  0 x 0 x          Physical Exam   Constitutional: He appears well-developed and well-nourished. No distress.   HENT:   Head: Normocephalic and atraumatic.   Eyes: No scleral icterus.   Cardiovascular: Normal rate and regular rhythm.    Pulmonary/Chest: Effort normal. No respiratory distress.   Abdominal: Soft. He exhibits no distension. There is tenderness (incisional). There is no rebound and no guarding.   Incisions with surgical dressings in place with no drainage   Musculoskeletal: Normal range  of motion.   Neurological: He is alert.   Psychiatric: He has a normal mood and affect.   Nursing note and vitals reviewed.      Significant Labs:  CBC:   Recent Labs  Lab 06/29/17  0513   WBC 9.52   RBC 4.73   HGB 15.1   HCT 45.0      MCV 95   MCH 31.9*   MCHC 33.6     CMP:   Recent Labs  Lab 06/29/17  0513   *   CALCIUM 8.4*   *   K 3.9   CO2 27   *   BUN 8   CREATININE 1.2       Significant Diagnostics:  none to review

## 2017-06-29 NOTE — ASSESSMENT & PLAN NOTE
-Cardiology consulted due to intraoperative hypotension and rhythm abnormalities; appreciate consultation  -No evidence of bundle branch block or other abnormality necessitating intervention from Cardiology perspective  -No follow up needed

## 2017-06-29 NOTE — PROGRESS NOTES
[facility]  General Surgery  Progress Note    Subjective:     History of Present Illness:  Patient presented on 6/28 for hiatal hernia repair with Toupe fundoplication.    Post-Op Info:  Procedure(s) (LRB):  REPAIR-HERNIA-HIATAL-LAPAROSCOPIC w/ poss. mesh (N/A)  [id number] (N/A)   1 Day Post-Op     Interval History: No acute events overnight.  Drank almost 1L this morning and had some nausea but no emesis.  Ambulating.  Voiding.  Pain well controlled on PCA.    Medications:  Continuous Infusions:   sodium chloride 0.9% 125 mL/hr at 06/28/17 1023     Scheduled Meds:   aspirin  325 mg Oral Daily    atorvastatin  40 mg Oral Daily    carvedilol  12.5 mg Oral BID    enoxparin  40 mg Subcutaneous Q24H    famotidine (PF)  20 mg Intravenous BID    gabapentin  300 mg Oral TID     PRN Meds:hydrocodone-apap 7.5-325 MG/15 ML, ondansetron, promethazine     Review of patient's allergies indicates:  No Known Allergies  Objective:     Vital Signs (Most Recent):  Temp: 96.8 °F (36 °C) (06/29/17 0718)  Pulse: (!) 59 (06/29/17 0721)  Resp: 18 (06/29/17 0718)  BP: 132/86 (06/29/17 0718)  SpO2: 95 % (06/29/17 0718) Vital Signs (24h Range):  Temp:  [96.3 °F (35.7 °C)-97.7 °F (36.5 °C)] 96.8 °F (36 °C)  Pulse:  [51-72] 59  Resp:  [11-20] 18  SpO2:  [95 %-100 %] 95 %  BP: (127-160)/(73-91) 132/86     Weight: 87.5 kg (193 lb)  Body mass index is 29.35 kg/m².    Intake/Output - Last 3 Shifts       06/27 0700 - 06/28 0659 06/28 0700 - 06/29 0659 06/29 0700 - 06/30 0659    P.O.  0 30    I.V. (mL/kg)  1519 (17.4)     Total Intake(mL/kg)  1519 (17.4) 30 (0.3)    Urine (mL/kg/hr)  500 (0.2) 150 (0.5)    Emesis/NG output   0 (0)    Stool  0 (0) 0 (0)    Blood   0 (0)    Total Output   500 150    Net   +1019 -120           Stool Occurrence  0 x 0 x          Physical Exam   Constitutional: He appears well-developed and well-nourished. No distress.   HENT:   Head: Normocephalic and atraumatic.    Eyes: No scleral icterus.   Cardiovascular: Normal rate and regular rhythm.    Pulmonary/Chest: Effort normal. No respiratory distress.   Abdominal: Soft. He exhibits no distension. There is tenderness (incisional). There is no rebound and no guarding.   Incisions with surgical dressings in place with no drainage   Musculoskeletal: Normal range of motion.   Neurological: He is alert.   Psychiatric: He has a normal mood and affect.   Nursing note and vitals reviewed.      Significant Labs:  CBC:   Recent Labs  Lab 06/29/17  0513   WBC 9.52   RBC 4.73   HGB 15.1   HCT 45.0      MCV 95   MCH 31.9*   MCHC 33.6     CMP:   Recent Labs  Lab 06/29/17  0513   *   CALCIUM 8.4*   *   K 3.9   CO2 27   *   BUN 8   CREATININE 1.2       Significant Diagnostics:  none to review    Assessment/Plan:     HTN (hypertension), benign    -Restart home medications  -Crush all medications        Coronary artery disease involving native coronary artery    -Cardiology consulted due to intraoperative hypotension and rhythm abnormalities; appreciate consultation  -No evidence of bundle branch block or other abnormality necessitating intervention from Cardiology perspective  -No follow up needed        * Hiatal hernia    -Progress per pathway  -Bariatric clear liquid diet early this AM.  If tolerates, then will advance to full liquid diet.  -CRUSH ALL MEDICATIONS  -Transition PCA to Hycet for pain  -Transition antiemetics to PO/NC formulations  -Ambulate  -Incentive spirometry  -DVT and GI ppx  -Possible discharge home today; diet on discharge will be FLD for 1 week then soft diet for 1 week at which point he will then see us in clinic for post-op appt.            Marcial Pelayo Jr., MD  General Surgery  [facility]

## 2017-07-06 ENCOUNTER — TELEPHONE (OUTPATIENT)
Dept: CARDIOLOGY | Facility: CLINIC | Age: 67
End: 2017-07-06

## 2017-07-13 ENCOUNTER — OFFICE VISIT (OUTPATIENT)
Dept: SURGERY | Facility: CLINIC | Age: 67
End: 2017-07-13
Payer: MEDICARE

## 2017-07-13 VITALS
HEIGHT: 68 IN | HEART RATE: 60 BPM | WEIGHT: 193 LBS | DIASTOLIC BLOOD PRESSURE: 85 MMHG | SYSTOLIC BLOOD PRESSURE: 125 MMHG | BODY MASS INDEX: 29.25 KG/M2 | TEMPERATURE: 99 F

## 2017-07-13 DIAGNOSIS — Z09 POSTOP CHECK: Primary | ICD-10-CM

## 2017-07-13 PROBLEM — K44.9 HERNIA, HIATAL: Status: RESOLVED | Noted: 2017-06-13 | Resolved: 2017-07-13

## 2017-07-13 PROBLEM — K44.9 HIATAL HERNIA: Status: RESOLVED | Noted: 2017-06-28 | Resolved: 2017-07-13

## 2017-07-13 PROCEDURE — 99999 PR PBB SHADOW E&M-EST. PATIENT-LVL III: CPT | Mod: PBBFAC,,, | Performed by: SURGERY

## 2017-07-13 PROCEDURE — 99024 POSTOP FOLLOW-UP VISIT: CPT | Mod: S$GLB,,, | Performed by: SURGERY

## 2017-07-13 NOTE — PROGRESS NOTES
"Bryant King is a 66 y.o. male patient.   1. Postop check      Past Medical History:   Diagnosis Date    Coronary artery disease     Hyperlipidemia     Hypertension     Migraines      No past surgical history pertinent negatives on file.  Scheduled Meds:  Continuous Infusions:  PRN Meds:    Review of patient's allergies indicates:  No Known Allergies  There are no hospital problems to display for this patient.    Blood pressure 125/85, pulse 60, temperature 98.5 °F (36.9 °C), height 5' 8" (1.727 m), weight 87.5 kg (193 lb).    Subjective S/p laparoscopic hiatal hernia repair with mesh and Toupet fundoplication 6/29/17.  His preop symptoms are resolved.  He has taken zofran twice but has not had any dysphagia on a soft diet.  He denies heartburn and reflux.  Objective Wounds clear, abdomen benign.   Assessment & Plan He is happy with his procedure.  Advance diet as tolerated.  Light duty one more month.  Follow up one month.       Bill Jara MD  7/13/2017  "

## 2017-08-10 ENCOUNTER — OFFICE VISIT (OUTPATIENT)
Dept: SURGERY | Facility: CLINIC | Age: 67
End: 2017-08-10
Payer: MEDICARE

## 2017-08-10 VITALS
WEIGHT: 193 LBS | HEART RATE: 78 BPM | HEIGHT: 68 IN | SYSTOLIC BLOOD PRESSURE: 126 MMHG | TEMPERATURE: 99 F | BODY MASS INDEX: 29.25 KG/M2 | DIASTOLIC BLOOD PRESSURE: 82 MMHG

## 2017-08-10 DIAGNOSIS — Z09 POSTOP CHECK: Primary | ICD-10-CM

## 2017-08-10 PROCEDURE — 99999 PR PBB SHADOW E&M-EST. PATIENT-LVL III: CPT | Mod: PBBFAC,,, | Performed by: SURGERY

## 2017-08-10 PROCEDURE — 99024 POSTOP FOLLOW-UP VISIT: CPT | Mod: S$GLB,,, | Performed by: SURGERY

## 2017-08-10 NOTE — PROGRESS NOTES
"Bryant King is a 66 y.o. male patient.   1. Postop check      Past Medical History:   Diagnosis Date    Coronary artery disease     Hyperlipidemia     Hypertension     Migraines      No past surgical history pertinent negatives on file.  Scheduled Meds:  Continuous Infusions:  PRN Meds:    Review of patient's allergies indicates:  No Known Allergies  There are no hospital problems to display for this patient.    Blood pressure 126/82, pulse 78, temperature 98.5 °F (36.9 °C), height 5' 8" (1.727 m), weight 87.5 kg (193 lb).    Subjective S/p laparoscopic hiatal hernia repair with mesh and Toupet fundoplication 6/29/17.  He feels great but at times he has some dysphagia.  This happens when he eats too quickly.  He has no heartburn, nausea or vomiting.  Objective Wounds clear, abdomen benign.   Assessment & Plan Doing well after surgery.  Regular duty and follow up prn.       Bill Jara MD  8/10/2017  "

## 2017-08-10 NOTE — LETTER
[facility] - General Surgery  [address]  [facility] [address]  Phone: [phone] August 10, 2017      Eric Biswas Jr., MD  [address] [facility] [address]  [facility] [address]    Patient: Bryant King   MR Number: [id number]   YOB: 1950   Date of Visit: 8/10/2017     Dear Dr. Biswas:    Thank you for referring Bryant King to me for evaluation. Below are the relevant portions of my assessment and plan of care.    Patient is status post laparoscopic hiatal hernia repair with mesh and Toupet fundoplication 6/29/17.  He feels great but at times he has some dysphagia.  This happens when he eats too quickly.  He has no heartburn, nausea or vomiting.    PLAN: Patient is doing well after surgery.  Regular duty and follow up PRN.     If you have questions, please do not hesitate to call me. I look forward to following Bryant along with you.    Sincerely,      Bill Jara MD   Section Head - General, Laparoscopic, Bariatric  Acute Care and Oncologic Surgery   - Surgical Weight Loss Program  [facility]    WSR/margot    CC  MD Duane Douglas MD

## 2017-09-13 ENCOUNTER — TELEPHONE (OUTPATIENT)
Dept: CARDIOLOGY | Facility: CLINIC | Age: 67
End: 2017-09-13

## 2017-09-13 NOTE — TELEPHONE ENCOUNTER
Message from Dr. Villalba given to the pt's wife and she verbalized understanding. Will call back with any concerns or questions.

## 2017-09-13 NOTE — TELEPHONE ENCOUNTER
Dr. Villalba, I spoke with the pt's wife - says that occasionally the pt c/o lightheadedness when getting out of the car.  Says that she just noticed that the instructions on carvedilol 12.5 mg tablet is to take .5 to one tablet twice a daily. Would like to reduce dose to one half of 12.5 mg tablet twice a day. Says recent BP is 132/80 and has no HR reading available. Advised wife to check and record BP and HR once a week and if the pt has any symptoms. Wife verbalized understanding. Please advise. Thanks, Preeti

## 2017-09-13 NOTE — TELEPHONE ENCOUNTER
----- Message from Joanalinda Abdalla sent at 9/13/2017  9:51 AM CDT -----  Contact: Wife of pt called  Wife of pt called, requesting a RX refill for atorvastatin 20 mg. [facility]. Pt's wife continues to say that pt's RX carvedilol is causing lightheadedness and will like to discuss changing medication.Also she is needing to clarify medication's directions. Pt of Dr. Villalba and LOV 4/25/17. Ph for pt is [phone]. Thank you

## 2018-01-23 ENCOUNTER — OFFICE VISIT (OUTPATIENT)
Dept: URGENT CARE | Facility: CLINIC | Age: 68
End: 2018-01-23
Payer: MEDICARE

## 2018-01-23 VITALS
RESPIRATION RATE: 16 BRPM | HEIGHT: 68 IN | DIASTOLIC BLOOD PRESSURE: 86 MMHG | SYSTOLIC BLOOD PRESSURE: 120 MMHG | BODY MASS INDEX: 30.31 KG/M2 | WEIGHT: 200 LBS | HEART RATE: 64 BPM | OXYGEN SATURATION: 95 % | TEMPERATURE: 97 F

## 2018-01-23 DIAGNOSIS — S51.011A LACERATION OF ELBOW, RIGHT, INITIAL ENCOUNTER: Primary | ICD-10-CM

## 2018-01-23 PROCEDURE — 99213 OFFICE O/P EST LOW 20 MIN: CPT | Mod: 25,S$GLB,, | Performed by: FAMILY MEDICINE

## 2018-01-23 PROCEDURE — 12031 INTMD RPR S/A/T/EXT 2.5 CM/<: CPT | Mod: S$GLB,,, | Performed by: FAMILY MEDICINE

## 2018-01-23 RX ORDER — CEPHALEXIN 500 MG/1
500 CAPSULE ORAL 4 TIMES DAILY
Qty: 40 CAPSULE | Refills: 0 | Status: SHIPPED | OUTPATIENT
Start: 2018-01-23 | End: 2018-01-23 | Stop reason: SDUPTHER

## 2018-01-23 RX ORDER — CEPHALEXIN 500 MG/1
500 CAPSULE ORAL EVERY 8 HOURS
Qty: 40 CAPSULE | Refills: 0 | Status: SHIPPED | OUTPATIENT
Start: 2018-01-23 | End: 2018-02-02

## 2018-01-23 NOTE — PROGRESS NOTES
"Subjective:       Patient ID: Bryant King is a 67 y.o. male.    Vitals:  height is 5' 8" (1.727 m) and weight is 90.7 kg (200 lb). His temperature is 97.1 °F (36.2 °C). His blood pressure is 120/86 and his pulse is 64. His respiration is 16 and oxygen saturation is 95%.     Chief Complaint: Trauma (RT ELBOW)    SMALL LACERATION TO POSTERIOR RT ELBOW      Trauma   The incident occurred 1 to 3 hours ago. The incident occurred in the street. The injury mechanism was a fall. There is an injury to the right elbow (POSTERIOR RT ELBOW). He is experiencing no pain. It is unlikely that a foreign body is present. Pertinent negatives include no abdominal pain, chest pain, neck pain, numbness or weakness. His tetanus status is unknown.     Review of Systems   Constitution: Negative for weakness and malaise/fatigue.   HENT: Negative for nosebleeds.    Cardiovascular: Negative for chest pain and syncope.   Respiratory: Negative for shortness of breath.    Musculoskeletal: Negative for back pain, joint pain and neck pain.        SMALL LACERATION TO POSTERIOR RT ELBOW AFTER A FALL   Gastrointestinal: Negative for abdominal pain.   Genitourinary: Negative for hematuria.   Neurological: Negative for dizziness and numbness.       Objective:      Physical Exam   Constitutional: He is oriented to person, place, and time. He appears well-developed and well-nourished. He is cooperative.  Non-toxic appearance. He does not appear ill. No distress.   HENT:   Head: Normocephalic and atraumatic.   Right Ear: Hearing, tympanic membrane, external ear and ear canal normal.   Left Ear: Hearing, tympanic membrane, external ear and ear canal normal.   Nose: Nose normal. No mucosal edema, rhinorrhea or nasal deformity. No epistaxis. Right sinus exhibits no maxillary sinus tenderness and no frontal sinus tenderness. Left sinus exhibits no maxillary sinus tenderness and no frontal sinus tenderness.   Mouth/Throat: Uvula is midline, oropharynx is " clear and moist and mucous membranes are normal. No trismus in the jaw. Normal dentition. No uvula swelling. No posterior oropharyngeal erythema.   Eyes: Conjunctivae and lids are normal. Right eye exhibits no discharge. Left eye exhibits no discharge. No scleral icterus.   Sclera clear bilat   Neck: Trachea normal, normal range of motion, full passive range of motion without pain and phonation normal. Neck supple.   Cardiovascular: Normal rate, regular rhythm, normal heart sounds, intact distal pulses and normal pulses.    Pulmonary/Chest: Effort normal and breath sounds normal. No respiratory distress.   Abdominal: Soft. Normal appearance and bowel sounds are normal. He exhibits no distension, no pulsatile midline mass and no mass. There is no tenderness.   Musculoskeletal: Normal range of motion. He exhibits no edema or deformity.   Right elbow posteriorly around olecraron 1 1/2  cm open wound,   Mild bleeding, nv intact     Neurological: He is alert and oriented to person, place, and time. He exhibits normal muscle tone. Coordination normal.   Skin: Skin is warm, dry and intact. He is not diaphoretic. No pallor.   Psychiatric: He has a normal mood and affect. His speech is normal and behavior is normal. Judgment and thought content normal. Cognition and memory are normal.   Nursing note and vitals reviewed.      Assessment:       1. Laceration of elbow, right, initial encounter        Plan:         Laceration of elbow, right, initial encounter  -     LACERATION REPAIR    Other orders  -     Discontinue: cephALEXin (KEFLEX) 500 MG capsule; Take 1 capsule (500 mg total) by mouth 4 (four) times daily.  Dispense: 40 capsule; Refill: 0  -     cephALEXin (KEFLEX) 500 MG capsule; Take 1 capsule (500 mg total) by mouth every 8 (eight) hours.  Dispense: 40 capsule; Refill: 0        Wound care instruction  RTC in 10 days for  Suture removal

## 2018-01-23 NOTE — PATIENT INSTRUCTIONS
Laceration: All Closures  A laceration is a cut through the skin. This will usually require stitches (sutures) or staples if it is deep. Minor cuts may be treated with a surgical tape closure or skin glue.    Home care  · Your healthcare provider may prescribe an antibiotic. This is to help prevent infection. Follow all instructions for taking this medicine. Take the medicine every day until it is gone or you are told to stop. You should not have any left over.  · The healthcare provider may prescribe medicines for pain. Follow instructions for taking them.  · Follow the healthcare providers instructions on how to care for the cut.  · Keep the wound clean and dry. Do not get the wound wet until you are told it is okay to do so. If the area gets wet, gently pat it dry with a clean cloth. Replace the wet bandage with a dry one.  · If a bandage was applied and it becomes wet or dirty, replace it. Otherwise, leave it in place for the first 24 hours.  · Caring for sutures or staples: Once you no longer need to keep them dry, clean the wound daily. First, remove the bandage. Then wash the area gently with soap and warm water, or as directed by the health care provider. Use a wet cotton swab to loosen and remove any blood or crust that forms. After cleaning, apply a thin layer of antibiotic ointment if advised. Then put on a new bandage unless you are told not to.  · Caring for skin glue: Dont put apply liquid, ointment, or cream on the wound while the glue is in place. Avoid activities that cause heavy sweating. Protect the wound from sunlight. Do not scratch, rub, or pick at the adhesive film. Do not place tape directly over the film. The glue should peel off within 5 to 10 days.   · Caring for surgical tape: Keep the area dry. If it gets wet, blot it dry with a clean towel. Surgical tape usually falls off within 7 to 10 days. If it has not fallen off after 10 days, you can take it off yourself. Put mineral oil or  petroleum jelly on a cotton ball and gently rub the tape until it is removed.  · Once you can get the wound wet, you may shower as usual but do not soak the wound in water (no tub baths or swimming)  · Even with proper treatment, a wound infection may sometimes occur. Check the wound daily for signs of infection listed below.  Scalp wounds  During the first two days, you may carefully rinse your hair in the shower to remove blood, glass or dirt particles. After two days, you may shower and shampoo your hair normally. Do not soak your scalp in the tub or go swimming until the stitches or staples have been removed. Talk with your healthcare provider before applying any antibiotic ointment to the wound.  Mouth wounds  Eat soft foods to reduce pain. If the cut is inside of your mouth, clean by rinsing after each meal and at bedtime with a mixture of equal parts water and hydrogen peroxide (do not swallow!). Or, you can use a cotton swab to directly apply hydrogen peroxide onto the cut. Mouth wounds can be painful when eating. You may use an over-the-counter local numbing solution for pain relief. If this is not available, you may use any numbing solution intended for teething babies. You may apply this directly to the sores with a cotton-tip swab or with your finger.  Follow-up care  Follow up with your healthcare provider as advised. Ask your healthcare provider how long sutures should be left in place. Be sure to return for suture removal as directed. If dissolving stitches were used in the mouth, these should fall out or dissolve without the need for removal. If tape closures were used, remove them yourself when your provider recommends if they have not fallen off on their own. If skin glue was used, the film will wear off by itself.  When to seek medical advice  Call your healthcare provider right away if any of these occur:  · Wound bleeding not controlled by direct pressure  · Signs of infection, including  increasing pain in the wound, increasing wound redness or swelling, or pus or bad odor coming from the wound  · Fever of 100.4°F (38.ºC) or higher or as directed by your healthcare provider  · Stitches or staples come apart or fall out or surgical tape falls off before 7 days  · Wound edges re-open  · Wound changes colors  · Numbness around the wound   · Decreased movement around the injured area  Date Last Reviewed: 6/14/2015 © 2000-2017 [employer]. [address] [place] [address]. All rights reserved. This information is not intended as a substitute for professional medical care. Always follow your healthcare professional's instructions.

## 2018-01-23 NOTE — PROCEDURES
Laceration Repair  Date/Time: 1/23/2018 3:25 PM  Performed by: CHRISTEN SANTIAGO  Authorized by: CHRISTEN SANTIAGO   Body area: upper extremity  Location details: right elbow  Laceration length: 1.5 cm  Foreign bodies: no foreign bodies  Tendon involvement: none  Nerve involvement: none  Vascular damage: no  Anesthesia: local infiltration    Anesthesia:  Local Anesthetic: lidocaine 1% without epinephrine  Anesthetic total: 3 mL  Patient sedated: no  Irrigation solution: saline  Irrigation method: syringe  Amount of cleaning: standard  Debridement: minimal  Degree of undermining: none  Skin closure: 4-0 Prolene  Number of sutures: 4  Technique: horizontal mattress  Approximation: close  Approximation difficulty: simple  Dressing: 4x4 sterile gauze  Patient tolerance: Patient tolerated the procedure well with no immediate complications

## 2018-02-05 ENCOUNTER — CLINICAL SUPPORT (OUTPATIENT)
Dept: URGENT CARE | Facility: CLINIC | Age: 68
End: 2018-02-05
Payer: MEDICARE

## 2018-02-05 VITALS
RESPIRATION RATE: 18 BRPM | TEMPERATURE: 97 F | BODY MASS INDEX: 30.31 KG/M2 | HEART RATE: 67 BPM | HEIGHT: 68 IN | OXYGEN SATURATION: 97 % | SYSTOLIC BLOOD PRESSURE: 114 MMHG | DIASTOLIC BLOOD PRESSURE: 84 MMHG | WEIGHT: 200 LBS

## 2018-02-05 DIAGNOSIS — Z48.02 ENCOUNTER FOR REMOVAL OF SUTURES: Primary | ICD-10-CM

## 2018-02-05 PROCEDURE — 99024 POSTOP FOLLOW-UP VISIT: CPT | Mod: S$GLB,,, | Performed by: FAMILY MEDICINE

## 2018-02-05 NOTE — PROGRESS NOTES
"Subjective:       Patient ID: Bryant King is a 67 y.o. male.    Vitals:  height is 5' 8" (1.727 m) and weight is 90.7 kg (200 lb). His tympanic temperature is 96.6 °F (35.9 °C). His blood pressure is 114/84 and his pulse is 67. His respiration is 18 and oxygen saturation is 97%.     Chief Complaint: Suture / Staple Removal    Suture / Staple Removal   The sutures were placed 11 to 14 days ago. He tried regular peroxide and water cleansings and antibiotic ointment use since the wound repair. The treatment provided moderate relief. His temperature was unmeasured prior to arrival. There has been no drainage from the wound. There is no redness present. There is no swelling present. There is no pain present.     Review of Systems   Constitution: Negative for fever.   Eyes: Negative for discharge.   Skin: Negative for unusual hair distribution.       Objective:      Physical Exam   Well healed laceration.  OK for removal,  See ntoed.   Assessment:       1. Encounter for removal of sutures        Plan:         Encounter for removal of sutures  -     SUTURE REMOVAL    Other orders  -     Cancel: SUTURE REMOVAL      Follow Up Comments   Make sure that you follow up with your primary care doctor in the next 2-5 days if needed .  Return to the Urgent Care if signs or symptoms change and certainly if you have worsening symptoms go to the nearest emergency department for further evaluation.        "

## 2018-02-06 NOTE — PROCEDURES
Suture Removal  Date/Time: 2/5/2018 9:52 PM  Location procedure was performed: [facility]  Performed by: JACKIE JASON  Authorized by: JACKIE JASON   Pre-operative diagnosis: laceration right elbow  Post-operative diagnosis: same  Body area: upper extremity  Location details: right elbow  Description of findings: clean and well healed with small amount of scabbing.    Wound Appearance: clean, well healed, nontender and normal color  Sutures Removed: 4  Post-removal: bandaid applied  Facility: sutures placed in this facility  Complications: No  Specimens: No  Implants: No  Patient tolerance: Patient tolerated the procedure well with no immediate complications

## 2018-04-30 ENCOUNTER — TELEPHONE (OUTPATIENT)
Dept: ORTHOPEDICS | Facility: CLINIC | Age: 68
End: 2018-04-30

## 2018-04-30 NOTE — TELEPHONE ENCOUNTER
----- Message from Irena Mckeon sent at 4/30/2018 12:39 PM CDT -----  Contact: Self  Pt is calling to speak with you regarding her sister-in-law's hip issues.    He can reached at [phone].    Thank you.

## 2018-05-18 DIAGNOSIS — I10 HTN (HYPERTENSION), BENIGN: ICD-10-CM

## 2018-05-18 DIAGNOSIS — I25.10 CORONARY ARTERY DISEASE INVOLVING NATIVE CORONARY ARTERY OF NATIVE HEART WITHOUT ANGINA PECTORIS: ICD-10-CM

## 2018-05-18 RX ORDER — ATORVASTATIN CALCIUM 40 MG/1
40 TABLET, FILM COATED ORAL DAILY
Qty: 90 TABLET | Refills: 3 | Status: SHIPPED | OUTPATIENT
Start: 2018-05-18 | End: 2019-05-27 | Stop reason: SDUPTHER

## 2018-05-18 RX ORDER — CARVEDILOL 12.5 MG/1
12.5 TABLET ORAL 2 TIMES DAILY
Qty: 180 TABLET | Refills: 3 | Status: SHIPPED | OUTPATIENT
Start: 2018-05-18 | End: 2018-05-23 | Stop reason: SDUPTHER

## 2018-05-22 DIAGNOSIS — I10 HYPERTENSION, UNSPECIFIED TYPE: ICD-10-CM

## 2018-05-22 DIAGNOSIS — E78.5 DYSLIPIDEMIA: Primary | ICD-10-CM

## 2018-05-23 DIAGNOSIS — I25.10 CORONARY ARTERY DISEASE INVOLVING NATIVE CORONARY ARTERY OF NATIVE HEART WITHOUT ANGINA PECTORIS: ICD-10-CM

## 2018-05-23 DIAGNOSIS — I10 HTN (HYPERTENSION), BENIGN: ICD-10-CM

## 2018-05-23 RX ORDER — CARVEDILOL 12.5 MG/1
TABLET ORAL
Qty: 180 TABLET | Refills: 3 | Status: SHIPPED | OUTPATIENT
Start: 2018-05-23 | End: 2019-06-03 | Stop reason: SDUPTHER

## 2018-05-29 ENCOUNTER — TELEPHONE (OUTPATIENT)
Dept: CARDIOLOGY | Facility: CLINIC | Age: 68
End: 2018-05-29

## 2018-05-29 NOTE — TELEPHONE ENCOUNTER
----- Message from Raji Varela sent at 5/29/2018 10:48 AM CDT -----  Contact: patient  Please call pt at [phone]. Patient has questions regarding Carvedilol 12.5 mg. Last seen Dr Villalba 04/25/17    Thank you

## 2018-05-29 NOTE — TELEPHONE ENCOUNTER
Answered questions about his Dx's of HTN,benign and about Core[employer] to assist him in filling out a life insurance policy.Offered to schedule a f/u appt the patient but the date he requested was not open yet,pt said he would call back to schedule his f/u in a week or so.

## 2018-08-10 ENCOUNTER — LAB VISIT (OUTPATIENT)
Dept: LAB | Facility: HOSPITAL | Age: 68
End: 2018-08-10
Attending: INTERNAL MEDICINE
Payer: MEDICARE

## 2018-08-10 DIAGNOSIS — E78.5 DYSLIPIDEMIA: ICD-10-CM

## 2018-08-10 DIAGNOSIS — I10 HYPERTENSION, UNSPECIFIED TYPE: ICD-10-CM

## 2018-08-10 LAB
ALBUMIN SERPL BCP-MCNC: 4 G/DL
ALP SERPL-CCNC: 63 U/L
ALT SERPL W/O P-5'-P-CCNC: 31 U/L
ANION GAP SERPL CALC-SCNC: 6 MMOL/L
AST SERPL-CCNC: 24 U/L
BILIRUB SERPL-MCNC: 0.7 MG/DL
BUN SERPL-MCNC: 19 MG/DL
CALCIUM SERPL-MCNC: 9.3 MG/DL
CHLORIDE SERPL-SCNC: 107 MMOL/L
CHOLEST SERPL-MCNC: 127 MG/DL
CHOLEST/HDLC SERPL: 4 {RATIO}
CO2 SERPL-SCNC: 30 MMOL/L
CREAT SERPL-MCNC: 1.3 MG/DL
EST. GFR  (AFRICAN AMERICAN): >60 ML/MIN/1.73 M^2
EST. GFR  (NON AFRICAN AMERICAN): 56 ML/MIN/1.73 M^2
GLUCOSE SERPL-MCNC: 108 MG/DL
HDLC SERPL-MCNC: 32 MG/DL
HDLC SERPL: 25.2 %
LDLC SERPL CALC-MCNC: 73.6 MG/DL
NONHDLC SERPL-MCNC: 95 MG/DL
POTASSIUM SERPL-SCNC: 4.1 MMOL/L
PROT SERPL-MCNC: 6.7 G/DL
SODIUM SERPL-SCNC: 143 MMOL/L
TRIGL SERPL-MCNC: 107 MG/DL
TSH SERPL DL<=0.005 MIU/L-ACNC: 2.25 UIU/ML

## 2018-08-10 PROCEDURE — 80053 COMPREHEN METABOLIC PANEL: CPT

## 2018-08-10 PROCEDURE — 36415 COLL VENOUS BLD VENIPUNCTURE: CPT

## 2018-08-10 PROCEDURE — 84443 ASSAY THYROID STIM HORMONE: CPT

## 2018-08-10 PROCEDURE — 80061 LIPID PANEL: CPT

## 2018-08-13 NOTE — PROGRESS NOTES
Subjective:   Patient ID:  Bryant King is a 68 y.o. male who presents for follow-up of CVD    HPI:The patient is here for CAD/VHD.      The patient has no chest pain, SOB, TIA, palpitations, syncope or pre-syncope.Patient currently exercises many times per week.        Review of Systems   Constitution: Negative for chills, decreased appetite, diaphoresis, fever, weakness, malaise/fatigue, night sweats, weight gain and weight loss.   HENT: Negative for congestion, hoarse voice, nosebleeds, sore throat and tinnitus.    Eyes: Negative for blurred vision, double vision, vision loss in left eye, vision loss in right eye, visual disturbance and visual halos.   Cardiovascular: Negative for chest pain, claudication, cyanosis, dyspnea on exertion, irregular heartbeat, leg swelling, near-syncope, orthopnea, palpitations, paroxysmal nocturnal dyspnea and syncope.   Respiratory: Negative for cough, hemoptysis, shortness of breath, sleep disturbances due to breathing, snoring, sputum production and wheezing.    Endocrine: Negative for cold intolerance, heat intolerance, polydipsia, polyphagia and polyuria.   Hematologic/Lymphatic: Negative for adenopathy and bleeding problem. Does not bruise/bleed easily.   Skin: Negative for color change, dry skin, flushing, itching, nail changes, poor wound healing, rash, skin cancer, suspicious lesions and unusual hair distribution.   Musculoskeletal: Negative for arthritis, back pain, falls, gout, joint pain, joint swelling, muscle cramps, muscle weakness, myalgias and stiffness.   Gastrointestinal: Negative for abdominal pain, anorexia, change in bowel habit, constipation, diarrhea, dysphagia, heartburn, hematemesis, hematochezia, melena and vomiting.   Genitourinary: Negative for decreased libido, dysuria, hematuria, hesitancy and urgency.   Neurological: Negative for excessive daytime sleepiness, dizziness, focal weakness, headaches, light-headedness, loss of balance, numbness,  "paresthesias, seizures, sensory change, tremors and vertigo.   Psychiatric/Behavioral: Negative for altered mental status, depression, hallucinations, memory loss, substance abuse and suicidal ideas. The patient does not have insomnia and is not nervous/anxious.    Allergic/Immunologic: Negative for environmental allergies and hives.       Objective: /80 (BP Location: Left arm, Patient Position: Sitting, BP Method: Large (Manual))   Pulse (!) 56   Ht 5' 8.5" (1.74 m)   Wt 94 kg (207 lb 3.7 oz)   BMI 31.05 kg/m²      Physical Exam   Constitutional: He is oriented to person, place, and time. He appears well-developed and well-nourished. No distress.   HENT:   Head: Normocephalic.   Eyes: EOM are normal. Pupils are equal, round, and reactive to light.   Neck: Normal range of motion. No thyromegaly present.   Cardiovascular: Normal rate, regular rhythm and intact distal pulses. Exam reveals no gallop and no friction rub.   Murmur heard.   Systolic murmur is present with a grade of 1/6.  Pulses:       Carotid pulses are 3+ on the right side, and 3+ on the left side.       Radial pulses are 3+ on the right side, and 3+ on the left side.        Femoral pulses are 3+ on the right side, and 3+ on the left side.       Popliteal pulses are 3+ on the right side, and 3+ on the left side.        Dorsalis pedis pulses are 3+ on the right side, and 3+ on the left side.        Posterior tibial pulses are 3+ on the right side, and 3+ on the left side.   Pulmonary/Chest: Effort normal and breath sounds normal. No respiratory distress. He has no wheezes. He has no rales. He exhibits no tenderness.   Abdominal: Soft. He exhibits no distension and no mass. There is no tenderness.   Musculoskeletal: Normal range of motion.   Lymphadenopathy:     He has no cervical adenopathy.   Neurological: He is alert and oriented to person, place, and time.   Skin: Skin is warm. He is not diaphoretic. No cyanosis. Nails show no clubbing. "   Psychiatric: He has a normal mood and affect. His speech is normal and behavior is normal. Judgment and thought content normal. Cognition and memory are normal.       Assessment:     1. Coronary artery disease involving native coronary artery of native heart without angina pectoris    2. S/P CABG x 1    3. S/P AVR (aortic valve replacement)    4. HTN (hypertension), benign    5. Dyslipidemia    6. Overweight (BMI 25.0-29.9)    7. Impaired fasting blood sugar        Plan:   Discussed diet , achieving and maintaining ideal body weight, and exercise.   We reviewed meds in detail.  Reassured-discussed goals, option , plan.  We have discussed the need for endocarditis prophylaxis.     Increase atorva to whole all days        Bryant was seen today for hyperlipidemia.    Diagnoses and all orders for this visit:    Coronary artery disease involving native coronary artery of native heart without angina pectoris  -     Comprehensive metabolic panel; Standing  -     TSH; Future; Expected date: 10/15/2019  -     PSA, Screening; Future; Expected date: 10/15/2019  -     Exercise stress echo with color flow; Future; Expected date: 10/15/2019    S/P CABG x 1  -     Exercise stress echo with color flow; Future; Expected date: 10/15/2019    S/P AVR (aortic valve replacement)  -     Exercise stress echo with color flow; Future; Expected date: 10/15/2019    HTN (hypertension), benign  -     Lipid panel; Standing  -     Comprehensive metabolic panel; Standing  -     TSH; Future; Expected date: 10/15/2019  -     Exercise stress echo with color flow; Future; Expected date: 10/15/2019    Dyslipidemia  -     Lipid panel; Standing  -     Comprehensive metabolic panel; Standing  -     TSH; Future; Expected date: 10/15/2019    Overweight (BMI 25.0-29.9)  -     Comprehensive metabolic panel; Standing  -     TSH; Future; Expected date: 10/15/2019    Impaired fasting blood sugar  -     Lipid panel; Standing  -     Comprehensive metabolic panel;  Standing  -     TSH; Future; Expected date: 10/15/2019  -     PSA, Screening; Future; Expected date: 10/15/2019  -     Hemoglobin A1c; Standing  -     Exercise stress echo with color flow; Future; Expected date: 10/15/2019            Follow-up in about 15 months (around 11/15/2019) for with labs and Stress CFD Deejay Villalba to read;comp lipids A1C in 4 months.

## 2018-08-15 ENCOUNTER — OFFICE VISIT (OUTPATIENT)
Dept: CARDIOLOGY | Facility: CLINIC | Age: 68
End: 2018-08-15
Payer: MEDICARE

## 2018-08-15 VITALS
HEIGHT: 69 IN | BODY MASS INDEX: 30.7 KG/M2 | DIASTOLIC BLOOD PRESSURE: 80 MMHG | HEART RATE: 56 BPM | SYSTOLIC BLOOD PRESSURE: 114 MMHG | WEIGHT: 207.25 LBS

## 2018-08-15 DIAGNOSIS — Z95.2 S/P AVR (AORTIC VALVE REPLACEMENT): ICD-10-CM

## 2018-08-15 DIAGNOSIS — R73.01 IMPAIRED FASTING BLOOD SUGAR: ICD-10-CM

## 2018-08-15 DIAGNOSIS — I25.10 CORONARY ARTERY DISEASE INVOLVING NATIVE CORONARY ARTERY OF NATIVE HEART WITHOUT ANGINA PECTORIS: Primary | ICD-10-CM

## 2018-08-15 DIAGNOSIS — E66.3 OVERWEIGHT (BMI 25.0-29.9): ICD-10-CM

## 2018-08-15 DIAGNOSIS — I10 HTN (HYPERTENSION), BENIGN: ICD-10-CM

## 2018-08-15 DIAGNOSIS — E78.5 DYSLIPIDEMIA: ICD-10-CM

## 2018-08-15 DIAGNOSIS — Z95.1 S/P CABG X 1: ICD-10-CM

## 2018-08-15 PROCEDURE — 3079F DIAST BP 80-89 MM HG: CPT | Mod: CPTII,S$GLB,, | Performed by: INTERNAL MEDICINE

## 2018-08-15 PROCEDURE — 99999 PR PBB SHADOW E&M-EST. PATIENT-LVL IV: CPT | Mod: PBBFAC,,, | Performed by: INTERNAL MEDICINE

## 2018-08-15 PROCEDURE — 3074F SYST BP LT 130 MM HG: CPT | Mod: CPTII,S$GLB,, | Performed by: INTERNAL MEDICINE

## 2018-08-15 PROCEDURE — 99215 OFFICE O/P EST HI 40 MIN: CPT | Mod: S$GLB,,, | Performed by: INTERNAL MEDICINE

## 2018-08-15 NOTE — PATIENT INSTRUCTIONS
Discussed diet , achieving and maintaining ideal body weight, and exercise.   We reviewed meds in detail.  Reassured-discussed goals, option , plan.  We have discussed the need for endocarditis prophylaxis.   Increase atorva to whole all days

## 2019-03-27 DIAGNOSIS — M47.816 FACET HYPERTROPHY OF LUMBAR REGION: Primary | ICD-10-CM

## 2019-03-29 ENCOUNTER — HOSPITAL ENCOUNTER (OUTPATIENT)
Dept: RADIOLOGY | Facility: HOSPITAL | Age: 69
Discharge: HOME | End: 2019-03-29
Attending: ANESTHESIOLOGY
Payer: MEDICARE

## 2019-03-29 DIAGNOSIS — M47.896 OTHER OSTEOARTHRITIS OF SPINE, LUMBAR REGION: Primary | ICD-10-CM

## 2019-03-29 DIAGNOSIS — M47.896 OTHER OSTEOARTHRITIS OF SPINE, LUMBAR REGION: ICD-10-CM

## 2019-03-29 PROCEDURE — 72110 XR LUMBAR SPINE COMPLETE 5 VIEW: ICD-10-PCS | Mod: 26,,, | Performed by: RADIOLOGY

## 2019-03-29 PROCEDURE — 72110 X-RAY EXAM L-2 SPINE 4/>VWS: CPT | Mod: TC,FY

## 2019-03-29 PROCEDURE — 72110 X-RAY EXAM L-2 SPINE 4/>VWS: CPT | Mod: 26,,, | Performed by: RADIOLOGY

## 2019-05-27 RX ORDER — ATORVASTATIN CALCIUM 40 MG/1
TABLET, FILM COATED ORAL
Qty: 90 TABLET | Refills: 3 | Status: SHIPPED | OUTPATIENT
Start: 2019-05-27 | End: 2020-06-22

## 2019-06-03 DIAGNOSIS — I25.10 CORONARY ARTERY DISEASE INVOLVING NATIVE CORONARY ARTERY OF NATIVE HEART WITHOUT ANGINA PECTORIS: ICD-10-CM

## 2019-06-03 DIAGNOSIS — I10 HTN (HYPERTENSION), BENIGN: ICD-10-CM

## 2019-06-03 RX ORDER — CARVEDILOL 12.5 MG/1
TABLET ORAL
Qty: 180 TABLET | Refills: 3 | Status: SHIPPED | OUTPATIENT
Start: 2019-06-03 | End: 2020-06-30

## 2019-12-05 ENCOUNTER — CLINICAL SUPPORT (OUTPATIENT)
Dept: CARDIOLOGY | Facility: CLINIC | Age: 69
End: 2019-12-05
Attending: INTERNAL MEDICINE
Payer: MEDICARE

## 2019-12-05 ENCOUNTER — LAB VISIT (OUTPATIENT)
Dept: LAB | Facility: HOSPITAL | Age: 69
End: 2019-12-05
Attending: INTERNAL MEDICINE
Payer: MEDICARE

## 2019-12-05 VITALS — HEIGHT: 68 IN | BODY MASS INDEX: 31.37 KG/M2 | WEIGHT: 207 LBS

## 2019-12-05 DIAGNOSIS — R73.01 IMPAIRED FASTING BLOOD SUGAR: ICD-10-CM

## 2019-12-05 DIAGNOSIS — E78.5 DYSLIPIDEMIA: ICD-10-CM

## 2019-12-05 DIAGNOSIS — I25.10 CORONARY ARTERY DISEASE INVOLVING NATIVE CORONARY ARTERY OF NATIVE HEART WITHOUT ANGINA PECTORIS: ICD-10-CM

## 2019-12-05 DIAGNOSIS — E66.3 OVERWEIGHT (BMI 25.0-29.9): ICD-10-CM

## 2019-12-05 DIAGNOSIS — I10 HTN (HYPERTENSION), BENIGN: ICD-10-CM

## 2019-12-05 DIAGNOSIS — Z95.2 S/P AVR (AORTIC VALVE REPLACEMENT): ICD-10-CM

## 2019-12-05 DIAGNOSIS — Z95.1 S/P CABG X 1: ICD-10-CM

## 2019-12-05 LAB
ASCENDING AORTA: 3.6 CM
AV INDEX (PROSTH): 0.45
AV MEAN GRADIENT: 11 MMHG
AV PEAK GRADIENT: 19 MMHG
AV VALVE AREA: 1.98 CM2
AV VELOCITY RATIO: 0.41
BSA FOR ECHO PROCEDURE: 2.12 M2
COMPLEXED PSA SERPL-MCNC: 0.48 NG/ML (ref 0–4)
CV ECHO LV RWT: 0.41 CM
CV STRESS BASE HR: 55 BPM
DIASTOLIC BLOOD PRESSURE: 83 MMHG
DOP CALC AO PEAK VEL: 2.18 M/S
DOP CALC AO VTI: 50.1 CM
DOP CALC LVOT AREA: 4.4 CM2
DOP CALC LVOT DIAMETER: 2.38 CM
DOP CALC LVOT PEAK VEL: 0.9 M/S
DOP CALC LVOT STROKE VOLUME: 99.2 CM3
DOP CALCLVOT PEAK VEL VTI: 22.31 CM
E WAVE DECELERATION TIME: 283.5 MSEC
E/A RATIO: 0.59
E/E' RATIO: 7.43 M/S
ECHO LV POSTERIOR WALL: 0.93 CM (ref 0.6–1.1)
FRACTIONAL SHORTENING: 30 % (ref 28–44)
INTERVENTRICULAR SEPTUM: 1.04 CM (ref 0.6–1.1)
IVRT: 0.12 MSEC
LA MAJOR: 5.83 CM
LA MINOR: 5.56 CM
LA WIDTH: 3.92 CM
LEFT ATRIUM SIZE: 3.54 CM
LEFT ATRIUM VOLUME INDEX: 32.4 ML/M2
LEFT ATRIUM VOLUME: 67.14 CM3
LEFT INTERNAL DIMENSION IN SYSTOLE: 3.17 CM (ref 2.1–4)
LEFT VENTRICLE DIASTOLIC VOLUME INDEX: 45.01 ML/M2
LEFT VENTRICLE DIASTOLIC VOLUME: 93.36 ML
LEFT VENTRICLE MASS INDEX: 73 G/M2
LEFT VENTRICLE SYSTOLIC VOLUME INDEX: 19.2 ML/M2
LEFT VENTRICLE SYSTOLIC VOLUME: 39.89 ML
LEFT VENTRICULAR INTERNAL DIMENSION IN DIASTOLE: 4.52 CM (ref 3.5–6)
LEFT VENTRICULAR MASS: 151.2 G
LV LATERAL E/E' RATIO: 5.78 M/S
LV SEPTAL E/E' RATIO: 10.4 M/S
MV PEAK A VEL: 0.88 M/S
MV PEAK E VEL: 0.52 M/S
OHS CV CPX 1 MINUTE RECOVERY HEART RATE: 104 BPM
OHS CV CPX 85 PERCENT MAX PREDICTED HEART RATE MALE: 128
OHS CV CPX ESTIMATED METS: 11
OHS CV CPX MAX PREDICTED HEART RATE: 150
OHS CV CPX PATIENT IS FEMALE: 0
OHS CV CPX PATIENT IS MALE: 1
OHS CV CPX PEAK DIASTOLIC BLOOD PRESSURE: 94 MMHG
OHS CV CPX PEAK HEAR RATE: 134 BPM
OHS CV CPX PEAK RATE PRESSURE PRODUCT: NORMAL
OHS CV CPX PEAK SYSTOLIC BLOOD PRESSURE: 174 MMHG
OHS CV CPX PERCENT MAX PREDICTED HEART RATE ACHIEVED: 89
OHS CV CPX RATE PRESSURE PRODUCT PRESENTING: 6325
PISA TR MAX VEL: 2.3 M/S
PULM VEIN S/D RATIO: 1.21
PV PEAK D VEL: 0.33 M/S
PV PEAK S VEL: 0.4 M/S
RA MAJOR: 5.3 CM
RA PRESSURE: 3 MMHG
RA WIDTH: 3.57 CM
RIGHT VENTRICULAR END-DIASTOLIC DIMENSION: 3.32 CM
SINUS: 3.7 CM
STJ: 3.28 CM
STRESS ECHO POST EXERCISE DUR MIN: 6 MINUTES
STRESS ECHO POST EXERCISE DUR SEC: 49 SECONDS
SYSTOLIC BLOOD PRESSURE: 115 MMHG
TDI LATERAL: 0.09 M/S
TDI SEPTAL: 0.05 M/S
TDI: 0.07 M/S
TR MAX PG: 21 MMHG
TRICUSPID ANNULAR PLANE SYSTOLIC EXCURSION: 1.73 CM
TSH SERPL DL<=0.005 MIU/L-ACNC: 2.68 UIU/ML (ref 0.4–4)
TV REST PULMONARY ARTERY PRESSURE: 24 MMHG

## 2019-12-05 PROCEDURE — 84443 ASSAY THYROID STIM HORMONE: CPT

## 2019-12-05 PROCEDURE — 93351 STRESS TTE COMPLETE: CPT | Mod: S$GLB,,, | Performed by: INTERNAL MEDICINE

## 2019-12-05 PROCEDURE — 99999 PR PBB SHADOW E&M-EST. PATIENT-LVL I: CPT | Mod: PBBFAC,,,

## 2019-12-05 PROCEDURE — 99999 PR PBB SHADOW E&M-EST. PATIENT-LVL I: ICD-10-PCS | Mod: PBBFAC,,,

## 2019-12-05 PROCEDURE — 84153 ASSAY OF PSA TOTAL: CPT

## 2019-12-05 PROCEDURE — 93351 ECHOCARDIOGRAM STRESS TEST WITH COLOR FLOW DOPPLER: ICD-10-PCS | Mod: S$GLB,,, | Performed by: INTERNAL MEDICINE

## 2019-12-05 PROCEDURE — 36415 COLL VENOUS BLD VENIPUNCTURE: CPT | Mod: PO

## 2019-12-09 DIAGNOSIS — I25.10 CORONARY ARTERY DISEASE, ANGINA PRESENCE UNSPECIFIED, UNSPECIFIED VESSEL OR LESION TYPE, UNSPECIFIED WHETHER NATIVE OR TRANSPLANTED HEART: Primary | ICD-10-CM

## 2019-12-09 DIAGNOSIS — R73.01 IMPAIRED FASTING GLUCOSE: ICD-10-CM

## 2019-12-09 DIAGNOSIS — E78.5 HYPERLIPIDEMIA, UNSPECIFIED HYPERLIPIDEMIA TYPE: ICD-10-CM

## 2019-12-10 ENCOUNTER — OFFICE VISIT (OUTPATIENT)
Dept: CARDIOLOGY | Facility: CLINIC | Age: 69
End: 2019-12-10
Payer: MEDICARE

## 2019-12-10 ENCOUNTER — LAB VISIT (OUTPATIENT)
Dept: LAB | Facility: HOSPITAL | Age: 69
End: 2019-12-10
Attending: INTERNAL MEDICINE
Payer: MEDICARE

## 2019-12-10 VITALS
BODY MASS INDEX: 30.01 KG/M2 | HEIGHT: 69 IN | WEIGHT: 202.63 LBS | HEART RATE: 60 BPM | DIASTOLIC BLOOD PRESSURE: 83 MMHG | SYSTOLIC BLOOD PRESSURE: 131 MMHG

## 2019-12-10 DIAGNOSIS — Z95.2 S/P AVR (AORTIC VALVE REPLACEMENT): ICD-10-CM

## 2019-12-10 DIAGNOSIS — R73.01 IMPAIRED FASTING BLOOD SUGAR: ICD-10-CM

## 2019-12-10 DIAGNOSIS — E78.5 DYSLIPIDEMIA: ICD-10-CM

## 2019-12-10 DIAGNOSIS — I10 HTN (HYPERTENSION), BENIGN: ICD-10-CM

## 2019-12-10 DIAGNOSIS — R73.01 IMPAIRED FASTING GLUCOSE: ICD-10-CM

## 2019-12-10 DIAGNOSIS — E66.3 OVERWEIGHT (BMI 25.0-29.9): ICD-10-CM

## 2019-12-10 DIAGNOSIS — I25.10 CORONARY ARTERY DISEASE INVOLVING NATIVE CORONARY ARTERY OF NATIVE HEART WITHOUT ANGINA PECTORIS: Primary | ICD-10-CM

## 2019-12-10 DIAGNOSIS — Z95.1 S/P CABG X 1: ICD-10-CM

## 2019-12-10 DIAGNOSIS — E11.9 DIABETES MELLITUS WITHOUT COMPLICATION: ICD-10-CM

## 2019-12-10 DIAGNOSIS — I25.10 CORONARY ARTERY DISEASE, ANGINA PRESENCE UNSPECIFIED, UNSPECIFIED VESSEL OR LESION TYPE, UNSPECIFIED WHETHER NATIVE OR TRANSPLANTED HEART: ICD-10-CM

## 2019-12-10 DIAGNOSIS — E78.5 HYPERLIPIDEMIA, UNSPECIFIED HYPERLIPIDEMIA TYPE: ICD-10-CM

## 2019-12-10 LAB
ALBUMIN SERPL BCP-MCNC: 3.7 G/DL (ref 3.5–5.2)
ALP SERPL-CCNC: 58 U/L (ref 55–135)
ALT SERPL W/O P-5'-P-CCNC: 29 U/L (ref 10–44)
ANION GAP SERPL CALC-SCNC: 6 MMOL/L (ref 8–16)
AST SERPL-CCNC: 19 U/L (ref 10–40)
BILIRUB SERPL-MCNC: 0.6 MG/DL (ref 0.1–1)
BUN SERPL-MCNC: 15 MG/DL (ref 8–23)
CALCIUM SERPL-MCNC: 8.9 MG/DL (ref 8.7–10.5)
CHLORIDE SERPL-SCNC: 108 MMOL/L (ref 95–110)
CHOLEST SERPL-MCNC: 119 MG/DL (ref 120–199)
CHOLEST/HDLC SERPL: 3.1 {RATIO} (ref 2–5)
CO2 SERPL-SCNC: 27 MMOL/L (ref 23–29)
CREAT SERPL-MCNC: 1.1 MG/DL (ref 0.5–1.4)
EST. GFR  (AFRICAN AMERICAN): >60 ML/MIN/1.73 M^2
EST. GFR  (NON AFRICAN AMERICAN): >60 ML/MIN/1.73 M^2
ESTIMATED AVG GLUCOSE: 146 MG/DL (ref 68–131)
GLUCOSE SERPL-MCNC: 142 MG/DL (ref 70–110)
HBA1C MFR BLD HPLC: 6.7 % (ref 4–5.6)
HDLC SERPL-MCNC: 38 MG/DL (ref 40–75)
HDLC SERPL: 31.9 % (ref 20–50)
LDLC SERPL CALC-MCNC: 64.6 MG/DL (ref 63–159)
NONHDLC SERPL-MCNC: 81 MG/DL
POTASSIUM SERPL-SCNC: 4.3 MMOL/L (ref 3.5–5.1)
PROT SERPL-MCNC: 6.5 G/DL (ref 6–8.4)
SODIUM SERPL-SCNC: 141 MMOL/L (ref 136–145)
TRIGL SERPL-MCNC: 82 MG/DL (ref 30–150)

## 2019-12-10 PROCEDURE — 36415 COLL VENOUS BLD VENIPUNCTURE: CPT

## 2019-12-10 PROCEDURE — 3075F PR MOST RECENT SYSTOLIC BLOOD PRESS GE 130-139MM HG: ICD-10-PCS | Mod: CPTII,S$GLB,, | Performed by: INTERNAL MEDICINE

## 2019-12-10 PROCEDURE — 3079F PR MOST RECENT DIASTOLIC BLOOD PRESSURE 80-89 MM HG: ICD-10-PCS | Mod: CPTII,S$GLB,, | Performed by: INTERNAL MEDICINE

## 2019-12-10 PROCEDURE — 99215 OFFICE O/P EST HI 40 MIN: CPT | Mod: S$GLB,,, | Performed by: INTERNAL MEDICINE

## 2019-12-10 PROCEDURE — 99999 PR PBB SHADOW E&M-EST. PATIENT-LVL IV: CPT | Mod: PBBFAC,,, | Performed by: INTERNAL MEDICINE

## 2019-12-10 PROCEDURE — 83036 HEMOGLOBIN GLYCOSYLATED A1C: CPT

## 2019-12-10 PROCEDURE — 80053 COMPREHEN METABOLIC PANEL: CPT

## 2019-12-10 PROCEDURE — 1159F PR MEDICATION LIST DOCUMENTED IN MEDICAL RECORD: ICD-10-PCS | Mod: S$GLB,,, | Performed by: INTERNAL MEDICINE

## 2019-12-10 PROCEDURE — 1126F AMNT PAIN NOTED NONE PRSNT: CPT | Mod: S$GLB,,, | Performed by: INTERNAL MEDICINE

## 2019-12-10 PROCEDURE — 99215 PR OFFICE/OUTPT VISIT, EST, LEVL V, 40-54 MIN: ICD-10-PCS | Mod: S$GLB,,, | Performed by: INTERNAL MEDICINE

## 2019-12-10 PROCEDURE — 99999 PR PBB SHADOW E&M-EST. PATIENT-LVL IV: ICD-10-PCS | Mod: PBBFAC,,, | Performed by: INTERNAL MEDICINE

## 2019-12-10 PROCEDURE — 3075F SYST BP GE 130 - 139MM HG: CPT | Mod: CPTII,S$GLB,, | Performed by: INTERNAL MEDICINE

## 2019-12-10 PROCEDURE — 1159F MED LIST DOCD IN RCRD: CPT | Mod: S$GLB,,, | Performed by: INTERNAL MEDICINE

## 2019-12-10 PROCEDURE — 3044F PR MOST RECENT HEMOGLOBIN A1C LEVEL <7.0%: ICD-10-PCS | Mod: CPTII,S$GLB,, | Performed by: INTERNAL MEDICINE

## 2019-12-10 PROCEDURE — 1126F PR PAIN SEVERITY QUANTIFIED, NO PAIN PRESENT: ICD-10-PCS | Mod: S$GLB,,, | Performed by: INTERNAL MEDICINE

## 2019-12-10 PROCEDURE — 80061 LIPID PANEL: CPT

## 2019-12-10 PROCEDURE — 3044F HG A1C LEVEL LT 7.0%: CPT | Mod: CPTII,S$GLB,, | Performed by: INTERNAL MEDICINE

## 2019-12-10 PROCEDURE — 3079F DIAST BP 80-89 MM HG: CPT | Mod: CPTII,S$GLB,, | Performed by: INTERNAL MEDICINE

## 2019-12-10 RX ORDER — AMOXICILLIN 500 MG/1
500 TABLET, FILM COATED ORAL EVERY 12 HOURS
Qty: 12 TABLET | Refills: 3 | Status: SHIPPED | OUTPATIENT
Start: 2019-12-10 | End: 2019-12-20

## 2019-12-10 RX ORDER — FLUTICASONE PROPIONATE 50 MCG
1 SPRAY, SUSPENSION (ML) NASAL DAILY PRN
COMMUNITY

## 2019-12-10 RX ORDER — CETIRIZINE HYDROCHLORIDE 10 MG/1
10 TABLET ORAL DAILY PRN
COMMUNITY

## 2019-12-10 RX ORDER — AMOXICILLIN 500 MG
CAPSULE ORAL DAILY
COMMUNITY

## 2019-12-10 RX ORDER — ALPRAZOLAM 0.5 MG/1
0.5 TABLET ORAL 2 TIMES DAILY PRN
COMMUNITY

## 2019-12-10 NOTE — PATIENT INSTRUCTIONS
Discussed diet , achieving and maintaining ideal body weight, and exercise.   We reviewed meds in detail.  Reassured-Discussed goals, options, plan.  We have discussed the need for endocarditis prophylaxis.

## 2020-09-16 ENCOUNTER — TELEPHONE (OUTPATIENT)
Dept: CARDIOLOGY | Facility: CLINIC | Age: 70
End: 2020-09-16

## 2020-09-16 RX ORDER — ATORVASTATIN CALCIUM 80 MG/1
80 TABLET, FILM COATED ORAL DAILY
Qty: 90 TABLET | Refills: 3 | Status: SHIPPED | OUTPATIENT
Start: 2020-09-16 | End: 2021-09-17

## 2020-09-16 RX ORDER — ATORVASTATIN CALCIUM 80 MG/1
80 TABLET, FILM COATED ORAL DAILY
COMMUNITY
End: 2020-09-16

## 2020-09-16 NOTE — TELEPHONE ENCOUNTER
"Received outside labs done at [facility] recently. Dr Villalba reviewed them - Total chol =139, TR=331, HDL=41, LDL=77.  Dr Villalba said " LDL not as good as he would like". Would like pt to double Atorvastatin from 40 to 80. Pt agreed to do so. New Rx fot Atorvastatin 80 mg  sent to [facility] .     "

## 2021-01-22 ENCOUNTER — PATIENT MESSAGE (OUTPATIENT)
Dept: ADMINISTRATIVE | Facility: OTHER | Age: 71
End: 2021-01-22

## 2021-01-25 NOTE — TELEPHONE ENCOUNTER
----- Message from Joo Villalba MD sent at 7/5/2017  7:41 PM CDT -----  Contact: patient called   I found the note from cardiology which said that there was transient low BP but someone was concerned about some rhythm disorder but that Dr Aguero's and fellows note said that there was no evidence of bundle branch block or rhythm disorder, so not sure what they saw or someone thought that they saw but cardiology note indicated no reason for concern,CJL  ----- Message -----  From: Shonna Sinha MA  Sent: 7/5/2017   5:53 PM  To: Joo Villalba MD    She said she saw Dr Aguero and one of the fellows.  But that's all I know.  ----- Message -----  From: Joo Villalba MD  Sent: 7/5/2017   5:28 PM  To: Shonna Sinha MA    Do they know any details as I looked in their notes and did not see anything mentioned?? Did they get cardiology to see him-if not, they must not have been too worried about it ( maybe just skipped beats??) CJL  ----- Message -----  From: Shonna Sinha MA  Sent: 7/5/2017   5:25 PM  To: Joo Villalba MD    FYI patient had an arrhythmia during surgery they just wanted to let you know.  ----- Message -----  From: Martine Zavala MA  Sent: 7/3/2017   9:57 AM  To: [employer] [facility] Staff     Please call the patient wife at [phone] she need to talk to you about his surgery he  had on 6- . Thank you.           Yes

## 2021-02-11 ENCOUNTER — IMMUNIZATION (OUTPATIENT)
Dept: PHARMACY | Facility: CLINIC | Age: 71
End: 2021-02-11
Payer: MEDICARE

## 2021-02-11 DIAGNOSIS — Z23 NEED FOR VACCINATION: Primary | ICD-10-CM

## 2021-03-04 DIAGNOSIS — Z12.5 PROSTATE CANCER SCREENING: Primary | ICD-10-CM

## 2021-03-11 ENCOUNTER — IMMUNIZATION (OUTPATIENT)
Dept: PHARMACY | Facility: CLINIC | Age: 71
End: 2021-03-11
Payer: MEDICARE

## 2021-03-11 DIAGNOSIS — Z23 NEED FOR VACCINATION: Primary | ICD-10-CM

## 2021-04-20 ENCOUNTER — PATIENT MESSAGE (OUTPATIENT)
Dept: CARDIOLOGY | Facility: CLINIC | Age: 71
End: 2021-04-20

## 2021-04-20 ENCOUNTER — LAB VISIT (OUTPATIENT)
Dept: LAB | Facility: HOSPITAL | Age: 71
End: 2021-04-20
Attending: INTERNAL MEDICINE
Payer: MEDICARE

## 2021-04-20 DIAGNOSIS — I25.10 CORONARY ARTERY DISEASE INVOLVING NATIVE CORONARY ARTERY OF NATIVE HEART WITHOUT ANGINA PECTORIS: ICD-10-CM

## 2021-04-20 DIAGNOSIS — I10 HTN (HYPERTENSION), BENIGN: ICD-10-CM

## 2021-04-20 DIAGNOSIS — Z95.1 S/P CABG X 1: ICD-10-CM

## 2021-04-20 DIAGNOSIS — E11.9 DIABETES MELLITUS WITHOUT COMPLICATION: ICD-10-CM

## 2021-04-20 DIAGNOSIS — R73.01 IMPAIRED FASTING BLOOD SUGAR: ICD-10-CM

## 2021-04-20 DIAGNOSIS — E78.5 DYSLIPIDEMIA: ICD-10-CM

## 2021-04-20 DIAGNOSIS — E66.3 OVERWEIGHT (BMI 25.0-29.9): ICD-10-CM

## 2021-04-20 LAB
ALBUMIN SERPL BCP-MCNC: 3.9 G/DL (ref 3.5–5.2)
ALP SERPL-CCNC: 62 U/L (ref 55–135)
ALT SERPL W/O P-5'-P-CCNC: 44 U/L (ref 10–44)
ANION GAP SERPL CALC-SCNC: 4 MMOL/L (ref 8–16)
AST SERPL-CCNC: 27 U/L (ref 10–40)
BILIRUB SERPL-MCNC: 0.6 MG/DL (ref 0.1–1)
BUN SERPL-MCNC: 15 MG/DL (ref 8–23)
CALCIUM SERPL-MCNC: 8.9 MG/DL (ref 8.7–10.5)
CHLORIDE SERPL-SCNC: 105 MMOL/L (ref 95–110)
CHOLEST SERPL-MCNC: 122 MG/DL (ref 120–199)
CHOLEST/HDLC SERPL: 3.5 {RATIO} (ref 2–5)
CO2 SERPL-SCNC: 32 MMOL/L (ref 23–29)
CREAT SERPL-MCNC: 1.2 MG/DL (ref 0.5–1.4)
EST. GFR  (AFRICAN AMERICAN): >60 ML/MIN/1.73 M^2
EST. GFR  (NON AFRICAN AMERICAN): >60 ML/MIN/1.73 M^2
ESTIMATED AVG GLUCOSE: 163 MG/DL (ref 68–131)
GLUCOSE SERPL-MCNC: 145 MG/DL (ref 70–110)
HBA1C MFR BLD: 7.3 % (ref 4–5.6)
HDLC SERPL-MCNC: 35 MG/DL (ref 40–75)
HDLC SERPL: 28.7 % (ref 20–50)
LDLC SERPL CALC-MCNC: 65.4 MG/DL (ref 63–159)
NONHDLC SERPL-MCNC: 87 MG/DL
POTASSIUM SERPL-SCNC: 4.2 MMOL/L (ref 3.5–5.1)
PROT SERPL-MCNC: 7 G/DL (ref 6–8.4)
SODIUM SERPL-SCNC: 141 MMOL/L (ref 136–145)
TRIGL SERPL-MCNC: 108 MG/DL (ref 30–150)
TSH SERPL DL<=0.005 MIU/L-ACNC: 3.35 UIU/ML (ref 0.4–4)

## 2021-04-20 PROCEDURE — 80053 COMPREHEN METABOLIC PANEL: CPT | Performed by: INTERNAL MEDICINE

## 2021-04-20 PROCEDURE — 84443 ASSAY THYROID STIM HORMONE: CPT | Performed by: INTERNAL MEDICINE

## 2021-04-20 PROCEDURE — 36415 COLL VENOUS BLD VENIPUNCTURE: CPT | Mod: PO | Performed by: INTERNAL MEDICINE

## 2021-04-20 PROCEDURE — 80061 LIPID PANEL: CPT | Performed by: INTERNAL MEDICINE

## 2021-04-20 PROCEDURE — 83036 HEMOGLOBIN GLYCOSYLATED A1C: CPT | Performed by: INTERNAL MEDICINE

## 2021-04-26 ENCOUNTER — OFFICE VISIT (OUTPATIENT)
Dept: CARDIOLOGY | Facility: CLINIC | Age: 71
End: 2021-04-26
Payer: MEDICARE

## 2021-04-26 VITALS
SYSTOLIC BLOOD PRESSURE: 118 MMHG | OXYGEN SATURATION: 99 % | BODY MASS INDEX: 29.33 KG/M2 | HEIGHT: 69 IN | WEIGHT: 198 LBS | DIASTOLIC BLOOD PRESSURE: 76 MMHG | HEART RATE: 84 BPM

## 2021-04-26 DIAGNOSIS — Z95.2 S/P AVR (AORTIC VALVE REPLACEMENT): ICD-10-CM

## 2021-04-26 DIAGNOSIS — I10 HTN (HYPERTENSION), BENIGN: ICD-10-CM

## 2021-04-26 DIAGNOSIS — Z95.1 S/P CABG X 1: ICD-10-CM

## 2021-04-26 DIAGNOSIS — E11.9 DIABETES MELLITUS WITHOUT COMPLICATION: ICD-10-CM

## 2021-04-26 DIAGNOSIS — I25.10 CORONARY ARTERY DISEASE INVOLVING NATIVE CORONARY ARTERY OF NATIVE HEART WITHOUT ANGINA PECTORIS: Primary | ICD-10-CM

## 2021-04-26 DIAGNOSIS — E66.3 OVERWEIGHT (BMI 25.0-29.9): ICD-10-CM

## 2021-04-26 DIAGNOSIS — E78.5 DYSLIPIDEMIA: ICD-10-CM

## 2021-04-26 PROCEDURE — 1159F MED LIST DOCD IN RCRD: CPT | Mod: S$GLB,,, | Performed by: INTERNAL MEDICINE

## 2021-04-26 PROCEDURE — 3008F PR BODY MASS INDEX (BMI) DOCUMENTED: ICD-10-PCS | Mod: CPTII,S$GLB,, | Performed by: INTERNAL MEDICINE

## 2021-04-26 PROCEDURE — 99999 PR PBB SHADOW E&M-EST. PATIENT-LVL IV: ICD-10-PCS | Mod: PBBFAC,,, | Performed by: INTERNAL MEDICINE

## 2021-04-26 PROCEDURE — 3008F BODY MASS INDEX DOCD: CPT | Mod: CPTII,S$GLB,, | Performed by: INTERNAL MEDICINE

## 2021-04-26 PROCEDURE — 3051F PR MOST RECENT HEMOGLOBIN A1C LEVEL 7.0 - < 8.0%: ICD-10-PCS | Mod: CPTII,S$GLB,, | Performed by: INTERNAL MEDICINE

## 2021-04-26 PROCEDURE — 1159F PR MEDICATION LIST DOCUMENTED IN MEDICAL RECORD: ICD-10-PCS | Mod: S$GLB,,, | Performed by: INTERNAL MEDICINE

## 2021-04-26 PROCEDURE — 99215 OFFICE O/P EST HI 40 MIN: CPT | Mod: S$GLB,,, | Performed by: INTERNAL MEDICINE

## 2021-04-26 PROCEDURE — 99215 PR OFFICE/OUTPT VISIT, EST, LEVL V, 40-54 MIN: ICD-10-PCS | Mod: S$GLB,,, | Performed by: INTERNAL MEDICINE

## 2021-04-26 PROCEDURE — 3051F HG A1C>EQUAL 7.0%<8.0%: CPT | Mod: CPTII,S$GLB,, | Performed by: INTERNAL MEDICINE

## 2021-04-26 PROCEDURE — 99999 PR PBB SHADOW E&M-EST. PATIENT-LVL IV: CPT | Mod: PBBFAC,,, | Performed by: INTERNAL MEDICINE

## 2021-04-26 RX ORDER — NITROGLYCERIN 0.4 MG/1
0.4 TABLET SUBLINGUAL EVERY 5 MIN PRN
Qty: 25 TABLET | Refills: 3 | Status: SHIPPED | OUTPATIENT
Start: 2021-04-26 | End: 2022-06-15 | Stop reason: SDUPTHER

## 2021-04-26 RX ORDER — AMOXICILLIN 500 MG/1
500 TABLET, FILM COATED ORAL EVERY 12 HOURS
Qty: 12 TABLET | Refills: 3 | Status: SHIPPED | OUTPATIENT
Start: 2021-04-26 | End: 2021-04-26 | Stop reason: SDUPTHER

## 2021-04-26 RX ORDER — METFORMIN HYDROCHLORIDE 500 MG/1
500 TABLET ORAL 2 TIMES DAILY WITH MEALS
Qty: 180 TABLET | Refills: 3 | Status: SHIPPED | OUTPATIENT
Start: 2021-04-26 | End: 2022-04-26

## 2021-04-26 RX ORDER — AMOXICILLIN 500 MG/1
500 TABLET, FILM COATED ORAL EVERY 12 HOURS
Qty: 12 TABLET | Refills: 3 | Status: SHIPPED | OUTPATIENT
Start: 2021-04-26 | End: 2021-04-30 | Stop reason: SDUPTHER

## 2021-04-29 ENCOUNTER — PATIENT MESSAGE (OUTPATIENT)
Dept: CARDIOLOGY | Facility: CLINIC | Age: 71
End: 2021-04-29

## 2021-04-30 DIAGNOSIS — Z95.2 S/P AVR (AORTIC VALVE REPLACEMENT): ICD-10-CM

## 2021-04-30 RX ORDER — AMOXICILLIN 500 MG/1
500 TABLET, FILM COATED ORAL EVERY 12 HOURS
Qty: 12 TABLET | Refills: 3 | Status: SHIPPED | OUTPATIENT
Start: 2021-04-30 | End: 2021-05-10

## 2021-06-06 ENCOUNTER — PATIENT MESSAGE (OUTPATIENT)
Dept: CARDIOLOGY | Facility: CLINIC | Age: 71
End: 2021-06-06

## 2021-06-09 ENCOUNTER — OFFICE VISIT (OUTPATIENT)
Dept: ENDOCRINOLOGY | Facility: CLINIC | Age: 71
End: 2021-06-09
Payer: MEDICARE

## 2021-06-09 VITALS
RESPIRATION RATE: 16 BRPM | BODY MASS INDEX: 27.95 KG/M2 | HEART RATE: 68 BPM | WEIGHT: 188.69 LBS | DIASTOLIC BLOOD PRESSURE: 80 MMHG | SYSTOLIC BLOOD PRESSURE: 116 MMHG | HEIGHT: 69 IN

## 2021-06-09 DIAGNOSIS — I10 HTN (HYPERTENSION), BENIGN: ICD-10-CM

## 2021-06-09 DIAGNOSIS — I25.10 CORONARY ARTERY DISEASE INVOLVING NATIVE CORONARY ARTERY OF NATIVE HEART WITHOUT ANGINA PECTORIS: ICD-10-CM

## 2021-06-09 DIAGNOSIS — E78.5 DYSLIPIDEMIA: ICD-10-CM

## 2021-06-09 DIAGNOSIS — E66.3 OVERWEIGHT (BMI 25.0-29.9): ICD-10-CM

## 2021-06-09 DIAGNOSIS — E11.9 TYPE 2 DIABETES MELLITUS WITHOUT COMPLICATION, WITHOUT LONG-TERM CURRENT USE OF INSULIN: Primary | ICD-10-CM

## 2021-06-09 PROCEDURE — 3051F PR MOST RECENT HEMOGLOBIN A1C LEVEL 7.0 - < 8.0%: ICD-10-PCS | Mod: CPTII,S$GLB,, | Performed by: INTERNAL MEDICINE

## 2021-06-09 PROCEDURE — 99999 PR PBB SHADOW E&M-EST. PATIENT-LVL IV: ICD-10-PCS | Mod: PBBFAC,,, | Performed by: INTERNAL MEDICINE

## 2021-06-09 PROCEDURE — 99999 PR PBB SHADOW E&M-EST. PATIENT-LVL IV: CPT | Mod: PBBFAC,,, | Performed by: INTERNAL MEDICINE

## 2021-06-09 PROCEDURE — 1101F PT FALLS ASSESS-DOCD LE1/YR: CPT | Mod: CPTII,S$GLB,, | Performed by: INTERNAL MEDICINE

## 2021-06-09 PROCEDURE — 3074F SYST BP LT 130 MM HG: CPT | Mod: CPTII,S$GLB,, | Performed by: INTERNAL MEDICINE

## 2021-06-09 PROCEDURE — 1159F PR MEDICATION LIST DOCUMENTED IN MEDICAL RECORD: ICD-10-PCS | Mod: S$GLB,,, | Performed by: INTERNAL MEDICINE

## 2021-06-09 PROCEDURE — 3288F PR FALLS RISK ASSESSMENT DOCUMENTED: ICD-10-PCS | Mod: CPTII,S$GLB,, | Performed by: INTERNAL MEDICINE

## 2021-06-09 PROCEDURE — 1126F AMNT PAIN NOTED NONE PRSNT: CPT | Mod: S$GLB,,, | Performed by: INTERNAL MEDICINE

## 2021-06-09 PROCEDURE — 3051F HG A1C>EQUAL 7.0%<8.0%: CPT | Mod: CPTII,S$GLB,, | Performed by: INTERNAL MEDICINE

## 2021-06-09 PROCEDURE — 3079F DIAST BP 80-89 MM HG: CPT | Mod: CPTII,S$GLB,, | Performed by: INTERNAL MEDICINE

## 2021-06-09 PROCEDURE — 99204 OFFICE O/P NEW MOD 45 MIN: CPT | Mod: S$GLB,,, | Performed by: INTERNAL MEDICINE

## 2021-06-09 PROCEDURE — 3008F BODY MASS INDEX DOCD: CPT | Mod: CPTII,S$GLB,, | Performed by: INTERNAL MEDICINE

## 2021-06-09 PROCEDURE — 99204 PR OFFICE/OUTPT VISIT, NEW, LEVL IV, 45-59 MIN: ICD-10-PCS | Mod: S$GLB,,, | Performed by: INTERNAL MEDICINE

## 2021-06-09 PROCEDURE — 1126F PR PAIN SEVERITY QUANTIFIED, NO PAIN PRESENT: ICD-10-PCS | Mod: S$GLB,,, | Performed by: INTERNAL MEDICINE

## 2021-06-09 PROCEDURE — 3074F PR MOST RECENT SYSTOLIC BLOOD PRESSURE < 130 MM HG: ICD-10-PCS | Mod: CPTII,S$GLB,, | Performed by: INTERNAL MEDICINE

## 2021-06-09 PROCEDURE — 3288F FALL RISK ASSESSMENT DOCD: CPT | Mod: CPTII,S$GLB,, | Performed by: INTERNAL MEDICINE

## 2021-06-09 PROCEDURE — 3008F PR BODY MASS INDEX (BMI) DOCUMENTED: ICD-10-PCS | Mod: CPTII,S$GLB,, | Performed by: INTERNAL MEDICINE

## 2021-06-09 PROCEDURE — 1159F MED LIST DOCD IN RCRD: CPT | Mod: S$GLB,,, | Performed by: INTERNAL MEDICINE

## 2021-06-09 PROCEDURE — 3079F PR MOST RECENT DIASTOLIC BLOOD PRESSURE 80-89 MM HG: ICD-10-PCS | Mod: CPTII,S$GLB,, | Performed by: INTERNAL MEDICINE

## 2021-06-09 PROCEDURE — 1101F PR PT FALLS ASSESS DOC 0-1 FALLS W/OUT INJ PAST YR: ICD-10-PCS | Mod: CPTII,S$GLB,, | Performed by: INTERNAL MEDICINE

## 2021-06-09 RX ORDER — DAPAGLIFLOZIN 10 MG/1
10 TABLET, FILM COATED ORAL DAILY
Qty: 30 TABLET | Refills: 11 | Status: SHIPPED | OUTPATIENT
Start: 2021-06-09 | End: 2023-12-08 | Stop reason: ALTCHOICE

## 2021-06-10 ENCOUNTER — PATIENT MESSAGE (OUTPATIENT)
Dept: ENDOCRINOLOGY | Facility: CLINIC | Age: 71
End: 2021-06-10

## 2021-06-15 ENCOUNTER — CLINICAL SUPPORT (OUTPATIENT)
Dept: DIABETES | Facility: CLINIC | Age: 71
End: 2021-06-15
Payer: MEDICARE

## 2021-06-15 DIAGNOSIS — E11.9 TYPE 2 DIABETES MELLITUS WITHOUT COMPLICATION, WITHOUT LONG-TERM CURRENT USE OF INSULIN: ICD-10-CM

## 2021-06-15 PROCEDURE — G0108 PR DIAB MANAGE TRN  PER INDIV: ICD-10-PCS | Mod: S$GLB,,, | Performed by: INTERNAL MEDICINE

## 2021-06-15 PROCEDURE — 99999 PR PBB SHADOW E&M-EST. PATIENT-LVL I: CPT | Mod: PBBFAC,,,

## 2021-06-15 PROCEDURE — G0108 DIAB MANAGE TRN  PER INDIV: HCPCS | Mod: S$GLB,,, | Performed by: INTERNAL MEDICINE

## 2021-06-15 PROCEDURE — 99999 PR PBB SHADOW E&M-EST. PATIENT-LVL I: ICD-10-PCS | Mod: PBBFAC,,,

## 2021-07-26 ENCOUNTER — LAB VISIT (OUTPATIENT)
Dept: LAB | Facility: HOSPITAL | Age: 71
End: 2021-07-26
Attending: INTERNAL MEDICINE
Payer: MEDICARE

## 2021-07-26 DIAGNOSIS — I10 HTN (HYPERTENSION), BENIGN: ICD-10-CM

## 2021-07-26 DIAGNOSIS — E11.9 DIABETES MELLITUS WITHOUT COMPLICATION: ICD-10-CM

## 2021-07-26 LAB
ANION GAP SERPL CALC-SCNC: 8 MMOL/L (ref 8–16)
BUN SERPL-MCNC: 19 MG/DL (ref 8–23)
CALCIUM SERPL-MCNC: 9.7 MG/DL (ref 8.7–10.5)
CHLORIDE SERPL-SCNC: 106 MMOL/L (ref 95–110)
CO2 SERPL-SCNC: 26 MMOL/L (ref 23–29)
CREAT SERPL-MCNC: 1 MG/DL (ref 0.5–1.4)
EST. GFR  (AFRICAN AMERICAN): >60 ML/MIN/1.73 M^2
EST. GFR  (NON AFRICAN AMERICAN): >60 ML/MIN/1.73 M^2
ESTIMATED AVG GLUCOSE: 117 MG/DL (ref 68–131)
GLUCOSE SERPL-MCNC: 97 MG/DL (ref 70–110)
HBA1C MFR BLD: 5.7 % (ref 4–5.6)
POTASSIUM SERPL-SCNC: 4.2 MMOL/L (ref 3.5–5.1)
SODIUM SERPL-SCNC: 140 MMOL/L (ref 136–145)

## 2021-07-26 PROCEDURE — 83036 HEMOGLOBIN GLYCOSYLATED A1C: CPT | Performed by: INTERNAL MEDICINE

## 2021-07-26 PROCEDURE — 36415 COLL VENOUS BLD VENIPUNCTURE: CPT | Mod: PO | Performed by: INTERNAL MEDICINE

## 2021-07-26 PROCEDURE — 80048 BASIC METABOLIC PNL TOTAL CA: CPT | Performed by: INTERNAL MEDICINE

## 2021-07-27 ENCOUNTER — PATIENT MESSAGE (OUTPATIENT)
Dept: CARDIOLOGY | Facility: CLINIC | Age: 71
End: 2021-07-27

## 2021-09-13 ENCOUNTER — CLINICAL SUPPORT (OUTPATIENT)
Dept: URGENT CARE | Facility: CLINIC | Age: 71
End: 2021-09-13
Payer: MEDICARE

## 2021-09-13 ENCOUNTER — TELEPHONE (OUTPATIENT)
Dept: URGENT CARE | Facility: CLINIC | Age: 71
End: 2021-09-13

## 2021-09-13 DIAGNOSIS — U07.1 COVID-19: Primary | ICD-10-CM

## 2021-09-13 DIAGNOSIS — Z11.59 ENCOUNTER FOR SCREENING FOR OTHER VIRAL DISEASES: Primary | ICD-10-CM

## 2021-09-13 DIAGNOSIS — U07.1 COVID-19 VIRUS DETECTED: ICD-10-CM

## 2021-09-13 LAB
CTP QC/QA: YES
SARS-COV-2 RDRP RESP QL NAA+PROBE: POSITIVE

## 2021-09-13 PROCEDURE — U0002 COVID-19 LAB TEST NON-CDC: HCPCS | Mod: QW,S$GLB,, | Performed by: NURSE PRACTITIONER

## 2021-09-13 PROCEDURE — 99211 OFF/OP EST MAY X REQ PHY/QHP: CPT | Mod: S$GLB,CS,, | Performed by: NURSE PRACTITIONER

## 2021-09-13 PROCEDURE — U0002: ICD-10-PCS | Mod: QW,S$GLB,, | Performed by: NURSE PRACTITIONER

## 2021-09-13 PROCEDURE — 99211 PR OFFICE/OUTPT VISIT, EST, LEVL I: ICD-10-PCS | Mod: S$GLB,CS,, | Performed by: NURSE PRACTITIONER

## 2021-09-15 ENCOUNTER — INFUSION (OUTPATIENT)
Dept: INFECTIOUS DISEASES | Facility: HOSPITAL | Age: 71
End: 2021-09-15
Attending: INTERNAL MEDICINE
Payer: MEDICARE

## 2021-09-15 VITALS
TEMPERATURE: 99 F | WEIGHT: 180 LBS | DIASTOLIC BLOOD PRESSURE: 70 MMHG | HEART RATE: 63 BPM | RESPIRATION RATE: 14 BRPM | BODY MASS INDEX: 27.28 KG/M2 | HEIGHT: 68 IN | SYSTOLIC BLOOD PRESSURE: 119 MMHG | OXYGEN SATURATION: 97 %

## 2021-09-15 DIAGNOSIS — U07.1 COVID-19: Primary | ICD-10-CM

## 2021-09-15 PROCEDURE — 25000003 PHARM REV CODE 250: Performed by: INTERNAL MEDICINE

## 2021-09-15 PROCEDURE — 63600175 PHARM REV CODE 636 W HCPCS: Performed by: INTERNAL MEDICINE

## 2021-09-15 PROCEDURE — M0243 CASIRIVI AND IMDEVI INFUSION: HCPCS | Performed by: INTERNAL MEDICINE

## 2021-09-15 RX ORDER — ACETAMINOPHEN 325 MG/1
650 TABLET ORAL ONCE AS NEEDED
Status: ACTIVE | OUTPATIENT
Start: 2021-09-15 | End: 2033-02-11

## 2021-09-15 RX ORDER — EPINEPHRINE 0.3 MG/.3ML
0.3 INJECTION SUBCUTANEOUS
Status: ACTIVE | OUTPATIENT
Start: 2021-09-15

## 2021-09-15 RX ORDER — ALBUTEROL SULFATE 90 UG/1
2 AEROSOL, METERED RESPIRATORY (INHALATION)
Status: ACTIVE | OUTPATIENT
Start: 2021-09-15

## 2021-09-15 RX ORDER — DIPHENHYDRAMINE HYDROCHLORIDE 50 MG/ML
25 INJECTION INTRAMUSCULAR; INTRAVENOUS ONCE AS NEEDED
Status: ACTIVE | OUTPATIENT
Start: 2021-09-15 | End: 2033-02-11

## 2021-09-15 RX ORDER — ONDANSETRON 4 MG/1
4 TABLET, ORALLY DISINTEGRATING ORAL ONCE AS NEEDED
Status: ACTIVE | OUTPATIENT
Start: 2021-09-15 | End: 2033-02-11

## 2021-09-15 RX ORDER — SODIUM CHLORIDE 0.9 % (FLUSH) 0.9 %
10 SYRINGE (ML) INJECTION
Status: ACTIVE | OUTPATIENT
Start: 2021-09-15

## 2021-09-15 RX ADMIN — CASIRIVIMAB AND IMDEVIMAB 600 MG: 600; 600 INJECTION, SOLUTION, CONCENTRATE INTRAVENOUS at 11:09

## 2022-05-13 ENCOUNTER — PATIENT MESSAGE (OUTPATIENT)
Dept: CARDIOLOGY | Facility: CLINIC | Age: 72
End: 2022-05-13
Payer: MEDICARE

## 2022-05-16 ENCOUNTER — PATIENT MESSAGE (OUTPATIENT)
Dept: CARDIOLOGY | Facility: CLINIC | Age: 72
End: 2022-05-16
Payer: MEDICARE

## 2022-06-08 ENCOUNTER — PATIENT MESSAGE (OUTPATIENT)
Dept: CARDIOLOGY | Facility: CLINIC | Age: 72
End: 2022-06-08
Payer: MEDICARE

## 2022-06-08 ENCOUNTER — HOSPITAL ENCOUNTER (OUTPATIENT)
Dept: CARDIOLOGY | Facility: HOSPITAL | Age: 72
Discharge: HOME | End: 2022-06-08
Attending: INTERNAL MEDICINE
Payer: MEDICARE

## 2022-06-08 VITALS — HEIGHT: 68 IN | BODY MASS INDEX: 27.28 KG/M2 | WEIGHT: 180 LBS

## 2022-06-08 DIAGNOSIS — E11.9 DIABETES MELLITUS WITHOUT COMPLICATION: ICD-10-CM

## 2022-06-08 DIAGNOSIS — I25.10 CORONARY ARTERY DISEASE INVOLVING NATIVE CORONARY ARTERY OF NATIVE HEART WITHOUT ANGINA PECTORIS: ICD-10-CM

## 2022-06-08 DIAGNOSIS — Z95.1 S/P CABG X 1: ICD-10-CM

## 2022-06-08 DIAGNOSIS — Z95.2 S/P AVR (AORTIC VALVE REPLACEMENT): ICD-10-CM

## 2022-06-08 DIAGNOSIS — E78.5 DYSLIPIDEMIA: ICD-10-CM

## 2022-06-08 DIAGNOSIS — I10 HTN (HYPERTENSION), BENIGN: ICD-10-CM

## 2022-06-08 LAB
ASCENDING AORTA: 3.39 CM
AV INDEX (PROSTH): 0.57
AV MEAN GRADIENT: 7 MMHG
AV PEAK GRADIENT: 14 MMHG
AV VALVE AREA: 2.02 CM2
AV VELOCITY RATIO: 0.55
BSA FOR ECHO PROCEDURE: 1.98 M2
CV ECHO LV RWT: 0.51 CM
CV STRESS BASE HR: 54 BPM
DIASTOLIC BLOOD PRESSURE: 73 MMHG
DOP CALC AO PEAK VEL: 1.84 M/S
DOP CALC AO VTI: 36.33 CM
DOP CALC LVOT AREA: 3.6 CM2
DOP CALC LVOT DIAMETER: 2.13 CM
DOP CALC LVOT PEAK VEL: 1.01 M/S
DOP CALC LVOT STROKE VOLUME: 73.4 CM3
DOP CALCLVOT PEAK VEL VTI: 20.61 CM
E WAVE DECELERATION TIME: 284.35 MSEC
E/A RATIO: 0.83
E/E' RATIO: 7.07 M/S
ECHO LV POSTERIOR WALL: 1.07 CM (ref 0.6–1.1)
EJECTION FRACTION: 63 %
FRACTIONAL SHORTENING: 28 % (ref 28–44)
INTERVENTRICULAR SEPTUM: 1.11 CM (ref 0.6–1.1)
IVRT: 105.61 MSEC
LA MAJOR: 5.47 CM
LA MINOR: 5.64 CM
LA WIDTH: 3.38 CM
LEFT ATRIUM SIZE: 4.17 CM
LEFT ATRIUM VOLUME INDEX: 34.1 ML/M2
LEFT ATRIUM VOLUME: 66.54 CM3
LEFT INTERNAL DIMENSION IN SYSTOLE: 3 CM (ref 2.1–4)
LEFT VENTRICLE DIASTOLIC VOLUME INDEX: 40.13 ML/M2
LEFT VENTRICLE DIASTOLIC VOLUME: 78.25 ML
LEFT VENTRICLE MASS INDEX: 79 G/M2
LEFT VENTRICLE SYSTOLIC VOLUME INDEX: 18 ML/M2
LEFT VENTRICLE SYSTOLIC VOLUME: 35.13 ML
LEFT VENTRICULAR INTERNAL DIMENSION IN DIASTOLE: 4.19 CM (ref 3.5–6)
LEFT VENTRICULAR MASS: 154.45 G
LV LATERAL E/E' RATIO: 5.89 M/S
LV SEPTAL E/E' RATIO: 8.83 M/S
MV A" WAVE DURATION": 11.7 MSEC
MV PEAK A VEL: 0.64 M/S
MV PEAK E VEL: 0.53 M/S
MV STENOSIS PRESSURE HALF TIME: 82.46 MS
MV VALVE AREA P 1/2 METHOD: 2.67 CM2
OHS CV CPX 1 MINUTE RECOVERY HEART RATE: 98 BPM
OHS CV CPX 85 PERCENT MAX PREDICTED HEART RATE MALE: 126
OHS CV CPX ESTIMATED METS: 12
OHS CV CPX MAX PREDICTED HEART RATE: 148
OHS CV CPX PATIENT IS FEMALE: 0
OHS CV CPX PATIENT IS MALE: 1
OHS CV CPX PEAK DIASTOLIC BLOOD PRESSURE: 79 MMHG
OHS CV CPX PEAK HEAR RATE: 126 BPM
OHS CV CPX PEAK RATE PRESSURE PRODUCT: ABNORMAL
OHS CV CPX PEAK SYSTOLIC BLOOD PRESSURE: 145 MMHG
OHS CV CPX PERCENT MAX PREDICTED HEART RATE ACHIEVED: 85
OHS CV CPX RATE PRESSURE PRODUCT PRESENTING: 5670
PISA MRMAX VEL: 0.05 M/S
PISA TR MAX VEL: 2.68 M/S
PULM VEIN S/D RATIO: 0.75
PV PEAK D VEL: 0.61 M/S
PV PEAK S VEL: 0.46 M/S
RA MAJOR: 5.19 CM
RA PRESSURE: 3 MMHG
RA WIDTH: 3.17 CM
RIGHT VENTRICULAR END-DIASTOLIC DIMENSION: 4.53 CM
RV TISSUE DOPPLER FREE WALL SYSTOLIC VELOCITY 1 (APICAL 4 CHAMBER VIEW): 9.86 CM/S
SINUS: 3.75 CM
STJ: 3.23 CM
STRESS ECHO POST EXERCISE DUR MIN: 7 MINUTES
STRESS ECHO POST EXERCISE DUR SEC: 1 SECONDS
SYSTOLIC BLOOD PRESSURE: 105 MMHG
TDI LATERAL: 0.09 M/S
TDI SEPTAL: 0.06 M/S
TDI: 0.08 M/S
TR MAX PG: 29 MMHG
TRICUSPID ANNULAR PLANE SYSTOLIC EXCURSION: 2.11 CM
TV REST PULMONARY ARTERY PRESSURE: 32 MMHG

## 2022-06-08 PROCEDURE — 93325 STRESS ECHO (CUPID ONLY): ICD-10-PCS | Mod: 26,,, | Performed by: INTERNAL MEDICINE

## 2022-06-08 PROCEDURE — 93320 DOPPLER ECHO COMPLETE: CPT | Mod: 26,,, | Performed by: INTERNAL MEDICINE

## 2022-06-08 PROCEDURE — 93320 STRESS ECHO (CUPID ONLY): ICD-10-PCS | Mod: 26,,, | Performed by: INTERNAL MEDICINE

## 2022-06-08 PROCEDURE — 93351 STRESS TTE COMPLETE: CPT | Mod: 26,,, | Performed by: INTERNAL MEDICINE

## 2022-06-08 PROCEDURE — 93325 DOPPLER ECHO COLOR FLOW MAPG: CPT | Mod: 26,,, | Performed by: INTERNAL MEDICINE

## 2022-06-08 PROCEDURE — 93325 DOPPLER ECHO COLOR FLOW MAPG: CPT

## 2022-06-08 PROCEDURE — 93351 STRESS ECHO (CUPID ONLY): ICD-10-PCS | Mod: 26,,, | Performed by: INTERNAL MEDICINE

## 2022-06-15 ENCOUNTER — OFFICE VISIT (OUTPATIENT)
Dept: CARDIOLOGY | Facility: CLINIC | Age: 72
End: 2022-06-15
Payer: MEDICARE

## 2022-06-15 VITALS
HEIGHT: 68 IN | DIASTOLIC BLOOD PRESSURE: 85 MMHG | HEART RATE: 59 BPM | BODY MASS INDEX: 29.07 KG/M2 | WEIGHT: 191.81 LBS | SYSTOLIC BLOOD PRESSURE: 135 MMHG

## 2022-06-15 DIAGNOSIS — E66.3 OVERWEIGHT (BMI 25.0-29.9): ICD-10-CM

## 2022-06-15 DIAGNOSIS — I25.10 CORONARY ARTERY DISEASE INVOLVING NATIVE CORONARY ARTERY OF NATIVE HEART WITHOUT ANGINA PECTORIS: Primary | ICD-10-CM

## 2022-06-15 DIAGNOSIS — E78.5 DYSLIPIDEMIA: ICD-10-CM

## 2022-06-15 DIAGNOSIS — Z95.1 S/P CABG X 1: ICD-10-CM

## 2022-06-15 DIAGNOSIS — E11.9 TYPE 2 DIABETES MELLITUS WITHOUT COMPLICATION, WITHOUT LONG-TERM CURRENT USE OF INSULIN: ICD-10-CM

## 2022-06-15 DIAGNOSIS — I10 HTN (HYPERTENSION), BENIGN: ICD-10-CM

## 2022-06-15 DIAGNOSIS — Z95.2 S/P AVR (AORTIC VALVE REPLACEMENT): ICD-10-CM

## 2022-06-15 PROCEDURE — 1101F PT FALLS ASSESS-DOCD LE1/YR: CPT | Mod: CPTII,S$GLB,, | Performed by: INTERNAL MEDICINE

## 2022-06-15 PROCEDURE — 99215 PR OFFICE/OUTPT VISIT, EST, LEVL V, 40-54 MIN: ICD-10-PCS | Mod: S$GLB,,, | Performed by: INTERNAL MEDICINE

## 2022-06-15 PROCEDURE — 99999 PR PBB SHADOW E&M-EST. PATIENT-LVL V: ICD-10-PCS | Mod: PBBFAC,,, | Performed by: INTERNAL MEDICINE

## 2022-06-15 PROCEDURE — 99999 PR PBB SHADOW E&M-EST. PATIENT-LVL V: CPT | Mod: PBBFAC,,, | Performed by: INTERNAL MEDICINE

## 2022-06-15 PROCEDURE — 3008F PR BODY MASS INDEX (BMI) DOCUMENTED: ICD-10-PCS | Mod: CPTII,S$GLB,, | Performed by: INTERNAL MEDICINE

## 2022-06-15 PROCEDURE — 1101F PR PT FALLS ASSESS DOC 0-1 FALLS W/OUT INJ PAST YR: ICD-10-PCS | Mod: CPTII,S$GLB,, | Performed by: INTERNAL MEDICINE

## 2022-06-15 PROCEDURE — 3079F PR MOST RECENT DIASTOLIC BLOOD PRESSURE 80-89 MM HG: ICD-10-PCS | Mod: CPTII,S$GLB,, | Performed by: INTERNAL MEDICINE

## 2022-06-15 PROCEDURE — 3288F PR FALLS RISK ASSESSMENT DOCUMENTED: ICD-10-PCS | Mod: CPTII,S$GLB,, | Performed by: INTERNAL MEDICINE

## 2022-06-15 PROCEDURE — 1159F MED LIST DOCD IN RCRD: CPT | Mod: CPTII,S$GLB,, | Performed by: INTERNAL MEDICINE

## 2022-06-15 PROCEDURE — 3075F PR MOST RECENT SYSTOLIC BLOOD PRESS GE 130-139MM HG: ICD-10-PCS | Mod: CPTII,S$GLB,, | Performed by: INTERNAL MEDICINE

## 2022-06-15 PROCEDURE — 3079F DIAST BP 80-89 MM HG: CPT | Mod: CPTII,S$GLB,, | Performed by: INTERNAL MEDICINE

## 2022-06-15 PROCEDURE — 1159F PR MEDICATION LIST DOCUMENTED IN MEDICAL RECORD: ICD-10-PCS | Mod: CPTII,S$GLB,, | Performed by: INTERNAL MEDICINE

## 2022-06-15 PROCEDURE — 3075F SYST BP GE 130 - 139MM HG: CPT | Mod: CPTII,S$GLB,, | Performed by: INTERNAL MEDICINE

## 2022-06-15 PROCEDURE — 3008F BODY MASS INDEX DOCD: CPT | Mod: CPTII,S$GLB,, | Performed by: INTERNAL MEDICINE

## 2022-06-15 PROCEDURE — 3288F FALL RISK ASSESSMENT DOCD: CPT | Mod: CPTII,S$GLB,, | Performed by: INTERNAL MEDICINE

## 2022-06-15 PROCEDURE — 1126F PR PAIN SEVERITY QUANTIFIED, NO PAIN PRESENT: ICD-10-PCS | Mod: CPTII,S$GLB,, | Performed by: INTERNAL MEDICINE

## 2022-06-15 PROCEDURE — 1126F AMNT PAIN NOTED NONE PRSNT: CPT | Mod: CPTII,S$GLB,, | Performed by: INTERNAL MEDICINE

## 2022-06-15 PROCEDURE — 99215 OFFICE O/P EST HI 40 MIN: CPT | Mod: S$GLB,,, | Performed by: INTERNAL MEDICINE

## 2022-06-15 RX ORDER — NITROGLYCERIN 0.4 MG/1
0.4 TABLET SUBLINGUAL EVERY 5 MIN PRN
Qty: 25 TABLET | Refills: 3 | Status: SHIPPED | OUTPATIENT
Start: 2022-06-15 | End: 2023-10-16

## 2022-06-15 RX ORDER — AMOXICILLIN 500 MG/1
500 TABLET, FILM COATED ORAL
Qty: 12 TABLET | Refills: 3 | Status: SHIPPED | OUTPATIENT
Start: 2022-06-15 | End: 2022-06-21 | Stop reason: SDUPTHER

## 2022-06-15 RX ORDER — ASPIRIN 81 MG/1
81 TABLET ORAL DAILY
Qty: 30 TABLET | Refills: 0
Start: 2022-06-15 | End: 2022-07-15

## 2022-06-15 RX ORDER — EZETIMIBE 10 MG/1
10 TABLET ORAL DAILY
Qty: 30 TABLET | Refills: 0 | Status: SHIPPED | OUTPATIENT
Start: 2022-06-15 | End: 2022-07-23

## 2022-06-15 NOTE — PROGRESS NOTES
Subjective:   Patient ID:  Bryant King is a 71 y.o. male who presents for follow-up of CVD    HPI: Patient is here for valvular heart disease.The patient is here for CAD.  The patient has no chest pain, SOB, TIA, palpitations, syncope or pre-syncope.Patient currently exercises a few times per week.        Review of Systems   Constitutional: Negative for chills, decreased appetite, diaphoresis, fever, malaise/fatigue, night sweats, weight gain and weight loss.   HENT: Negative for congestion, hoarse voice, nosebleeds, sore throat and tinnitus.    Eyes: Negative for blurred vision, double vision, vision loss in left eye, vision loss in right eye, visual disturbance and visual halos.   Cardiovascular: Negative for chest pain, claudication, cyanosis, dyspnea on exertion, irregular heartbeat, leg swelling, near-syncope, orthopnea, palpitations, paroxysmal nocturnal dyspnea and syncope.   Respiratory: Negative for cough, hemoptysis, shortness of breath, sleep disturbances due to breathing, snoring, sputum production and wheezing.    Endocrine: Negative for cold intolerance, heat intolerance, polydipsia, polyphagia and polyuria.   Hematologic/Lymphatic: Negative for adenopathy and bleeding problem. Does not bruise/bleed easily.   Skin: Negative for color change, dry skin, flushing, itching, nail changes, poor wound healing, rash, skin cancer, suspicious lesions and unusual hair distribution.   Musculoskeletal: Negative for arthritis, back pain, falls, gout, joint pain, joint swelling, muscle cramps, muscle weakness, myalgias and stiffness.   Gastrointestinal: Negative for abdominal pain, anorexia, change in bowel habit, constipation, diarrhea, dysphagia, heartburn, hematemesis, hematochezia, melena and vomiting.   Genitourinary: Negative for decreased libido, dysuria, hematuria, hesitancy and urgency.   Neurological: Negative for excessive daytime sleepiness, dizziness, focal weakness, headaches, light-headedness,  "loss of balance, numbness, paresthesias, seizures, sensory change, tremors, vertigo and weakness.   Psychiatric/Behavioral: Negative for altered mental status, depression, hallucinations, memory loss, substance abuse and suicidal ideas. The patient does not have insomnia and is not nervous/anxious.    Allergic/Immunologic: Negative for environmental allergies and hives.       Objective: /85 (BP Location: Left arm, Patient Position: Sitting, BP Method: Medium (Automatic))   Pulse (!) 59   Ht 5' 8" (1.727 m)   Wt 87 kg (191 lb 12.8 oz)   BMI 29.16 kg/m²      Physical Exam  Constitutional:       General: He is not in acute distress.     Appearance: He is well-developed. He is not diaphoretic.   HENT:      Head: Normocephalic.   Eyes:      Pupils: Pupils are equal, round, and reactive to light.   Neck:      Thyroid: No thyromegaly.   Cardiovascular:      Rate and Rhythm: Normal rate and regular rhythm.      Pulses: Intact distal pulses.           Carotid pulses are 3+ on the right side and 3+ on the left side.       Radial pulses are 3+ on the right side and 3+ on the left side.        Femoral pulses are 3+ on the right side and 3+ on the left side.       Popliteal pulses are 3+ on the right side and 3+ on the left side.        Dorsalis pedis pulses are 3+ on the right side and 3+ on the left side.        Posterior tibial pulses are 3+ on the right side and 3+ on the left side.      Heart sounds: Normal heart sounds. No murmur heard.    No friction rub. No gallop.   Pulmonary:      Effort: Pulmonary effort is normal. No respiratory distress.      Breath sounds: Normal breath sounds. No wheezing or rales.   Chest:      Chest wall: No tenderness.   Abdominal:      General: There is no distension.      Palpations: Abdomen is soft. There is no mass.      Tenderness: There is no abdominal tenderness.   Musculoskeletal:         General: Normal range of motion.      Cervical back: Normal range of motion. "   Lymphadenopathy:      Cervical: No cervical adenopathy.   Skin:     General: Skin is warm.      Nails: There is no clubbing.   Neurological:      Mental Status: He is alert and oriented to person, place, and time.   Psychiatric:         Speech: Speech normal.         Behavior: Behavior normal.         Thought Content: Thought content normal.         Judgment: Judgment normal.       4/22 labs Glu 129 , TG 89 , HDL 43, LDL 77 A1C 6.4  Assessment:     1. Coronary artery disease involving native coronary artery of native heart without angina pectoris    2. S/P CABG x 1    3. S/P AVR (aortic valve replacement)    4. Type 2 diabetes mellitus without complication, without long-term current use of insulin    5. Overweight (BMI 25.0-29.9)    6. Dyslipidemia    7. HTN (hypertension), benign        Plan:   Discussed diet , achieving and maintaining ideal body weight, and exercise.   We reviewed meds in detail.  Reassured-Discussed goals, options, plan.  Omega-3 > 800 mg/d combined EPA/DHA.  Goal BP< 130/80.  Goal LDL < 70.  We have discussed the need for endocarditis prophylaxis.       NTG should be refilled every 8-9 months.  Could either add half of Ezetimide or switch atorva to rosuva   Reduce ASA to baby 81    Bryant was seen today for annual exam and results.    Diagnoses and all orders for this visit:    Coronary artery disease involving native coronary artery of native heart without angina pectoris  -     Lipid Panel; Standing  -     Comprehensive Metabolic Panel; Standing  -     TSH; Future; Expected date: 08/15/2023  -     EKG 12-lead; Future; Expected date: 08/15/2023  -     aspirin (ECOTRIN) 81 MG EC tablet; Take 1 tablet (81 mg total) by mouth once daily.  -     BNP; Future; Expected date: 08/15/2023  -     ezetimibe (ZETIA) 10 mg tablet; Take 1 tablet (10 mg total) by mouth once daily.  -     nitroGLYCERIN (NITROSTAT) 0.4 MG SL tablet; Place 1 tablet (0.4 mg total) under the tongue every 5 (five) minutes as  needed for Chest pain.    S/P CABG x 1  -     EKG 12-lead; Future; Expected date: 08/15/2023  -     BNP; Future; Expected date: 08/15/2023  -     nitroGLYCERIN (NITROSTAT) 0.4 MG SL tablet; Place 1 tablet (0.4 mg total) under the tongue every 5 (five) minutes as needed for Chest pain.    S/P AVR (aortic valve replacement)  -     EKG 12-lead; Future; Expected date: 08/15/2023  -     aspirin (ECOTRIN) 81 MG EC tablet; Take 1 tablet (81 mg total) by mouth once daily.  -     BNP; Future; Expected date: 08/15/2023  -     amoxicillin (AMOXIL) 500 MG Tab; Take 1 tablet (500 mg total) by mouth as needed.    Type 2 diabetes mellitus without complication, without long-term current use of insulin  -     Comprehensive Metabolic Panel; Standing  -     Hemoglobin A1C; Standing    Overweight (BMI 25.0-29.9)  -     TSH; Future; Expected date: 08/15/2023    Dyslipidemia  -     Lipid Panel; Standing  -     Comprehensive Metabolic Panel; Standing  -     TSH; Future; Expected date: 08/15/2023  -     ezetimibe (ZETIA) 10 mg tablet; Take 1 tablet (10 mg total) by mouth once daily.    HTN (hypertension), benign  -     Comprehensive Metabolic Panel; Standing  -     TSH; Future; Expected date: 08/15/2023  -     EKG 12-lead; Future; Expected date: 08/15/2023            Follow up in about 15 months (around 9/15/2023) for with ECG and labs ; labs 6 months.

## 2022-06-15 NOTE — PATIENT INSTRUCTIONS
Discussed diet , achieving and maintaining ideal body weight, and exercise.   We reviewed meds in detail.  Reassured-Discussed goals, options, plan.  Omega-3 > 800 mg/d combined EPA/DHA.  Goal BP< 130/80.  Goal LDL < 70.  We have discussed the need for endocarditis prophylaxis.   Could either add half of Ezetimide or switch atorva to rosuva   Reduce ASA to baby 81

## 2022-06-21 DIAGNOSIS — Z95.2 S/P AVR (AORTIC VALVE REPLACEMENT): ICD-10-CM

## 2022-06-21 RX ORDER — AMOXICILLIN 500 MG/1
2000 TABLET, FILM COATED ORAL
Qty: 12 TABLET | Refills: 3 | Status: SHIPPED | OUTPATIENT
Start: 2022-06-21 | End: 2022-07-01

## 2022-12-15 ENCOUNTER — LAB VISIT (OUTPATIENT)
Dept: LAB | Facility: HOSPITAL | Age: 72
End: 2022-12-15
Attending: INTERNAL MEDICINE
Payer: MEDICARE

## 2022-12-15 DIAGNOSIS — E66.3 OVERWEIGHT (BMI 25.0-29.9): ICD-10-CM

## 2022-12-15 DIAGNOSIS — I25.10 CORONARY ARTERY DISEASE INVOLVING NATIVE CORONARY ARTERY OF NATIVE HEART WITHOUT ANGINA PECTORIS: ICD-10-CM

## 2022-12-15 DIAGNOSIS — Z95.1 S/P CABG X 1: ICD-10-CM

## 2022-12-15 DIAGNOSIS — I10 HTN (HYPERTENSION), BENIGN: ICD-10-CM

## 2022-12-15 DIAGNOSIS — Z95.2 S/P AVR (AORTIC VALVE REPLACEMENT): ICD-10-CM

## 2022-12-15 DIAGNOSIS — E11.9 TYPE 2 DIABETES MELLITUS WITHOUT COMPLICATION, WITHOUT LONG-TERM CURRENT USE OF INSULIN: ICD-10-CM

## 2022-12-15 DIAGNOSIS — E78.5 DYSLIPIDEMIA: ICD-10-CM

## 2022-12-15 LAB
ALBUMIN SERPL BCP-MCNC: 4.1 G/DL (ref 3.5–5.2)
ALP SERPL-CCNC: 65 U/L (ref 55–135)
ALT SERPL W/O P-5'-P-CCNC: 40 U/L (ref 10–44)
ANION GAP SERPL CALC-SCNC: 9 MMOL/L (ref 8–16)
AST SERPL-CCNC: 25 U/L (ref 10–40)
BILIRUB SERPL-MCNC: 1 MG/DL (ref 0.1–1)
BNP SERPL-MCNC: 16 PG/ML (ref 0–99)
BUN SERPL-MCNC: 10 MG/DL (ref 8–23)
CALCIUM SERPL-MCNC: 9.5 MG/DL (ref 8.7–10.5)
CHLORIDE SERPL-SCNC: 103 MMOL/L (ref 95–110)
CHOLEST SERPL-MCNC: 98 MG/DL (ref 120–199)
CHOLEST/HDLC SERPL: 2.5 {RATIO} (ref 2–5)
CO2 SERPL-SCNC: 26 MMOL/L (ref 23–29)
CREAT SERPL-MCNC: 0.9 MG/DL (ref 0.5–1.4)
EST. GFR  (NO RACE VARIABLE): >60 ML/MIN/1.73 M^2
ESTIMATED AVG GLUCOSE: 166 MG/DL (ref 68–131)
GLUCOSE SERPL-MCNC: 141 MG/DL (ref 70–110)
HBA1C MFR BLD: 7.4 % (ref 4–5.6)
HDLC SERPL-MCNC: 40 MG/DL (ref 40–75)
HDLC SERPL: 40.8 % (ref 20–50)
LDLC SERPL CALC-MCNC: 44.8 MG/DL (ref 63–159)
NONHDLC SERPL-MCNC: 58 MG/DL
POTASSIUM SERPL-SCNC: 4.3 MMOL/L (ref 3.5–5.1)
PROT SERPL-MCNC: 7.1 G/DL (ref 6–8.4)
SODIUM SERPL-SCNC: 138 MMOL/L (ref 136–145)
TRIGL SERPL-MCNC: 66 MG/DL (ref 30–150)
TSH SERPL DL<=0.005 MIU/L-ACNC: 2.94 UIU/ML (ref 0.4–4)

## 2022-12-15 PROCEDURE — 83036 HEMOGLOBIN GLYCOSYLATED A1C: CPT | Performed by: INTERNAL MEDICINE

## 2022-12-15 PROCEDURE — 80053 COMPREHEN METABOLIC PANEL: CPT | Performed by: INTERNAL MEDICINE

## 2022-12-15 PROCEDURE — 80061 LIPID PANEL: CPT | Performed by: INTERNAL MEDICINE

## 2022-12-15 PROCEDURE — 83880 ASSAY OF NATRIURETIC PEPTIDE: CPT | Performed by: INTERNAL MEDICINE

## 2022-12-15 PROCEDURE — 84443 ASSAY THYROID STIM HORMONE: CPT | Performed by: INTERNAL MEDICINE

## 2022-12-15 PROCEDURE — 36415 COLL VENOUS BLD VENIPUNCTURE: CPT | Mod: PO | Performed by: INTERNAL MEDICINE

## 2022-12-30 DIAGNOSIS — M47.816 OSTEOARTHRITIS OF LUMBAR SPINE, UNSPECIFIED SPINAL OSTEOARTHRITIS COMPLICATION STATUS: Primary | ICD-10-CM

## 2022-12-30 NOTE — PROGRESS NOTES
Pt with h/o lumbar spine fusion. Having difficulty with strength and gait. [facility] entered for [facility].    
Yes

## 2023-01-05 ENCOUNTER — CLINICAL SUPPORT (OUTPATIENT)
Dept: REHABILITATION | Facility: HOSPITAL | Age: 73
End: 2023-01-05
Attending: NURSE PRACTITIONER
Payer: MEDICARE

## 2023-01-05 DIAGNOSIS — M47.816 OSTEOARTHRITIS OF LUMBAR SPINE, UNSPECIFIED SPINAL OSTEOARTHRITIS COMPLICATION STATUS: ICD-10-CM

## 2023-01-05 DIAGNOSIS — R52 PAIN AGGRAVATED BY WALKING: ICD-10-CM

## 2023-01-05 DIAGNOSIS — M62.81 WEAKNESS OF TRUNK MUSCULATURE: ICD-10-CM

## 2023-01-05 DIAGNOSIS — M53.80 BACK TIGHTNESS: ICD-10-CM

## 2023-01-05 DIAGNOSIS — M25.69 BACK STIFFNESS: ICD-10-CM

## 2023-01-05 DIAGNOSIS — R25.9 DYSFUNCTIONAL MOVEMENTS: ICD-10-CM

## 2023-01-05 PROCEDURE — 97161 PT EVAL LOW COMPLEX 20 MIN: CPT | Mod: PO | Performed by: PHYSICAL THERAPIST

## 2023-01-05 NOTE — PROGRESS NOTES
[facility]   Physical Therapy Initial Evaluation     Date: 1/5/2023   Name: Bryant King  Clinic Number: [id number]    Therapy Diagnosis:   Encounter Diagnoses   Name Primary?    Osteoarthritis of lumbar spine, unspecified spinal osteoarthritis complication status     Back stiffness     Back tightness     Dysfunctional movements     Pain aggravated by walking     Weakness of trunk musculature      Physician: Carlie Alvarado NP    Physician Orders: PT Eval and Treat low back  Medical Diagnosis from Referral:   Osteoarthritis of lumbar spine, unspecified spinal osteoarthritis complication status [M47.816  Evaluation Date: 1/5/2023  Authorization Period Expiration: 12/30/2023  Plan of Care Expiration: 4/5/2023  Progress Note Due: 2/5/2023  Visit # / Visits authorized: 1/ 1   FOTO: 0/ 3     Precautions: Standard    Time In: 1150  Time Out: 1245  Total Appointment Time (timed & untimed codes): 55 minutes      SUBJECTIVE       History of current condition: Bryant reports constant pain low back with tightness as well as pain in the right gluteal region. He says that he notices the buttocks pain when walking.       Date of onset: long history of low back pain. Fusion of the low back in high school with discectomy at L4-L5. Exacerbation of back pain over the past few years. Started playing more golf and also with activity he notices pain in the Right buttocks. Also has had NATAN three years ago.   Falls: 0   Pain:  Current 5/10, worst 7/10, best 4/10   Location: right gluteal area and center of the low back.     Description: Tight  Aggravating Factors: Walking and Getting out of bed/chair  Easing Factors:  movement change of position and stretching   Sleep: not disturbed.     Current Level of Function:   Personal - no limitation   Domestic - no limitation   Community - no limitation   Occupation  - no limitation   Sports/recreation/fitness - no limitation   Prior Level of Function: walking more  prior to three years ago.   Prior Therapy: yes, 8 to 9 years ago.   Social History:   lives with their spouse lillie single family home. No stairs   Occupation:      Patients goals: normal lifestyle and functional   Imaging, X-Rays  3/29/2019    Slight convex right curvature lumbar spine there is grade 1 retrolisthesis of L3 on L4 and grade 1 anterolisthesis of L4 on L5.  No evidence for spondylolysis.  There is scattered endplate degeneration allowing for degenerative change the lumbar vertebral body heights and contours are within normal is without evidence for acute fracture.  Tortuous atherosclerotic aorta.  Further evaluation as warranted clinically..      Medical History:   Past Medical History:   Diagnosis Date    Coronary artery disease     Hyperlipidemia     Hypertension     Migraines     Type 2 diabetes mellitus without complication, without long-term current use of insulin        Surgical History:   Bryant King  has a past surgical history that includes Coronary artery bypass graft (02/08/2012); Aortic valve replacement; Hernia repair; back fusion; Patella fracture surgery; Tonsillectomy; and laparoscopic hiatal hernia repair with mesh.    Medications:   Bryant has a current medication list which includes the following prescription(s): alprazolam, aspirin, atorvastatin, carvedilol, cetirizine, farxiga, ezetimibe, fluticasone propionate, gabapentin, metformin, nitroglycerin, fish oil-omega-3 fatty acids, and rizatriptan, and the following Facility-Administered Medications: acetaminophen, albuterol, diphenhydramine, epinephrine, methylprednisolone sodium succinate, ondansetron, sodium chloride 0.9% 500 mL flush bag, and sodium chloride 0.9%.    Allergies:   Review of patient's allergies indicates:  No Known Allergies       OBJECTIVE       Posture Alignment: decreased lumbar lordosis with a left lateral shift.   Sensation: Light Touch: Intact  Palpation: unremarkable for elicited  pain in the lumbo sacral region bilaterally. Lumbar PSM tightness identified bilaterally.       Lumbar/Thoracic AROM: Pain/Dysfunction with Movement:   Flexion                  90/60 = 30 DN   Extension              20/10 = 10 DN   Right side bending       30 NP   Left side bending          30 NP   Right rotation            50% DN   Left rotation               75% DN       LOWER EXTREMITY STRENGTH:   Left Right   Quadriceps 5/5 5/5   Hamstrings 5/5 5/5     EHL  3-/5 5/5   Anterior tibialis 4-/5 5/5   Peroneals   5/5 5/5         DTR's:   Left Right   Patella Tendon 2+ 2+   Achilles Tendon 1+ 1+           Selective Functional Movement Assessment:  FN: functional, non-painful  FP: functional, painful  DP: dysfunctional, painful  DN: dysfunctional, non-painful      Multi-Segmental Flexion:      see above   Multi-Segmental Extension: see above     Multi-Segmental Rotation:   right see above   left see above           FUNCTIONAL MOVEMENT BREAKOUTS:  Long sitting  = DN sacral angle 80  SLR   -Active   right FN 90  left DN   65    -Passive  right FN 90   left DN 70    Knees to chest   Active - DN       Press up  - DN   Prone Rocking - DN gluts not able to place on heels and prominent right lumbar PSM.   Lumbar exten / rot  Active  R- DN  L - DN  Passive  R - DN  L - DN    Lumbar locked rotation   Active  R - DN  L - DN  Passive  R - DN  L - DN    Hip extension   Active  R - FN  L - FN     GAIT: ambulates with no assistive device with independently.     GAIT DEVIATIONS: displays steady gait      Limitation/Restriction for FOTO lumbar spine IE Survey - not performed    Therapist reviewed FOTO scores for Bryant King on 1/5/2023.   FOTO documents entered into [facility] - see Media section.    Limitation Score: -%         TREATMENT     Total Treatment time (time-based codes) separate from Evaluation: 0 minutes          PATIENT EDUCATION AND HOME EXERCISES     Education provided:   - discussed stability vs mobility concepts as  it relates to his findings     Written Home Exercises Provided: no.     ASSESSMENT     Bryant is a 72 y.o. male referred to outpatient Physical Therapy with a medical diagnosis of Osteoarthritis of lumbar spine, unspecified spinal osteoarthritis complication status .  Patient presents with clinical findings of a hip flexion mobility dysfunction relative to gluteal and piriformis tightness with a spine flexion mobility dysfunction, a posterior chain mobility dysfunction specific to the LLE, a thoracic and a lumbar extension rotation mobility dysfunctions. He has segmental mobility limitations along with extra-articular tissue and muscular restrictions in the LLE, back extensors, QL. He does not exhibit pain with any of the movements assessed.     Patient prognosis is Good.   Patientt will benefit from skilled outpatient Physical Therapy to address the deficits stated above and in the chart below, provide patient /family education, and to maximize patientt's level of independence.     Plan of care discussed with patient: Yes  Patient's spiritual, cultural and educational needs considered and patient is agreeable to the plan of care and goals as stated below:     Anticipated Barriers for therapy: scheduling    Medical Necessity is demonstrated by the following  History  Co-morbidities and personal factors that may impact the plan of care Co-morbidities:   Past Medical History:   Diagnosis Date    Coronary artery disease     Hyperlipidemia     Hypertension     Migraines     Type 2 diabetes mellitus without complication, without long-term current use of insulin        Personal Factors:   no deficits     low   Examination  Body Structures and Functions, activity limitations and participation restrictions that may impact the plan of care Body Regions:   back  lower extremities  trunk    Body Systems:    musculoskeletal    Participation Restrictions:   None    Activity limitations:   Learning and applying knowledge  no  deficits    General Tasks and Commands  no deficits    Communication  no deficits    Mobility  walking  sitting    Self care  no deficits    Domestic Life  no deficits    Interactions/Relationships  no deficits    Life Areas  no deficits    Community and Social Life  community life  recreation and leisure         low   Clinical Presentation stable and uncomplicated low   Decision Making/ Complexity Score: low     Goals:    Goals to be met:    Short Term GOALS: 5 weeks. Pt agrees with goals set.  1. Pts pain intensity rising from sit to stand decreased 20-40% for improved quality of life and functional mobilty. ONGOING  2. Pt to demonstrate core activation with spinal stability during transitional movements for improved quality of movement. ONGOING  3. Patient demonstrates independence with HEP for self management . ONGOING  4. Patient demonstrates independence with Postural Awareness for control of pain.ONGOING  5. Pt demonstrates active SLR LLE  to 70 degrees to improve functional mobility. ONGOING  6. Pt to increase passive lumbar and thoracic extension rotation mobility to allow for increased active standing rotation by 25%. ONGOING     Long Term GOALS: 10 weeks. Pt agrees with goals set.  1. Patient demonstrates increased active thoracic extension rotation mobility to 30 to 50 degrees to improve functional mobility and tolerance to functional activities. ONGOING  2. Patient demonstrates increased BLE hip flexion mobility to 120 degrees  to improve functional mobility.  ONGOING  3. Patient demonstrates improved overall function and return demonstration to walk for 25 to 50% longer periods, rise from sitting and recreational lifestyle. ONGOING  4. Pt demonstrates [place] with body mechanics to control episodes of pain associated with daily ADL and improve functional lifestyle. ONGOING  5. Pts pain level decreased to 75% or greater with sit to stand and walking for restoring functional mobility and ADL.  ONGOING   PLAN   Plan of care Certification: 1/5/2023 to 4/5/2023.    Outpatient Physical Therapy 2 times weekly for 10 weeks to include the following interventions: Manual Therapy, Patient Education, and Therapeutic Exercise.     Nehemiah Alcaraz, PT      I CERTIFY THE NEED FOR THESE SERVICES FURNISHED UNDER THIS PLAN OF TREATMENT AND WHILE UNDER MY CARE   Physician's comments:     Physician's Signature: ___________________________________________________

## 2023-01-06 NOTE — PLAN OF CARE
[facility]   Physical Therapy Initial Evaluation     Date: 1/5/2023   Name: Bryant King  Clinic Number: [id number]    Therapy Diagnosis:   Encounter Diagnoses   Name Primary?    Osteoarthritis of lumbar spine, unspecified spinal osteoarthritis complication status     Back stiffness     Back tightness     Dysfunctional movements     Pain aggravated by walking     Weakness of trunk musculature      Physician: Carlie Alvarado NP    Physician Orders: PT Eval and Treat low back  Medical Diagnosis from Referral:   Osteoarthritis of lumbar spine, unspecified spinal osteoarthritis complication status [M47.816  Evaluation Date: 1/5/2023  Authorization Period Expiration: 12/30/2023  Plan of Care Expiration: 4/5/2023  Progress Note Due: 2/5/2023  Visit # / Visits authorized: 1/ 1   FOTO: 1245/ 3     Precautions: Standard    Time In: 1150  Time Out: 1245  Total Appointment Time (timed & untimed codes): 55 minutes      SUBJECTIVE       History of current condition: Bryant reports constant pain low back with tightness as well as pain in the right gluteal region. He says that he notices the buttocks pain when walking.       Date of onset: long history of low back pain. Fusion of the low back in high school with discectomy at L4-L5. Exacerbation of back pain over the past few years. Started playing more golf and also with activity he notices pain in the Right buttocks. Also has had NATAN three years ago.   Falls: 0   Pain:  Current 5/10, worst 7/10, best 4/10   Location: right gluteal area and center of the low back.     Description: Tight  Aggravating Factors: Walking and Getting out of bed/chair  Easing Factors: movement change of position and stretching   Sleep: not disturbed.     Current Level of Function:   Personal - no limitation   Domestic - no limitation   Community - no limitation   Occupation  - no limitation   Sports/recreation/fitness - no limitation   Prior Level of Function: walking  more prior to three years ago.   Prior Therapy: yes, 8 to 9 years ago.   Social History:   lives with their spouse lillie single family home. No stairs   Occupation:      Patients goals: normal lifestyle and functional   Imaging, X-Rays  3/29/2019    Slight convex right curvature lumbar spine there is grade 1 retrolisthesis of L3 on L4 and grade 1 anterolisthesis of L4 on L5.  No evidence for spondylolysis.  There is scattered endplate degeneration allowing for degenerative change the lumbar vertebral body heights and contours are within normal is without evidence for acute fracture.  Tortuous atherosclerotic aorta.  Further evaluation as warranted clinically..      Medical History:   Past Medical History:   Diagnosis Date    Coronary artery disease     Hyperlipidemia     Hypertension     Migraines     Type 2 diabetes mellitus without complication, without long-term current use of insulin        Surgical History:   Bryant King  has a past surgical history that includes Coronary artery bypass graft (02/08/2012); Aortic valve replacement; Hernia repair; back fusion; Patella fracture surgery; Tonsillectomy; and laparoscopic hiatal hernia repair with mesh.    Medications:   Bryant has a current medication list which includes the following prescription(s): alprazolam, aspirin, atorvastatin, carvedilol, cetirizine, farxiga, ezetimibe, fluticasone propionate, gabapentin, metformin, nitroglycerin, fish oil-omega-3 fatty acids, and rizatriptan, and the following Facility-Administered Medications: acetaminophen, albuterol, diphenhydramine, epinephrine, methylprednisolone sodium succinate, ondansetron, sodium chloride 0.9% 500 mL flush bag, and sodium chloride 0.9%.    Allergies:   Review of patient's allergies indicates:  No Known Allergies       OBJECTIVE       Posture Alignment: decreased lumbar lordosis with a left lateral shift.   Sensation: Light Touch: Intact  Palpation: unremarkable for  elicited pain in the lumbo sacral region bilaterally. Lumbar PSM tightness identified bilaterally.       Lumbar/Thoracic AROM: Pain/Dysfunction with Movement:   Flexion                  90/60 = 30 DN   Extension              20/10 = 10 DN   Right side bending       30 NP   Left side bending          30 NP   Right rotation            50% DN   Left rotation               75% DN       LOWER EXTREMITY STRENGTH:   Left Right   Quadriceps 5/5 5/5   Hamstrings 5/5 5/5     EHL  3-/5 5/5   Anterior tibialis 4-/5 5/5   Peroneals   5/5 5/5         DTR's:   Left Right   Patella Tendon 2+ 2+   Achilles Tendon 1+ 1+           Selective Functional Movement Assessment:  FN: functional, non-painful  FP: functional, painful  DP: dysfunctional, painful  DN: dysfunctional, non-painful      Multi-Segmental Flexion:      see above   Multi-Segmental Extension: see above     Multi-Segmental Rotation:   right see above   left see above           FUNCTIONAL MOVEMENT BREAKOUTS:  Long sitting  = DN sacral angle 80  SLR   -Active   right FN 90  left DN   65    -Passive  right FN 90   left DN 70    Knees to chest   Active - DN       Press up  - DN   Prone Rocking - DN gluts not able to place on heels and prominent right lumbar PSM.   Lumbar exten / rot  Active  R- DN  L - DN  Passive  R - DN  L - DN    Lumbar locked rotation   Active  R - DN  L - DN  Passive  R - DN  L - DN    Hip extension   Active  R - FN  L - FN     GAIT: ambulates with no assistive device with independently.     GAIT DEVIATIONS: displays steady gait      Limitation/Restriction for FOTO lumbar spine IE Survey - not performed    Therapist reviewed FOTO scores for Bryant King on 1/5/2023.   FOTO documents entered into [employer] - see Media section.    Limitation Score: -%         TREATMENT     Total Treatment time (time-based codes) separate from Evaluation: 0 minutes          PATIENT EDUCATION AND HOME EXERCISES     Education provided:   - discussed stability vs mobility  concepts as it relates to his findings     Written Home Exercises Provided: no.     ASSESSMENT     Bryant is a 72 y.o. male referred to outpatient Physical Therapy with a medical diagnosis of Osteoarthritis of lumbar spine, unspecified spinal osteoarthritis complication status .  Patient presents with clinical findings of a hip flexion mobility dysfunction relative to gluteal and piriformis tightness with a spine flexion mobility dysfunction, a posterior chain mobility dysfunction specific to the LLE, a thoracic and a lumbar extension rotation mobility dysfunctions. He has segmental mobility limitations along with extra-articular tissue and muscular restrictions in the LLE, back extensors, QL. He does not exhibit pain with any of the movements assessed.     Patient prognosis is Good.   Patientt will benefit from skilled outpatient Physical Therapy to address the deficits stated above and in the chart below, provide patient /family education, and to maximize patientt's level of independence.     Plan of care discussed with patient: Yes  Patient's spiritual, cultural and educational needs considered and patient is agreeable to the plan of care and goals as stated below:     Anticipated Barriers for therapy: scheduling    Medical Necessity is demonstrated by the following  History  Co-morbidities and personal factors that may impact the plan of care Co-morbidities:   Past Medical History:   Diagnosis Date    Coronary artery disease     Hyperlipidemia     Hypertension     Migraines     Type 2 diabetes mellitus without complication, without long-term current use of insulin        Personal Factors:   no deficits     low   Examination  Body Structures and Functions, activity limitations and participation restrictions that may impact the plan of care Body Regions:   back  lower extremities  trunk    Body Systems:    musculoskeletal    Participation Restrictions:   None    Activity limitations:   Learning and applying  knowledge  no deficits    General Tasks and Commands  no deficits    Communication  no deficits    Mobility  walking  sitting    Self care  no deficits    Domestic Life  no deficits    Interactions/Relationships  no deficits    Life Areas  no deficits    Community and Social Life  community life  recreation and leisure         low   Clinical Presentation stable and uncomplicated low   Decision Making/ Complexity Score: low     Goals:    Goals to be met:    Short Term GOALS: 5 weeks. Pt agrees with goals set.  1. Pts pain intensity rising from sit to stand decreased 20-40% for improved quality of life and functional mobilty. ONGOING  2. Pt to demonstrate core activation with spinal stability during transitional movements for improved quality of movement. ONGOING  3. Patient demonstrates independence with HEP for self management . ONGOING  4. Patient demonstrates independence with Postural Awareness for control of pain.ONGOING  5. Pt demonstrates active SLR LLE  to 70 degrees to improve functional mobility. ONGOING  6. Pt to increase passive lumbar and thoracic extension rotation mobility to allow for increased active standing rotation by 25%. ONGOING     Long Term GOALS: 10 weeks. Pt agrees with goals set.  1. Patient demonstrates increased active thoracic extension rotation mobility to 30 to 50 degrees to improve functional mobility and tolerance to functional activities. ONGOING  2. Patient demonstrates increased BLE hip flexion mobility to 120 degrees  to improve functional mobility.  ONGOING  3. Patient demonstrates improved overall function and return demonstration to walk for 25 to 50% longer periods, rise from sitting and recreational lifestyle. ONGOING  4. Pt demonstrates [place] with body mechanics to control episodes of pain associated with daily ADL and improve functional lifestyle. ONGOING  5. Pts pain level decreased to 75% or greater with sit to stand and walking for restoring functional mobility  and ADL. ONGOING   PLAN   Plan of care Certification: 1/5/2023 to 4/5/2023.    Outpatient Physical Therapy 2 times weekly for 10 weeks to include the following interventions: Manual Therapy, Patient Education, and Therapeutic Exercise.     Nehemiah Alcaraz, PT      I CERTIFY THE NEED FOR THESE SERVICES FURNISHED UNDER THIS PLAN OF TREATMENT AND WHILE UNDER MY CARE   Physician's comments:     Physician's Signature: ___________________________________________________

## 2023-01-27 ENCOUNTER — CLINICAL SUPPORT (OUTPATIENT)
Dept: REHABILITATION | Facility: HOSPITAL | Age: 73
End: 2023-01-27
Attending: NURSE PRACTITIONER
Payer: MEDICARE

## 2023-01-27 DIAGNOSIS — M53.80 BACK TIGHTNESS: ICD-10-CM

## 2023-01-27 DIAGNOSIS — M62.81 WEAKNESS OF TRUNK MUSCULATURE: ICD-10-CM

## 2023-01-27 DIAGNOSIS — R25.9 DYSFUNCTIONAL MOVEMENTS: ICD-10-CM

## 2023-01-27 DIAGNOSIS — M25.69 BACK STIFFNESS: Primary | ICD-10-CM

## 2023-01-27 DIAGNOSIS — R52 PAIN AGGRAVATED BY WALKING: ICD-10-CM

## 2023-01-27 PROCEDURE — 97140 MANUAL THERAPY 1/> REGIONS: CPT | Mod: PO | Performed by: PHYSICAL THERAPIST

## 2023-01-27 PROCEDURE — 97110 THERAPEUTIC EXERCISES: CPT | Mod: PO | Performed by: PHYSICAL THERAPIST

## 2023-01-27 NOTE — PROGRESS NOTES
"              [facility]   Physical Therapy Treatment Note     Name: Bryant King  Clinic Number: [id number]    Therapy Diagnosis:   Encounter Diagnoses   Name Primary?    Back stiffness Yes    Back tightness     Dysfunctional movements     Pain aggravated by walking     Weakness of trunk musculature      Physician: Carlie Alvarado NP    Visit Date: 1/27/2023    Physician Orders: PT Eval and Treat low back  Medical Diagnosis from Referral:   Osteoarthritis of lumbar spine, unspecified spinal osteoarthritis complication status [M47.816  Evaluation Date: 1/5/2023  Authorization Period Expiration: 12/30/2023  Plan of Care Expiration: 4/5/2023  Progress Note Due: 2/5/2023  Visit # / Visits authorized: 1/ 1   FOTO: 1245/ 3   PTA Visit #: 0/5     Precautions: Standard    Time In:    1150  Time Out: 1250  Total Billable Time: 60 minutes      SUBJECTIVE     Pt reports: that the back is hurting. The pain is constant .  He was not issued a home exercise program at IE   Response to previous treatment: IE performed   Functional change: unchanged     Pain: 6/10  Location: low lumbar         OBJECTIVE           TREATMENT        Bryant received the treatments listed below:    - THERAPEUTIC EXERCISES to develop  strength, ROM, flexibility, posture, and core stabilization for 35 minutes including       .BOLD INDICATES ACTIVITIES PERFORMED / DISCUSSED     Leg press   Recumbent bike  Upright bike   UE ergometer  Treadmill   Elliptical       THERAPEUTIC EXERCISE  SUPINE  -core TA activation hold 5" x 12   -Stabilized  SLR x12  -pelvic tilts x20  -LTR x15  -bridge NB / WB with resistive ab/ad ring x15   -Level 1        SIDELYING  -open books x12      QUADRUPED  -prayer stretch hold 15" x     PRONE  -sustained extension 3'    STANDING.  -lumbar locked rotation   -lunge stretch at step   -windmill / open book     SITTING      MANUAL THERAPY TECHNIQUES  were applied to lumbosacral area for 20 " minutes.  -prone cephalocaudad oscillations   -prone SI oscillations bilaterally    MODALITY      PATIENT EDUCATION AND HOME EXERCISES     Home Exercises Provided and Patient Education Provided     Education provided:   - discussed role of the core muscles and lumbar spine stability.   -    Written Home Exercises Provided: no  ASSESSMENT     The patient performed the activities as noted. He responded well to manual interventions with elimination of pain. Rotation mobility compromised with LTR. Tightness in the QL and also segmentally in the lumbar and thoracic segments. Progress with spine mobility activities and also core training and strengthening.     Bryant Is progressing towards his goals.   Pt prognosis is Good.     Pt will continue to benefit from skilled outpatient physical therapy to address the deficits listed in the problem list box on initial evaluation, provide pt/family education and to maximize pt's level of independence in the home and community environment.     Pt's spiritual, cultural and educational needs considered and pt agreeable to plan of care and goals.     Anticipated barriers to physical therapy: scheduling     Goals:   Goals to be met:     Short Term GOALS: 5 weeks. Pt agrees with goals set.  1. Pts pain intensity rising from sit to stand decreased 20-40% for improved quality of life and functional mobilty. ONGOING  2. Pt to demonstrate core activation with spinal stability during transitional movements for improved quality of movement. ONGOING  3. Patient demonstrates independence with HEP for self management . ONGOING  4. Patient demonstrates independence with Postural Awareness for control of pain.ONGOING  5. Pt demonstrates active SLR LLE  to 70 degrees to improve functional mobility. ONGOING  6. Pt to increase passive lumbar and thoracic extension rotation mobility to allow for increased active standing rotation by 25%. ONGOING     Long Term GOALS: 10 weeks. Pt agrees with goals  set.  1. Patient demonstrates increased active thoracic extension rotation mobility to 30 to 50 degrees to improve functional mobility and tolerance to functional activities. ONGOING  2. Patient demonstrates increased BLE hip flexion mobility to 120 degrees  to improve functional mobility.  ONGOING  3. Patient demonstrates improved overall function and return demonstration to walk for 25 to 50% longer periods, rise from sitting and recreational lifestyle. ONGOING  4. Pt demonstrates [place] with body mechanics to control episodes of pain associated with daily ADL and improve functional lifestyle. ONGOING  5. Pts pain level decreased to 75% or greater with sit to stand and walking for restoring functional mobility and ADL. ONGOING     PLAN     Plan of care Certification: 1/5/2023 to 4/5/2023.     Outpatient Physical Therapy 2 times weekly for 10 weeks to include the following interventions: Manual Therapy, Patient Education, and Therapeutic Exercise.        Nehemiah Alcaraz, PT

## 2023-02-03 ENCOUNTER — CLINICAL SUPPORT (OUTPATIENT)
Dept: REHABILITATION | Facility: HOSPITAL | Age: 73
End: 2023-02-03
Attending: NURSE PRACTITIONER
Payer: MEDICARE

## 2023-02-03 DIAGNOSIS — M62.81 WEAKNESS OF TRUNK MUSCULATURE: ICD-10-CM

## 2023-02-03 DIAGNOSIS — M25.69 BACK STIFFNESS: Primary | ICD-10-CM

## 2023-02-03 DIAGNOSIS — R25.9 DYSFUNCTIONAL MOVEMENTS: ICD-10-CM

## 2023-02-03 DIAGNOSIS — R52 PAIN AGGRAVATED BY WALKING: ICD-10-CM

## 2023-02-03 DIAGNOSIS — M53.80 BACK TIGHTNESS: ICD-10-CM

## 2023-02-03 PROCEDURE — 97110 THERAPEUTIC EXERCISES: CPT | Mod: PO | Performed by: PHYSICAL THERAPIST

## 2023-02-03 NOTE — PROGRESS NOTES
"              [facility]   Physical Therapy Treatment Note     Name: Bryant King  Clinic Number: [id number]    Therapy Diagnosis:   Encounter Diagnoses   Name Primary?    Back stiffness Yes    Back tightness     Dysfunctional movements     Pain aggravated by walking     Weakness of trunk musculature      Physician: Carlie Alvarado NP    Visit Date: 2/3/2023    Physician Orders: PT Eval and Treat low back  Medical Diagnosis from Referral:   Osteoarthritis of lumbar spine, unspecified spinal osteoarthritis complication status [M47.816  Evaluation Date: 1/5/2023  Authorization Period Expiration: 12/30/2023  Plan of Care Expiration: 4/5/2023  Progress Note Due: 2/5/2023  Visit # / Visits authorized: 1/ 1, 2/20  FOTO: 1245/ 3   PTA Visit #: 0/5     Precautions: Standard    Time In: 1100  Time Out: 1200  Total Billable Time: 60 minutes      SUBJECTIVE     Pt reports: the back is not hurting.    He was not issued a home exercise program at [facility]   Response to previous treatment: did good   Functional change: unchanged     Pain: 0 /10  Location: low lumbar         OBJECTIVE           TREATMENT        Bryant received the treatments listed below:    - THERAPEUTIC EXERCISES to develop  strength, ROM, flexibility, posture, and core stabilization for 55 minutes including       .BOLD INDICATES ACTIVITIES PERFORMED / DISCUSSED     Leg press   Recumbent bike  Upright bike   UE ergometer 8'   Treadmill   Elliptical       THERAPEUTIC EXERCISE  SUPINE  -core TA activation hold 7" x 12   -pelvic tilts x20  -LTR x15  -bridge NB / WB with resistive ab/ad ring x15   -level 1 marching x12  -level 1 kick outs x12  -level 1 Stabilized  SLR x12  Level 1 hook lying   Level 2 90/90     SIDELYING  -open books x12 T / L biased     QUADRUPED  -modified prayer stretch ( right knee) x4 hold 15"     PRONE  -sustained extension 3'    STANDING.  -lumbar locked rotation   -lunge stretch at step   -windmill / open book "     SITTING      MANUAL THERAPY TECHNIQUES  were applied to lumbosacral area for -  minutes.  -prone cephalocaudad oscillations   -prone SI oscillations bilaterally    MODALITY      PATIENT EDUCATION AND HOME EXERCISES     Home Exercises Provided and Patient Education Provided     Education provided:   - discussed role of the core muscles and lumbar spine stability.   -    Written Home Exercises Provided: no  ASSESSMENT     The patient performed the activities as outlined. He did not demonstrate any significant mobility limitation associated with the exercises. He was able to perform core activation incorporating the RA. Progress with core strengthening.     Bryant Is progressing towards his goals.   Pt prognosis is Good.     Pt will continue to benefit from skilled outpatient physical therapy to address the deficits listed in the problem list box on initial evaluation, provide pt/family education and to maximize pt's level of independence in the home and community environment.     Pt's spiritual, cultural and educational needs considered and pt agreeable to plan of care and goals.     Anticipated barriers to physical therapy: scheduling     Goals:   Goals to be met:     Short Term GOALS: 5 weeks. Pt agrees with goals set.  1. Pts pain intensity rising from sit to stand decreased 20-40% for improved quality of life and functional mobilty. ONGOING  2. Pt to demonstrate core activation with spinal stability during transitional movements for improved quality of movement. ONGOING  3. Patient demonstrates independence with HEP for self management . ONGOING  4. Patient demonstrates independence with Postural Awareness for control of pain.ONGOING  5. Pt demonstrates active SLR LLE  to 70 degrees to improve functional mobility. ONGOING  6. Pt to increase passive lumbar and thoracic extension rotation mobility to allow for increased active standing rotation by 25%. ONGOING     Long Term GOALS: 10 weeks. Pt agrees with goals  set.  1. Patient demonstrates increased active thoracic extension rotation mobility to 30 to 50 degrees to improve functional mobility and tolerance to functional activities. ONGOING  2. Patient demonstrates increased BLE hip flexion mobility to 120 degrees  to improve functional mobility.  ONGOING  3. Patient demonstrates improved overall function and return demonstration to walk for 25 to 50% longer periods, rise from sitting and recreational lifestyle. ONGOING  4. Pt demonstrates [place] with body mechanics to control episodes of pain associated with daily ADL and improve functional lifestyle. ONGOING  5. Pts pain level decreased to 75% or greater with sit to stand and walking for restoring functional mobility and ADL. ONGOING     PLAN     Plan of care Certification: 1/5/2023 to 4/5/2023.     Outpatient Physical Therapy 2 times weekly for 10 weeks to include the following interventions: Manual Therapy, Patient Education, and Therapeutic Exercise.        Nehemiah Alcaraz, PT

## 2023-02-06 ENCOUNTER — CLINICAL SUPPORT (OUTPATIENT)
Dept: REHABILITATION | Facility: HOSPITAL | Age: 73
End: 2023-02-06
Attending: NURSE PRACTITIONER
Payer: MEDICARE

## 2023-02-06 DIAGNOSIS — R25.9 DYSFUNCTIONAL MOVEMENTS: ICD-10-CM

## 2023-02-06 DIAGNOSIS — M53.80 BACK TIGHTNESS: ICD-10-CM

## 2023-02-06 DIAGNOSIS — M62.81 WEAKNESS OF TRUNK MUSCULATURE: ICD-10-CM

## 2023-02-06 DIAGNOSIS — R52 PAIN AGGRAVATED BY WALKING: ICD-10-CM

## 2023-02-06 DIAGNOSIS — M25.69 BACK STIFFNESS: Primary | ICD-10-CM

## 2023-02-06 PROCEDURE — 97110 THERAPEUTIC EXERCISES: CPT | Mod: PO | Performed by: PHYSICAL THERAPIST

## 2023-02-06 NOTE — PROGRESS NOTES
"              [facility]   Physical Therapy Treatment Note     Name: Bryant King  Clinic Number: [id number]    Therapy Diagnosis:   Encounter Diagnoses   Name Primary?    Back stiffness Yes    Back tightness     Dysfunctional movements     Pain aggravated by walking     Weakness of trunk musculature      Physician: Carlie Alvarado NP    Visit Date: 2/6/2023    Physician Orders: PT Eval and Treat low back  Medical Diagnosis from Referral:   Osteoarthritis of lumbar spine, unspecified spinal osteoarthritis complication status [M47.816  Evaluation Date: 1/5/2023  Authorization Period Expiration: 12/30/2023  Plan of Care Expiration: 4/5/2023  Progress Note Due: 2/5/2023  Visit # / Visits authorized: 1/ 1, 3 / 20  FOTO: 1245/ 3   PTA Visit #: 0/5     Precautions: Standard    Time In: 1120   arrived 1109  Time Out: 1220  Total Billable Time: 60 minutes      SUBJECTIVE     Pt reports: the back is fine and there is no pain.   He was not issued a home exercise program at    Response to previous treatment: did good   Functional change: unchanged     Pain: 0 /10  Location: low lumbar         OBJECTIVE           TREATMENT        Bryant received the treatments listed below:    - THERAPEUTIC EXERCISES to develop  strength, ROM, flexibility, posture, and core stabilization for 58 minutes including       .BOLD INDICATES ACTIVITIES PERFORMED / DISCUSSED     Leg press   Recumbent bike  Upright bike   UE ergometer 8'   Treadmill   Elliptical       THERAPEUTIC EXERCISE  SUPINE  -core TA activation hold 7" x 12   -pelvic tilts x20  -LTR x15  -bridge NB / WB with resistive ab/ad ring x15   -level 1 marching x12  -level 1 kick outs x12  -level 1 Stabilized  SLR x12  Level 1 hook lying   Level 2 90/90     SIDELYING  -open books x12 T / L biased     QUADRUPED  -modified prayer stretch ( right knee) x4 hold 15"     PRONE  -sustained extension 3'    STANDING.  -lumbar locked rotation x15  -lunge stretch at " "step hold 10 " x 6 ea  -windmill / open book     SITTING      MANUAL THERAPY TECHNIQUES  were applied to lumbosacral area for -  minutes.  -prone cephalocaudad oscillations   -prone SI oscillations bilaterally    MODALITY      PATIENT EDUCATION AND HOME EXERCISES     Home Exercises Provided and Patient Education Provided     Education provided: advised to perform LTR, pelvic tilts, bridges, lunge stretch and sust extension at home.   - discussed role of the core muscles and lumbar spine stability.   -    Written Home Exercises Provided: no  ASSESSMENT      The routine was performed as noted and completed without any difficulty. He is demonstrating satisfactory core activation. Mobility is not a significant factor although extension rotation in the lumbar and thoracic spines remain dysfunctional.     Bryant Is progressing towards his goals.   Pt prognosis is Good.     Pt will continue to benefit from skilled outpatient physical therapy to address the deficits listed in the problem list box on initial evaluation, provide pt/family education and to maximize pt's level of independence in the home and community environment.     Pt's spiritual, cultural and educational needs considered and pt agreeable to plan of care and goals.     Anticipated barriers to physical therapy: scheduling     Goals:   Goals to be met:     Short Term GOALS: 5 weeks. Pt agrees with goals set.  1. Pts pain intensity rising from sit to stand decreased 20-40% for improved quality of life and functional mobilty. ONGOING  2. Pt to demonstrate core activation with spinal stability during transitional movements for improved quality of movement. ONGOING  3. Patient demonstrates independence with HEP for self management . ONGOING  4. Patient demonstrates independence with Postural Awareness for control of pain.ONGOING  5. Pt demonstrates active SLR LLE  to 70 degrees to improve functional mobility. ONGOING  6. Pt to increase passive lumbar and thoracic " extension rotation mobility to allow for increased active standing rotation by 25%. ONGOING     Long Term GOALS: 10 weeks. Pt agrees with goals set.  1. Patient demonstrates increased active thoracic extension rotation mobility to 30 to 50 degrees to improve functional mobility and tolerance to functional activities. ONGOING  2. Patient demonstrates increased BLE hip flexion mobility to 120 degrees  to improve functional mobility.  ONGOING  3. Patient demonstrates improved overall function and return demonstration to walk for 25 to 50% longer periods, rise from sitting and recreational lifestyle. ONGOING  4. Pt demonstrates [place] with body mechanics to control episodes of pain associated with daily ADL and improve functional lifestyle. ONGOING  5. Pts pain level decreased to 75% or greater with sit to stand and walking for restoring functional mobility and ADL. ONGOING     PLAN     Plan of care Certification: 1/5/2023 to 4/5/2023.     Outpatient Physical Therapy 2 times weekly for 10 weeks to include the following interventions: Manual Therapy, Patient Education, and Therapeutic Exercise.        Nehemiah Alcaraz, PT

## 2023-02-08 ENCOUNTER — CLINICAL SUPPORT (OUTPATIENT)
Dept: REHABILITATION | Facility: HOSPITAL | Age: 73
End: 2023-02-08
Attending: NURSE PRACTITIONER
Payer: MEDICARE

## 2023-02-08 DIAGNOSIS — M25.69 BACK STIFFNESS: Primary | ICD-10-CM

## 2023-02-08 DIAGNOSIS — M62.81 WEAKNESS OF TRUNK MUSCULATURE: ICD-10-CM

## 2023-02-08 DIAGNOSIS — M53.80 BACK TIGHTNESS: ICD-10-CM

## 2023-02-08 DIAGNOSIS — R52 PAIN AGGRAVATED BY WALKING: ICD-10-CM

## 2023-02-08 DIAGNOSIS — R25.9 DYSFUNCTIONAL MOVEMENTS: ICD-10-CM

## 2023-02-08 PROCEDURE — 97110 THERAPEUTIC EXERCISES: CPT | Mod: PO | Performed by: PHYSICAL THERAPIST

## 2023-02-08 NOTE — PROGRESS NOTES
" [facility]   Physical Therapy Treatment Note     Name: Bryant King  Clinic Number: [id number]    Therapy Diagnosis:   Encounter Diagnoses   Name Primary?    Back stiffness Yes    Back tightness     Dysfunctional movements     Pain aggravated by walking     Weakness of trunk musculature      Physician: Carlie Alvarado NP    Visit Date: 2/8/2023    Physician Orders: PT Eval and Treat low back  Medical Diagnosis from Referral:   Osteoarthritis of lumbar spine, unspecified spinal osteoarthritis complication status [M47.816  Evaluation Date: 1/5/2023  Authorization Period Expiration: 12/30/2023  Plan of Care Expiration: 4/5/2023  Progress Note Due: 2/5/2023  Visit # / Visits authorized: 1/ 1, 4 / 20  FOTO: 1245/ 3   PTA Visit #: 0/5     Precautions: Standard    Time In: 1005  Time Out: 1105  Total Billable Time: 60 minutes      SUBJECTIVE     Pt reports: the back feels good.    He was not issued a home exercise program at [facility]   Response to previous treatment: did good   Functional change: unchanged     Pain: 0 /10  Location: low lumbar         OBJECTIVE           TREATMENT        Bryant received the treatments listed below:    - THERAPEUTIC EXERCISES to develop  strength, ROM, flexibility, posture, and core stabilization for 58 minutes including       .BOLD INDICATES ACTIVITIES PERFORMED / DISCUSSED     Leg press   Recumbent bike  Upright bike   UE ergometer 8'   Treadmill   Elliptical       THERAPEUTIC EXERCISE  SUPINE  -core TA activation hold 7" x 12   -pelvic tilts x20  -LTR x15  -bridge NB / WB with resistive ab/ad ring x15   -level 2 marching x12  -level 2 kick outs x12  -level 2 Stabilized  SLR x12  Level 1 hook lying   Level 2 90/90     SIDELYING  -open books x12 T / L biased     QUADRUPED  -modified prayer stretch ( right knee) x8 hold 15"     PRONE  -sustained extension 3'    STANDING.  -lumbar locked rotation x15  -lunge stretch at step hold 10 " x 6 ea  -windmill " / open book     SITTING      MANUAL THERAPY TECHNIQUES  were applied to lumbosacral area for -  minutes.  -prone cephalocaudad oscillations   -prone SI oscillations bilaterally    MODALITY      PATIENT EDUCATION AND HOME EXERCISES     Home Exercises Provided and Patient Education Provided     Education provided: advised to perform LTR, pelvic tilts, bridges, lunge stretch and sust extension at home.   - discussed role of the core muscles and lumbar spine stability.   -    Written Home Exercises Provided: no  ASSESSMENT     The patient performed the exercises without any significant  cueing. Progression to level 2 was tolerated well. Only required some correction regarding LE positioning when performing the movements. He required correction to guard against overstretching during the lunge stretch. Pain is not a factor with the exercises performed.     Bryant Is progressing towards his goals.   Pt prognosis is Good.     Pt will continue to benefit from skilled outpatient physical therapy to address the deficits listed in the problem list box on initial evaluation, provide pt/family education and to maximize pt's level of independence in the home and community environment.     Pt's spiritual, cultural and educational needs considered and pt agreeable to plan of care and goals.     Anticipated barriers to physical therapy: scheduling     Goals:   Goals to be met:     Short Term GOALS: 5 weeks. Pt agrees with goals set.  1. Pts pain intensity rising from sit to stand decreased 20-40% for improved quality of life and functional mobilty. ONGOING  2. Pt to demonstrate core activation with spinal stability during transitional movements for improved quality of movement. ONGOING  3. Patient demonstrates independence with HEP for self management . ONGOING  4. Patient demonstrates independence with Postural Awareness for control of pain.ONGOING  5. Pt demonstrates active SLR LLE  to 70 degrees to improve functional mobility.  ONGOING  6. Pt to increase passive lumbar and thoracic extension rotation mobility to allow for increased active standing rotation by 25%. ONGOING     Long Term GOALS: 10 weeks. Pt agrees with goals set.  1. Patient demonstrates increased active thoracic extension rotation mobility to 30 to 50 degrees to improve functional mobility and tolerance to functional activities. ONGOING  2. Patient demonstrates increased BLE hip flexion mobility to 120 degrees  to improve functional mobility.  ONGOING  3. Patient demonstrates improved overall function and return demonstration to walk for 25 to 50% longer periods, rise from sitting and recreational lifestyle. ONGOING  4. Pt demonstrates [place] with body mechanics to control episodes of pain associated with daily ADL and improve functional lifestyle. ONGOING  5. Pts pain level decreased to 75% or greater with sit to stand and walking for restoring functional mobility and ADL. ONGOING     PLAN     Plan of care Certification: 1/5/2023 to 4/5/2023.     Outpatient Physical Therapy 2 times weekly for 10 weeks to include the following interventions: Manual Therapy, Patient Education, and Therapeutic Exercise.        Nehemiah Alcaraz, PT

## 2023-02-16 ENCOUNTER — CLINICAL SUPPORT (OUTPATIENT)
Dept: REHABILITATION | Facility: HOSPITAL | Age: 73
End: 2023-02-16
Attending: NURSE PRACTITIONER
Payer: MEDICARE

## 2023-02-16 DIAGNOSIS — R25.9 DYSFUNCTIONAL MOVEMENTS: ICD-10-CM

## 2023-02-16 DIAGNOSIS — M53.80 BACK TIGHTNESS: ICD-10-CM

## 2023-02-16 DIAGNOSIS — R52 PAIN AGGRAVATED BY WALKING: ICD-10-CM

## 2023-02-16 DIAGNOSIS — M25.69 BACK STIFFNESS: Primary | ICD-10-CM

## 2023-02-16 DIAGNOSIS — M62.81 WEAKNESS OF TRUNK MUSCULATURE: ICD-10-CM

## 2023-02-16 PROCEDURE — 97110 THERAPEUTIC EXERCISES: CPT | Mod: PO | Performed by: PHYSICAL THERAPIST

## 2023-02-16 NOTE — PROGRESS NOTES
"              [facility]   Physical Therapy Treatment Note     Name: Bryant King  Clinic Number: [id number]    Therapy Diagnosis:   Encounter Diagnoses   Name Primary?    Back stiffness Yes    Back tightness     Dysfunctional movements     Pain aggravated by walking     Weakness of trunk musculature      Physician: Carlie Alvarado NP    Visit Date: 2/16/2023    Physician Orders: PT Eval and Treat low back  Medical Diagnosis from Referral:   Osteoarthritis of lumbar spine, unspecified spinal osteoarthritis complication status [M47.816  Evaluation Date: 1/5/2023  Authorization Period Expiration: 12/30/2023  Plan of Care Expiration: 4/5/2023  Progress Note Due: 2/5/2023  Visit # / Visits authorized: 1/ 1, 5 / 20  FOTO: 1245/ 3   PTA Visit #: 0/5     Precautions: Standard    Time In: 1310  Time Out: 1  Total Billable Time:  minutes      SUBJECTIVE     Pt reports: there is no pain. Feeling good. Played golf today.    He was not issued a [facility] exercise program at [facility]   Response to previous treatment: did good   Functional change: unchanged     Pain: 0 /10  Location: low lumbar         OBJECTIVE           TREATMENT        Bryant received the treatments listed below:    - THERAPEUTIC EXERCISES to develop  strength, ROM, flexibility, posture, and core stabilization for 60 minutes including       .BOLD INDICATES ACTIVITIES PERFORMED / DISCUSSED     Leg press   Recumbent bike  Upright bike   UE ergometer 8'   Treadmill   Elliptical       THERAPEUTIC EXERCISE  SUPINE  -core TA activation hold 7" x 12   -pelvic tilts x20  -LTR x15  -bridge NB / WB with resistive ab/ad ring x15   -level 2 marching x12  -level 2 kick outs x12  -level 2 Stabilized  SLR x12  Level 1 hook lying   Level 2 90/90     SIDELYING  -open books x12 T / L biased     QUADRUPED  -modified prayer stretch ( right knee) x8 hold 15"     PRONE  -sustained extension 3'    STANDING.  -lumbar locked rotation x15  -lunge stretch at step " "hold 10 " x 6 ea  -windmill / open book     SITTING      MANUAL THERAPY TECHNIQUES  were applied to lumbosacral area for -  minutes.  -prone cephalocaudad oscillations   -prone SI oscillations bilaterally    MODALITY      PATIENT EDUCATION AND HOME EXERCISES     Home Exercises Provided and Patient Education Provided     Education provided: advised to perform LTR, pelvic tilts, bridges, lunge stretch and sust extension at home.   - discussed role of the core muscles and lumbar spine stability.   -    Written Home Exercises Provided: no  ASSESSMENT     The patient demonstrated good tolerance to all activities performed. He did not encounter any significant challenges with the exercises. Will progress with next phase of level 2 strengthening (no limb touch ) and ball stability exercises. Provide with HEP.     Bryant Is progressing towards his goals.   Pt prognosis is Good.     Pt will continue to benefit from skilled outpatient physical therapy to address the deficits listed in the problem list box on initial evaluation, provide pt/family education and to maximize pt's level of independence in the home and community environment.     Pt's spiritual, cultural and educational needs considered and pt agreeable to plan of care and goals.     Anticipated barriers to physical therapy: scheduling     Goals:   Goals to be met:     Short Term GOALS: 5 weeks. Pt agrees with goals set.  1. Pts pain intensity rising from sit to stand decreased 20-40% for improved quality of life and functional mobilty. ONGOING  2. Pt to demonstrate core activation with spinal stability during transitional movements for improved quality of movement. ONGOING  3. Patient demonstrates independence with HEP for self management . ONGOING  4. Patient demonstrates independence with Postural Awareness for control of pain.ONGOING  5. Pt demonstrates active SLR LLE  to 70 degrees to improve functional mobility. ONGOING  6. Pt to increase passive lumbar and " thoracic extension rotation mobility to allow for increased active standing rotation by 25%. ONGOING     Long Term GOALS: 10 weeks. Pt agrees with goals set.  1. Patient demonstrates increased active thoracic extension rotation mobility to 30 to 50 degrees to improve functional mobility and tolerance to functional activities. ONGOING  2. Patient demonstrates increased BLE hip flexion mobility to 120 degrees  to improve functional mobility.  ONGOING  3. Patient demonstrates improved overall function and return demonstration to walk for 25 to 50% longer periods, rise from sitting and recreational lifestyle. ONGOING  4. Pt demonstrates [place] with body mechanics to control episodes of pain associated with daily ADL and improve functional lifestyle. ONGOING  5. Pts pain level decreased to 75% or greater with sit to stand and walking for restoring functional mobility and ADL. ONGOING     PLAN     Plan of care Certification: 1/5/2023 to 4/5/2023.     Outpatient Physical Therapy 2 times weekly for 10 weeks to include the following interventions: Manual Therapy, Patient Education, and Therapeutic Exercise.        Nehemiah Alcaraz, PT

## 2023-02-24 ENCOUNTER — CLINICAL SUPPORT (OUTPATIENT)
Dept: REHABILITATION | Facility: HOSPITAL | Age: 73
End: 2023-02-24
Attending: NURSE PRACTITIONER
Payer: MEDICARE

## 2023-02-24 DIAGNOSIS — M53.80 BACK TIGHTNESS: ICD-10-CM

## 2023-02-24 DIAGNOSIS — M25.69 BACK STIFFNESS: Primary | ICD-10-CM

## 2023-02-24 DIAGNOSIS — M62.81 WEAKNESS OF TRUNK MUSCULATURE: ICD-10-CM

## 2023-02-24 DIAGNOSIS — R52 PAIN AGGRAVATED BY WALKING: ICD-10-CM

## 2023-02-24 DIAGNOSIS — R25.9 DYSFUNCTIONAL MOVEMENTS: ICD-10-CM

## 2023-02-24 PROCEDURE — 97110 THERAPEUTIC EXERCISES: CPT | Mod: PO | Performed by: PHYSICAL THERAPIST

## 2023-02-24 NOTE — PROGRESS NOTES
"              [facility]   Physical Therapy Treatment Note     Name: Bryant King  Clinic Number: [id number]    Therapy Diagnosis:   Encounter Diagnoses   Name Primary?    Back stiffness Yes    Back tightness     Dysfunctional movements     Pain aggravated by walking     Weakness of trunk musculature      Physician: Carlie Alvarado NP    Visit Date: 2/24/2023    Physician Orders: PT Eval and Treat low back  Medical Diagnosis from Referral:   Osteoarthritis of lumbar spine, unspecified spinal osteoarthritis complication status [M47.816  Evaluation Date: 1/5/2023  Authorization Period Expiration: 12/30/2023  Plan of Care Expiration: 4/5/2023  Progress Note Due: 2/5/2023  Visit # / Visits authorized: 1/ 1, 6 / 20  FOTO: 1245/ 3   PTA Visit #: 0/5     Precautions: Standard    Time In: 910  Time Out: 1010  Total Billable Time: 60 minutes      SUBJECTIVE     Pt reports: there is no pain.    He was issued a home exercise program at visit 6 on 2/24/2023.  Response to previous treatment: did good   Functional change: unchanged     Pain: 0 /10  Location: low lumbar         OBJECTIVE     Bryant received the treatments listed below:    - THERAPEUTIC EXERCISES to develop  strength, ROM, flexibility, posture, and core stabilization for 45 minutes including       .BOLD INDICATES ACTIVITIES PERFORMED / DISCUSSED     Leg press   Recumbent bike  Upright bike   UE ergometer 8'   Treadmill   Elliptical       THERAPEUTIC EXERCISE  SUPINE  -core TA activation hold 7" x 12   -pelvic tilts x20  -LTR x15  -bridge NB / WB with resistive ab/ad ring x15   -level 2 marching x12  -level 2 kick outs x12  -level 2 Stabilized  SLR x12  Level 1 hook lying   Level 2 90/90                  Table touch     SIDELYING  -open books x12 T / L biased   -open books x12 T / L biased 2 hands     QUADRUPED  -modified prayer stretch ( right knee) x8 hold 15"     PRONE  -sustained extension 3'    STANDING.  -lumbar locked rotation " "x15  -lunge stretch at step hold 10 " x 6 ea  -windmill / open book     SITTING      MANUAL THERAPY TECHNIQUES  were applied to lumbosacral area for -  minutes.  -prone cephalocaudad oscillations   -prone SI oscillations bilaterally    MODALITY      PATIENT EDUCATION AND HOME EXERCISES     Home Exercises Provided and Patient Education Provided     Education provided: advised to perform LTR, pelvic tilts, bridges, lunge stretch and sust extension at home.   - discussed role of the core muscles and lumbar spine stability.   -    Written Home Exercises Provided: yes.  Exercises were reviewed and Bryant was able to demonstrate them prior to the end of the session.  Bryant demonstrated good  understanding of the education provided.     See EMR under Patient Instructions for exercises provided 2/24/2023.  .    ASSESSMENT      The patient is progressing very well. He completed all the activities noted without any significant difficulty. Progressing with strengthening activities for the core and will also add back extensor activities as well as stability ball.     Bryant Is progressing towards his goals.   Pt prognosis is Good.     Pt will continue to benefit from skilled outpatient physical therapy to address the deficits listed in the problem list box on initial evaluation, provide pt/family education and to maximize pt's level of independence in the home and community environment.     Pt's spiritual, cultural and educational needs considered and pt agreeable to plan of care and goals.     Anticipated barriers to physical therapy: scheduling     Goals:   Goals to be met:     Short Term GOALS: 5 weeks. Pt agrees with goals set.  1. Pts pain intensity rising from sit to stand decreased 20-40% for improved quality of life and functional mobilty. ONGOING  2. Pt to demonstrate core activation with spinal stability during transitional movements for improved quality of movement. ONGOING  3. Patient demonstrates independence with " HEP for self management . ONGOING  4. Patient demonstrates independence with Postural Awareness for control of pain.ONGOING  5. Pt demonstrates active SLR LLE  to 70 degrees to improve functional mobility. ONGOING  6. Pt to increase passive lumbar and thoracic extension rotation mobility to allow for increased active standing rotation by 25%. ONGOING     Long Term GOALS: 10 weeks. Pt agrees with goals set.  1. Patient demonstrates increased active thoracic extension rotation mobility to 30 to 50 degrees to improve functional mobility and tolerance to functional activities. ONGOING  2. Patient demonstrates increased BLE hip flexion mobility to 120 degrees  to improve functional mobility.  ONGOING  3. Patient demonstrates improved overall function and return demonstration to walk for 25 to 50% longer periods, rise from sitting and recreational lifestyle. ONGOING  4. Pt demonstrates [place] with body mechanics to control episodes of pain associated with daily ADL and improve functional lifestyle. ONGOING  5. Pts pain level decreased to 75% or greater with sit to stand and walking for restoring functional mobility and ADL. ONGOING     PLAN     Plan of care Certification: 1/5/2023 to 4/5/2023.     Outpatient Physical Therapy 2 times weekly for 10 weeks to include the following interventions: Manual Therapy, Patient Education, and Therapeutic Exercise.        Nehemiah Alcaraz, PT

## 2023-02-28 ENCOUNTER — CLINICAL SUPPORT (OUTPATIENT)
Dept: REHABILITATION | Facility: HOSPITAL | Age: 73
End: 2023-02-28
Attending: NURSE PRACTITIONER
Payer: MEDICARE

## 2023-02-28 DIAGNOSIS — R52 PAIN AGGRAVATED BY WALKING: ICD-10-CM

## 2023-02-28 DIAGNOSIS — M62.81 WEAKNESS OF TRUNK MUSCULATURE: ICD-10-CM

## 2023-02-28 DIAGNOSIS — M25.69 BACK STIFFNESS: Primary | ICD-10-CM

## 2023-02-28 DIAGNOSIS — R25.9 DYSFUNCTIONAL MOVEMENTS: ICD-10-CM

## 2023-02-28 DIAGNOSIS — M53.80 BACK TIGHTNESS: ICD-10-CM

## 2023-02-28 PROCEDURE — 97110 THERAPEUTIC EXERCISES: CPT | Mod: PO | Performed by: PHYSICAL THERAPIST

## 2023-02-28 NOTE — PROGRESS NOTES
"              [facility]   Physical Therapy Treatment Note     Name: Bryant King  Clinic Number: [id number]    Therapy Diagnosis:   Encounter Diagnoses   Name Primary?    Back stiffness Yes    Back tightness     Dysfunctional movements     Pain aggravated by walking     Weakness of trunk musculature      Physician: Carlie Alvarado NP    Visit Date: 2/28/2023    Physician Orders: PT Eval and Treat low back  Medical Diagnosis from Referral:   Osteoarthritis of lumbar spine, unspecified spinal osteoarthritis complication status [M47.816  Evaluation Date: 1/5/2023  Authorization Period Expiration: 12/30/2023  Plan of Care Expiration: 4/5/2023  Progress Note Due: 2/5/2023  Visit # / Visits authorized: 1/ 1, 7 / 20  FOTO: 1245/ 3   PTA Visit #: 0/5     Precautions: Standard    Time In: 1310  Time Out: 1415  Total Billable Time: 65 minutes      SUBJECTIVE     Pt reports: no pain but I experience pain when I walk or even play golf. I have a lot of problem turning.   He was issued a home exercise program at visit 6 on 2/24/2023.  Response to previous treatment: did good   Functional change: unchanged     Pain: 0 /10  Location: low lumbar         OBJECTIVE     Bryant received the treatments listed below:    - THERAPEUTIC EXERCISES to develop  strength, ROM, flexibility, posture, and core stabilization for 63 minutes including       .BOLD INDICATES ACTIVITIES PERFORMED / DISCUSSED     Leg press   Recumbent bike  Upright bike   UE ergometer 8'   Treadmill   Elliptical       THERAPEUTIC EXERCISE  SUPINE  -core TA activation hold 7" x 12   -pelvic tilts x20  -LTR x15  -bridge NB / WB with resistive ab/ad ring x15   -level 2 marching x12  -level 2 kick outs x12  -level 2 Stabilized  SLR x12  Level 1 hook lying   Level 2 90/90                  Table touch     SIDELYING  -open books x12 T / L biased   -open books x12 T / L biased 2 hands     QUADRUPED  -modified prayer stretch ( right knee) x8 hold " "15"     PRONE  -sustained extension 3'    STANDING.  -lumbar locked rotation x15  -lunge stretch at step hold 10 " x 6 ea  -windmill / open book     SITTING  -stability ball rotation x6   RED   -stability ball marching x6  -stability ball toy soldier x6    MANUAL THERAPY TECHNIQUES  were applied to lumbosacral area for -  minutes.  -prone cephalocaudad oscillations   -prone SI oscillations bilaterally    MODALITY      PATIENT EDUCATION AND HOME EXERCISES     Home Exercises Provided and Patient Education Provided     Education provided: advised to perform LTR, pelvic tilts, bridges, lunge stretch and sust extension at home.   - discussed role of the core muscles and lumbar spine stability.   -    Written Home Exercises Provided: yes.  Exercises were reviewed and Bryant was able to demonstrate them prior to the end of the session.  Bryant demonstrated good  understanding of the education provided.     See EMR under Patient Instructions for exercises provided 2/24/2023.  .    ASSESSMENT      The routine was performed and completed as noted. He was challenged with stability ball activities. Performed the exercises with some V/T cueing demonstrating his lack of follow-up with the exercises.   Will progress with trunk strengthening and stability training of the L-spine.     Bryant Is progressing towards his goals.   Pt prognosis is Good.     Pt will continue to benefit from skilled outpatient physical therapy to address the deficits listed in the problem list box on initial evaluation, provide pt/family education and to maximize pt's level of independence in the home and community environment.     Pt's spiritual, cultural and educational needs considered and pt agreeable to plan of care and goals.     Anticipated barriers to physical therapy: scheduling     Goals:   Goals to be met:     Short Term GOALS: 5 weeks. Pt agrees with goals set.  1. Pts pain intensity rising from sit to stand decreased 20-40% for improved " quality of life and functional mobilty. ONGOING  2. Pt to demonstrate core activation with spinal stability during transitional movements for improved quality of movement. ONGOING  3. Patient demonstrates independence with HEP for self management . ONGOING  4. Patient demonstrates independence with Postural Awareness for control of pain.ONGOING  5. Pt demonstrates active SLR LLE  to 70 degrees to improve functional mobility. ONGOING  6. Pt to increase passive lumbar and thoracic extension rotation mobility to allow for increased active standing rotation by 25%. ONGOING     Long Term GOALS: 10 weeks. Pt agrees with goals set.  1. Patient demonstrates increased active thoracic extension rotation mobility to 30 to 50 degrees to improve functional mobility and tolerance to functional activities. ONGOING  2. Patient demonstrates increased BLE hip flexion mobility to 120 degrees  to improve functional mobility.  ONGOING  3. Patient demonstrates improved overall function and return demonstration to walk for 25 to 50% longer periods, rise from sitting and recreational lifestyle. ONGOING  4. Pt demonstrates [place] with body mechanics to control episodes of pain associated with daily ADL and improve functional lifestyle. ONGOING  5. Pts pain level decreased to 75% or greater with sit to stand and walking for restoring functional mobility and ADL. ONGOING     PLAN     Plan of care Certification: 1/5/2023 to 4/5/2023.     Outpatient Physical Therapy 2 times weekly for 10 weeks to include the following interventions: Manual Therapy, Patient Education, and Therapeutic Exercise.        Nehemiah Alcaraz, PT

## 2023-06-26 NOTE — LETTER
[facility] - General Surgery  [address]  [facility] [address]  Phone: [phone] July 13, 2017      Eric Biswas Jr., MD  [address]  [facility] [address]    Patient: Bryant King   MR Number: [id number]   YOB: 1950   Date of Visit: 7/13/2017     Dear Dr. Biswas:    Thank you for referring Bryant King to me for evaluation. Below are the relevant portions of my assessment and plan of care.    Patient is status post laparoscopic hiatal hernia repair with mesh and Toupet fundoplication 6/29/17.  His pre-op symptoms are resolved.  He has taken Zofran twice but has not had any dysphagia on a soft diet.  He denies heartburn and reflux.    PLAN:  He is happy with his procedure.  Advance diet as tolerated.  Light duty for one more month.  Follow up one month.    If you have questions, please do not hesitate to call me. I look forward to following Bryant along with you.    Sincerely,      Bill Jara MD   Section Head - General, Laparoscopic, Bariatric  Acute Care and Oncologic Surgery   - Surgical Weight Loss Program  [facility]    WSR/darlenek    CC  MD Duane Douglas MD      4 = No assist / stand by assistance

## 2023-07-29 DIAGNOSIS — I10 HTN (HYPERTENSION), BENIGN: ICD-10-CM

## 2023-07-29 DIAGNOSIS — I25.10 CORONARY ARTERY DISEASE INVOLVING NATIVE CORONARY ARTERY OF NATIVE HEART WITHOUT ANGINA PECTORIS: ICD-10-CM

## 2023-07-31 RX ORDER — CARVEDILOL 12.5 MG/1
TABLET ORAL
Qty: 180 TABLET | Refills: 3 | Status: SHIPPED | OUTPATIENT
Start: 2023-07-31

## 2023-10-16 DIAGNOSIS — Z95.1 S/P CABG X 1: ICD-10-CM

## 2023-10-16 DIAGNOSIS — I25.10 CORONARY ARTERY DISEASE INVOLVING NATIVE CORONARY ARTERY OF NATIVE HEART WITHOUT ANGINA PECTORIS: ICD-10-CM

## 2023-10-16 DIAGNOSIS — Z95.2 S/P AVR (AORTIC VALVE REPLACEMENT): ICD-10-CM

## 2023-10-16 RX ORDER — NITROGLYCERIN 0.4 MG/1
TABLET SUBLINGUAL
Qty: 25 TABLET | Refills: 10 | Status: SHIPPED | OUTPATIENT
Start: 2023-10-16

## 2023-10-16 RX ORDER — AMOXICILLIN 500 MG/1
TABLET, FILM COATED ORAL
Qty: 12 TABLET | Refills: 10 | Status: SHIPPED | OUTPATIENT
Start: 2023-10-16

## 2023-10-24 ENCOUNTER — PATIENT MESSAGE (OUTPATIENT)
Dept: CARDIOLOGY | Facility: CLINIC | Age: 73
End: 2023-10-24
Payer: MEDICARE

## 2023-10-25 RX ORDER — ATORVASTATIN CALCIUM 80 MG/1
TABLET, FILM COATED ORAL
Qty: 90 TABLET | Refills: 3 | Status: SHIPPED | OUTPATIENT
Start: 2023-10-25

## 2023-11-17 ENCOUNTER — HOSPITAL ENCOUNTER (OUTPATIENT)
Dept: CARDIOLOGY | Facility: CLINIC | Age: 73
Discharge: HOME | End: 2023-11-17
Payer: MEDICARE

## 2023-11-17 DIAGNOSIS — Z95.2 S/P AVR (AORTIC VALVE REPLACEMENT): ICD-10-CM

## 2023-11-17 DIAGNOSIS — Z95.1 S/P CABG X 1: ICD-10-CM

## 2023-11-17 DIAGNOSIS — I10 HTN (HYPERTENSION), BENIGN: ICD-10-CM

## 2023-11-17 DIAGNOSIS — I25.10 CORONARY ARTERY DISEASE INVOLVING NATIVE CORONARY ARTERY OF NATIVE HEART WITHOUT ANGINA PECTORIS: ICD-10-CM

## 2023-11-17 PROCEDURE — 93000 EKG 12-LEAD: ICD-10-PCS | Mod: S$GLB,,, | Performed by: INTERNAL MEDICINE

## 2023-11-17 PROCEDURE — 93000 ELECTROCARDIOGRAM COMPLETE: CPT | Mod: S$GLB,,, | Performed by: INTERNAL MEDICINE

## 2023-11-17 NOTE — PROGRESS NOTES
Your results look fine and do not require any change in treatment.     Please contact me if you have any additional concerns.    Sincerely,    Joo Villalba

## 2023-11-19 NOTE — PROGRESS NOTES
Subjective:   Patient ID:  Bryant King is a 73 y.o. male who presents for follow-up of CVD    HPI: The patient is here for CAD.The patient is here for valvular heart disease.     The patient has no chest pain, SOB, TIA, palpitations, syncope or pre-syncope.Patient currently exercises a few times per week golf.BP is 120s -130s. He could not tolerate metformin with diarrhea and Farxiga too expensive        Review of Systems   Constitutional: Negative for chills, decreased appetite, diaphoresis, fever, malaise/fatigue, night sweats, weight gain and weight loss.   HENT:  Negative for congestion, hoarse voice, nosebleeds, sore throat and tinnitus.    Eyes:  Negative for blurred vision, double vision, vision loss in left eye, vision loss in right eye, visual disturbance and visual halos.   Cardiovascular:  Negative for chest pain, claudication, cyanosis, dyspnea on exertion, irregular heartbeat, leg swelling, near-syncope, orthopnea, palpitations, paroxysmal nocturnal dyspnea and syncope.   Respiratory:  Negative for cough, hemoptysis, shortness of breath, sleep disturbances due to breathing, snoring, sputum production and wheezing.    Endocrine: Negative for cold intolerance, heat intolerance, polydipsia, polyphagia and polyuria.   Hematologic/Lymphatic: Negative for adenopathy and bleeding problem. Does not bruise/bleed easily.   Skin:  Negative for color change, dry skin, flushing, itching, nail changes, poor wound healing, rash, skin cancer, suspicious lesions and unusual hair distribution.   Musculoskeletal:  Negative for arthritis, back pain, falls, gout, joint pain, joint swelling, muscle cramps, muscle weakness, myalgias and stiffness.   Gastrointestinal:  Negative for abdominal pain, anorexia, change in bowel habit, constipation, diarrhea, dysphagia, heartburn, hematemesis, hematochezia, melena and vomiting.   Genitourinary:  Negative for decreased libido, dysuria, hematuria, hesitancy and urgency.    Neurological:  Negative for excessive daytime sleepiness, dizziness, focal weakness, headaches, light-headedness, loss of balance, numbness, paresthesias, seizures, sensory change, tremors, vertigo and weakness.   Psychiatric/Behavioral:  Negative for altered mental status, depression, hallucinations, memory loss, substance abuse and suicidal ideas. The patient does not have insomnia and is not nervous/anxious.    Allergic/Immunologic: Negative for environmental allergies and hives.       Objective: There were no vitals taken for this visit.     Physical Exam    Assessment:     1. Coronary artery disease involving native coronary artery of native heart without angina pectoris    2. HTN (hypertension), benign    3. Dyslipidemia    4. S/P CABG x 1    5. S/P AVR (aortic valve replacement)    6. Overweight (BMI 25.0-29.9)    7. Type 2 diabetes mellitus without complication, without long-term current use of insulin    8. Back stiffness    9. Back tightness    Last outside LDL only 39 but A1C 7.7    Plan:   Discussed diet , achieving and maintaining ideal body weight, and exercise.   We reviewed meds in detail.  Reassured-Discussed goals, options, plan.  Omega-3 > 800 mg/d combined EPA/DHA.  Goal BP< 130/80.  Goal LDL < 70.  NTG should be refilled every 8-9 months.  We have discussed the need for endocarditis prophylaxis.     Jardiance or Farxiga 10 mg      Diagnoses and all orders for this visit:    Coronary artery disease involving native coronary artery of native heart without angina pectoris  -     empagliflozin (JARDIANCE) 10 mg tablet; Take 1 tablet (10 mg total) by mouth once daily. Or Farxiga 10 mg if better pricing for him  -     Lipid Panel; Future; Expected date: 11/20/2024  -     Comprehensive Metabolic Panel; Future; Expected date: 11/20/2024  -     TSH; Future; Expected date: 11/20/2024  -     CBC Auto Differential; Future; Expected date: 11/20/2024  -     EKG 12-lead; Future; Expected date: 11/20/2024  -      Echo; Future; Expected date: 11/20/2024    HTN (hypertension), benign  -     empagliflozin (JARDIANCE) 10 mg tablet; Take 1 tablet (10 mg total) by mouth once daily. Or Farxiga 10 mg if better pricing for him  -     Comprehensive Metabolic Panel; Future; Expected date: 11/20/2024  -     TSH; Future; Expected date: 11/20/2024  -     EKG 12-lead; Future; Expected date: 11/20/2024    Dyslipidemia  -     Lipid Panel; Future; Expected date: 11/20/2024  -     Comprehensive Metabolic Panel; Future; Expected date: 11/20/2024  -     TSH; Future; Expected date: 11/20/2024    S/P CABG x 1  -     empagliflozin (JARDIANCE) 10 mg tablet; Take 1 tablet (10 mg total) by mouth once daily. Or Farxiga 10 mg if better pricing for him  -     CBC Auto Differential; Future; Expected date: 11/20/2024  -     EKG 12-lead; Future; Expected date: 11/20/2024    S/P AVR (aortic valve replacement)  -     CBC Auto Differential; Future; Expected date: 11/20/2024  -     EKG 12-lead; Future; Expected date: 11/20/2024  -     Echo; Future; Expected date: 11/20/2024    Overweight (BMI 25.0-29.9)  -     TSH; Future; Expected date: 11/20/2024    Type 2 diabetes mellitus without complication, without long-term current use of insulin  -     empagliflozin (JARDIANCE) 10 mg tablet; Take 1 tablet (10 mg total) by mouth once daily. Or Farxiga 10 mg if better pricing for him  -     Hemoglobin A1C; Future; Expected date: 11/20/2024    Back stiffness    Back tightness            Follow up in about 1 year (around 11/20/2024) for with labs ECG and CFD Deejay Villalba to read.

## 2023-11-20 ENCOUNTER — OFFICE VISIT (OUTPATIENT)
Dept: CARDIOLOGY | Facility: CLINIC | Age: 73
End: 2023-11-20
Payer: MEDICARE

## 2023-11-20 DIAGNOSIS — E66.3 OVERWEIGHT (BMI 25.0-29.9): ICD-10-CM

## 2023-11-20 DIAGNOSIS — E78.5 DYSLIPIDEMIA: ICD-10-CM

## 2023-11-20 DIAGNOSIS — M53.80 BACK TIGHTNESS: ICD-10-CM

## 2023-11-20 DIAGNOSIS — Z95.2 S/P AVR (AORTIC VALVE REPLACEMENT): ICD-10-CM

## 2023-11-20 DIAGNOSIS — I25.10 CORONARY ARTERY DISEASE INVOLVING NATIVE CORONARY ARTERY OF NATIVE HEART WITHOUT ANGINA PECTORIS: Primary | ICD-10-CM

## 2023-11-20 DIAGNOSIS — I10 HTN (HYPERTENSION), BENIGN: ICD-10-CM

## 2023-11-20 DIAGNOSIS — Z95.1 S/P CABG X 1: ICD-10-CM

## 2023-11-20 DIAGNOSIS — E11.9 TYPE 2 DIABETES MELLITUS WITHOUT COMPLICATION, WITHOUT LONG-TERM CURRENT USE OF INSULIN: ICD-10-CM

## 2023-11-20 DIAGNOSIS — M25.69 BACK STIFFNESS: ICD-10-CM

## 2023-11-20 PROCEDURE — 99213 PR OFFICE/OUTPT VISIT, EST, LEVL III, 20-29 MIN: ICD-10-PCS | Mod: 95,,, | Performed by: INTERNAL MEDICINE

## 2023-11-20 PROCEDURE — 1159F PR MEDICATION LIST DOCUMENTED IN MEDICAL RECORD: ICD-10-PCS | Mod: CPTII,95,, | Performed by: INTERNAL MEDICINE

## 2023-11-20 PROCEDURE — 1160F PR REVIEW ALL MEDS BY PRESCRIBER/CLIN PHARMACIST DOCUMENTED: ICD-10-PCS | Mod: CPTII,95,, | Performed by: INTERNAL MEDICINE

## 2023-11-20 PROCEDURE — 1159F MED LIST DOCD IN RCRD: CPT | Mod: CPTII,95,, | Performed by: INTERNAL MEDICINE

## 2023-11-20 PROCEDURE — 1160F RVW MEDS BY RX/DR IN RCRD: CPT | Mod: CPTII,95,, | Performed by: INTERNAL MEDICINE

## 2023-11-20 PROCEDURE — 99213 OFFICE O/P EST LOW 20 MIN: CPT | Mod: 95,,, | Performed by: INTERNAL MEDICINE

## 2023-11-20 NOTE — PATIENT INSTRUCTIONS
Discussed diet , achieving and maintaining ideal body weight, and exercise.   We reviewed meds in detail.  Reassured-Discussed goals, options, plan.  Omega-3 > 800 mg/d combined EPA/DHA.  Goal BP< 130/80.  Goal LDL < 70.  NTG should be refilled every 8-9 months.  We have discussed the need for endocarditis prophylaxis.  Jardiance or Farxiga 10 mg

## 2023-12-07 DIAGNOSIS — I25.10 CORONARY ARTERY DISEASE INVOLVING NATIVE CORONARY ARTERY OF NATIVE HEART WITHOUT ANGINA PECTORIS: ICD-10-CM

## 2023-12-07 DIAGNOSIS — I10 HTN (HYPERTENSION), BENIGN: ICD-10-CM

## 2023-12-07 DIAGNOSIS — E78.5 DYSLIPIDEMIA: ICD-10-CM

## 2023-12-07 DIAGNOSIS — E11.9 TYPE 2 DIABETES MELLITUS WITHOUT COMPLICATION, WITHOUT LONG-TERM CURRENT USE OF INSULIN: ICD-10-CM

## 2023-12-07 DIAGNOSIS — Z95.1 S/P CABG X 1: ICD-10-CM

## 2023-12-07 RX ORDER — EZETIMIBE 10 MG/1
TABLET ORAL
Qty: 90 TABLET | Refills: 3 | Status: SHIPPED | OUTPATIENT
Start: 2023-12-07

## 2023-12-07 NOTE — TELEPHONE ENCOUNTER
Medication refused due to failing protocol.    Requested Prescriptions   Pending Prescriptions Disp Refills    empagliflozin (JARDIANCE) 10 mg tablet 90 tablet 3     Sig: Take 1 tablet (10 mg total) by mouth once daily.       Antihyperglycemics Protocol Failed - 12/7/2023  2:52 PM        Failed - Normal creatinine in past 6 months     Lab Results   Component Value Date    CREATININE 0.9 12/15/2022              Failed - Had HBA1C in past 6 months     Hemoglobin A1C   Date Value Ref Range Status   12/15/2022 7.4 (H) 4.0 - 5.6 % Final     Comment:     ADA Screening Guidelines:  5.7-6.4%  Consistent with prediabetes  >or=6.5%  Consistent with diabetes    High levels of fetal hemoglobin interfere with the HbA1C  assay. Heterozygous hemoglobin variants (HbS, HgC, etc)do  not significantly interfere with this assay.   However, presence of multiple variants may affect accuracy.     07/26/2021 5.7 (H) 4.0 - 5.6 % Final     Comment:     ADA Screening Guidelines:  5.7-6.4%  Consistent with prediabetes  >or=6.5%  Consistent with diabetes    High levels of fetal hemoglobin interfere with the HbA1C  assay. Heterozygous hemoglobin variants (HbS, HgC, etc)do  not significantly interfere with this assay.   However, presence of multiple variants may affect accuracy.     04/20/2021 7.3 (H) 4.0 - 5.6 % Final     Comment:     ADA Screening Guidelines:  5.7-6.4%  Consistent with prediabetes  >or=6.5%  Consistent with diabetes    High levels of fetal hemoglobin interfere with the HbA1C  assay. Heterozygous hemoglobin variants (HbS, HgC, etc)do  not significantly interfere with this assay.   However, presence of multiple variants may affect accuracy.               Passed - Recent visit in the past year

## 2023-12-07 NOTE — TELEPHONE ENCOUNTER
Bryant WILCOX Staff (supporting Joo Villalba MD)3 hours ago (11:01 AM)     EDGAR Massey,  was the new prescription sent to [facility] for the generic for Jardiance 10mg tab (empagliflozin)?  Haven't  had any notification from [facility] that they got it.  Can you please check to if it was called in?  Thanks,  KELSEY Renteria10 days ago     MF  We'll be happy to - We will send it in tomorrow. Thank you !        Bryant WILCOX Staff (supporting Joo Villalba MD)10 days ago     EDGAR Villalba ordered empagliflozin (jardiance) at [facility], price $141.00 for 90 day supply.  I called [facility] ([facility]) and they have it for $131.00 - 90day supply.  Can you please order this medication thru [facility]?  Also,  do I continue to take glimeperide as well as the empagliflozin?       Thank You,   Tyrell VELAZCO

## 2023-12-08 NOTE — TELEPHONE ENCOUNTER
----- Message from Joo Villalba MD sent at 12/8/2023 10:24 AM CST -----  Regarding: FW: Farxiga Jardiance  Yes but Jardiance and Farziga are almost identical,CJL  ----- Message -----  From: Violeta Yip, RN  Sent: 12/8/2023  10:21 AM CST  To: Joo Villalba MD; Violeta Yip, RN  Subject: Farxiga Jardiance                                I had sent the prescription to refill Jardiance at [facility] to another provider as you were unreachable for a while. It was denied due to pt being on Farxiga already. Farxiga was ordered by his endocrinologist for DM. I spoke w. pt and he stated that he does not take Farxiga anymore, takes (glyperide?).    Please advise on Ok to discontinue Farxiga (and refill Jardiance)?

## 2024-07-18 ENCOUNTER — PATIENT MESSAGE (OUTPATIENT)
Dept: CARDIOLOGY | Facility: CLINIC | Age: 74
End: 2024-07-18
Payer: MEDICARE

## 2024-08-26 DIAGNOSIS — I25.10 CORONARY ARTERY DISEASE INVOLVING NATIVE CORONARY ARTERY OF NATIVE HEART WITHOUT ANGINA PECTORIS: ICD-10-CM

## 2024-08-26 DIAGNOSIS — I10 HTN (HYPERTENSION), BENIGN: ICD-10-CM

## 2024-08-26 RX ORDER — CARVEDILOL 12.5 MG/1
TABLET ORAL
Qty: 180 TABLET | Refills: 3 | Status: SHIPPED | OUTPATIENT
Start: 2024-08-26

## 2024-09-19 DIAGNOSIS — I25.10 CORONARY ARTERY DISEASE INVOLVING NATIVE CORONARY ARTERY OF NATIVE HEART WITHOUT ANGINA PECTORIS: ICD-10-CM

## 2024-09-19 DIAGNOSIS — I10 HTN (HYPERTENSION), BENIGN: ICD-10-CM

## 2024-09-19 DIAGNOSIS — Z95.1 S/P CABG X 1: ICD-10-CM

## 2024-09-19 DIAGNOSIS — E11.9 TYPE 2 DIABETES MELLITUS WITHOUT COMPLICATION, WITHOUT LONG-TERM CURRENT USE OF INSULIN: ICD-10-CM

## 2024-09-19 RX ORDER — EMPAGLIFLOZIN 10 MG/1
10 TABLET, FILM COATED ORAL
Qty: 90 TABLET | Refills: 3 | Status: SHIPPED | OUTPATIENT
Start: 2024-09-19

## 2024-10-30 RX ORDER — ATORVASTATIN CALCIUM 80 MG/1
TABLET, FILM COATED ORAL
Qty: 90 TABLET | Refills: 3 | Status: SHIPPED | OUTPATIENT
Start: 2024-10-30

## 2024-11-04 ENCOUNTER — OFFICE VISIT (OUTPATIENT)
Dept: URGENT CARE | Facility: CLINIC | Age: 74
End: 2024-11-04
Payer: MEDICARE

## 2024-11-04 ENCOUNTER — PATIENT MESSAGE (OUTPATIENT)
Dept: CARDIOLOGY | Facility: CLINIC | Age: 74
End: 2024-11-04
Payer: MEDICARE

## 2024-11-04 VITALS
BODY MASS INDEX: 28.95 KG/M2 | RESPIRATION RATE: 18 BRPM | DIASTOLIC BLOOD PRESSURE: 70 MMHG | HEIGHT: 68 IN | TEMPERATURE: 98 F | WEIGHT: 191 LBS | OXYGEN SATURATION: 98 % | SYSTOLIC BLOOD PRESSURE: 120 MMHG | HEART RATE: 51 BPM

## 2024-11-04 DIAGNOSIS — H00.012 HORDEOLUM EXTERNUM OF RIGHT LOWER EYELID: Primary | ICD-10-CM

## 2024-11-04 DIAGNOSIS — R00.1 BRADYCARDIA: ICD-10-CM

## 2024-11-04 NOTE — PATIENT INSTRUCTIONS
If you begin to have shortness of breath, chest, abdomen, back pain, nausea, sudden fatigue, dizziness or weakness, please call 911 and go immediately to the emergency department as we discussed.    If your symptoms of low heart rate persist, please contact your cardiologist for follow-up.    What care is needed at home?   Use a warm compress to the eye for 15 minutes 4 times a day to help drainage and ease discomfort. The compress should be as warm as you can tolerate comfortably. Be careful not to burn your eye.  Do not squeeze or try to drain or pop a stye. This can infect the whole eyelid or other parts of the eye.  Be sure to wash your hands before and after touching your eye.  Wear glasses instead of contact lens while you have a stye. Ask your doctor if you need to sterilize your contact lens or get new ones after you have a stye.  Dont wear eye makeup when you have a stye.

## 2024-11-04 NOTE — PROGRESS NOTES
"Subjective:      Patient ID: Byrant King is a 74 y.o. male.    Vitals:  height is 5' 8" (1.727 m) and weight is 86.6 kg (191 lb). His oral temperature is 97.6 °F (36.4 °C). His blood pressure is 120/70 and his pulse is 51 (abnormal). His respiration is 18 and oxygen saturation is 98%.     Chief Complaint: Eye Problem    This is a 74 y.o. male who presents today with a chief complaint of eye irritation onset 1 week. Redness present on right lower eyelid and on eyeball. No known injury mechanism.     Provider note begins below:    Patient comes to the clinic with 1 week of right lower lid eye pain.  Pain is minimal but worsened over the last few days.  No ophthalmic discharge.  No scleral injection.  No photophobia.  No discharge.  No loss of visual acuity.  No injury or trauma to the eye.    Eye Problem   The right eye is affected. This is a new problem. The current episode started in the past 7 days. The problem occurs constantly. The problem has been unchanged. There was no injury mechanism. The pain is at a severity of 2/10. The pain is mild. There is No known exposure to pink eye. He Does not wear contacts. Associated symptoms include eye redness. Pertinent negatives include no blurred vision, eye discharge, fever, foreign body sensation or itching. He has tried nothing for the symptoms.       Constitution: Negative for fever.   Eyes:  Positive for eye redness. Negative for eye discharge, eye itching and blurred vision.      Objective:     Physical Exam   Constitutional: He is oriented to person, place, and time. He appears well-developed.   HENT:   Head: Normocephalic and atraumatic.   Ears:   Right Ear: External ear normal.   Left Ear: External ear normal.   Nose: Nose normal.   Mouth/Throat: Oropharynx is clear and moist.   Eyes: Conjunctivae, EOM and lids are normal. Pupils are equal, round, and reactive to light. Right eye exhibits hordeolum.       Neck: Trachea normal and phonation normal. Neck " supple.   Musculoskeletal: Normal range of motion.         General: Normal range of motion.   Neurological: He is alert and oriented to person, place, and time.   Skin: Skin is warm, dry and intact.   Psychiatric: His speech is normal and behavior is normal. Judgment and thought content normal.   Nursing note and vitals reviewed.  No results found.      Assessment:     1. Hordeolum externum of right lower eyelid    2. Bradycardia        Plan:       Hordeolum externum of right lower eyelid    Bradycardia      Patient Instructions   If you begin to have shortness of breath, chest, abdomen, back pain, nausea, sudden fatigue, dizziness or weakness, please call 911 and go immediately to the emergency department as we discussed.    If your symptoms of low heart rate persist, please contact your cardiologist for follow-up.    What care is needed at home?   Use a warm compress to the eye for 15 minutes 4 times a day to help drainage and ease discomfort. The compress should be as warm as you can tolerate comfortably. Be careful not to burn your eye.  Do not squeeze or try to drain or pop a stye. This can infect the whole eyelid or other parts of the eye.  Be sure to wash your hands before and after touching your eye.  Wear glasses instead of contact lens while you have a stye. Ask your doctor if you need to sterilize your contact lens or get new ones after you have a stye.  Dont wear eye makeup when you have a stye.

## 2024-11-04 NOTE — TELEPHONE ENCOUNTER
Pt on carvedilol. Denies c/o/fatigue. No BP readings at home. Encouraged logging some w. HR. Scheduled 11/6 w. Ms Soniaclarissach + note for EKG needed. Told pt to arrive 30 mn early and bring bp/hr readings and pill bottles. He verbalized understanding and agreed to date/time of appointment(s).

## 2024-11-05 ENCOUNTER — TELEPHONE (OUTPATIENT)
Dept: CARDIOLOGY | Facility: CLINIC | Age: 74
End: 2024-11-05
Payer: MEDICARE

## 2024-11-05 DIAGNOSIS — R00.2 PALPITATIONS: Primary | ICD-10-CM

## 2024-11-05 PROBLEM — R00.1 BRADYCARDIA: Status: ACTIVE | Noted: 2024-11-05

## 2024-11-05 NOTE — PROGRESS NOTES
General Cardiology Clinic Note  Last Clinic Visit: 11/20/23 with Dr. Villalba   General Cardiologist: Dr. Villalba    HPI:     Bryant King is a 74 y.o. , who presents for annual visit, low HR.    PROBLEM LIST:  HTN  CAD  - s/p CABG (2012)  Aortic stenosis s/p bioprosthetic AVR (2012)    Interval HPI:   2 days ago, he visited urgent care for a stye and was told his HR was 50 on multiple readings, and to follow up with his cardiologist. He was asymptomatic and denies any symptoms of dizziness, lightheadedness/syncope, palpitations, weakness, etc. He overall continues to do very well from a cardiovascular standpoint. He is generally active, walks for exercise, and golfs. Denies chest pain/pressure/tightness/discomfort, new/worsening AMAYA, sustained palpitations, PND/orthopnea, edema, claudication, or changes in exertional capacity. Weight stable. BP at urgent care 2 days ago was 120/70. Last FLP done on 12/15/22: At that time, LDL was 44. He is currently on lipitor 80mg + zetia daily.      11/2023 HPI (Dr. Villalba)  The patient is here for CAD.The patient is here for valvular heart disease. The patient has no chest pain, SOB, TIA, palpitations, syncope or pre-syncope. Patient currently exercises a few times per week golf. BP is 120s -130s. He could not tolerate metformin with diarrhea and Farxiga too expensive.     6/2022 HPI (Dr. Villalba)  Patient is here for valvular heart disease.The patient is here for CAD.  The patient has no chest pain, SOB, TIA, palpitations, syncope or pre-syncope.Patient currently exercises a few times per week.      Surgical: Reviewed, as below.  Family: Reviewed, as below.   Social: Reviewed, as below.    ROS:    Pertinent ROS included in HPI and below.  PMH:     Past Medical History:   Diagnosis Date    Coronary artery disease     Hyperlipidemia     Hypertension     Migraines     Type 2 diabetes mellitus without complication, without long-term current use of insulin      Past Surgical  History:   Procedure Laterality Date    AORTIC VALVE REPLACEMENT      back fusion      CORONARY ARTERY BYPASS GRAFT  02/08/2012    HERNIA REPAIR      umbilical hernia repair with possible mesh    laparoscopic hiatal hernia repair with mesh      PATELLA FRACTURE SURGERY      TONSILLECTOMY       Allergies:   Review of patient's allergies indicates:  No Known Allergies  Medications:     Current Outpatient Medications on File Prior to Visit   Medication Sig Dispense Refill    ALPRAZolam (XANAX) 0.5 MG tablet Take 0.5 mg by mouth 2 (two) times daily as needed.       amoxicillin (AMOXIL) 500 MG Tab TAKE 4 TABLETS (2000 MG) 1 HOUR PRIOR TO DENTAL WORK AS NEEDED 12 tablet 10    aspirin (ECOTRIN) 81 MG EC tablet Take 1 tablet (81 mg total) by mouth once daily. 30 tablet 0    atorvastatin (LIPITOR) 80 MG tablet TAKE 1 TABLET EVERY DAY 90 tablet 3    carvediloL (COREG) 12.5 MG tablet TAKE 1 TABLET TWICE DAILY OR AS DIRECTED 180 tablet 3    ciclopirox (LOPROX) 0.77 % Crea Apply topically.      ezetimibe (ZETIA) 10 mg tablet TAKE 1 TABLET EVERY DAY 90 tablet 3    fluticasone propionate (FLONASE) 50 mcg/actuation nasal spray 1 spray by Nasal route daily as needed.      gabapentin (NEURONTIN) 300 MG capsule TAKE 2 CAPSULES BY MOUTH 3 TIMES A DAY  2    glimepiride (AMARYL) 2 MG tablet TAKE 1 TAB PO IN THE AM, TAKE 2 TABS PO IN THE PM      JARDIANCE 10 mg tablet TAKE 1 TABLET EVERY DAY 90 tablet 3    nitroGLYCERIN (NITROSTAT) 0.4 MG SL tablet DISSOLVE 1 TABLET UNDER THE TONGUE EVERY 5 MINUTES AS NEEDED FOR CHEST PAIN AS DIRECTED 25 tablet 10    omega-3 fatty acids/fish oil (FISH OIL-OMEGA-3 FATTY ACIDS) 300-1,000 mg capsule Take by mouth once daily.      rizatriptan (MAXALT) 10 MG tablet Take 10 mg by mouth as needed for Migraine.      [DISCONTINUED] cetirizine (ZYRTEC) 10 MG tablet Take 10 mg by mouth daily as needed.       [DISCONTINUED] metFORMIN (GLUCOPHAGE) 500 MG tablet Take 1 tablet (500 mg total) by mouth 2 (two) times daily  "with meals. 180 tablet 3     Current Facility-Administered Medications on File Prior to Visit   Medication Dose Route Frequency Provider Last Rate Last Admin    acetaminophen tablet 650 mg  650 mg Oral Once PRN Kurtis Chaidez MD        albuterol inhaler 2 puff  2 puff Inhalation Q20 Min PRN Kurtis Chaidez MD        diphenhydrAMINE injection 25 mg  25 mg Intravenous Once PRN Kurtis Chaidez MD        EPINEPHrine (EPIPEN) 0.3 mg/0.3 mL pen injection 0.3 mg  0.3 mg Intramuscular PRN Kurtis Chaidez MD        methylPREDNISolone sodium succinate injection 40 mg  40 mg Intravenous Once PRN Kurtis Chaidez MD        ondansetron disintegrating tablet 4 mg  4 mg Oral Once PRN Kurtis Chaidez MD        sodium chloride 0.9% 500 mL flush bag   Intravenous PRN Kurtis Chaidez MD        sodium chloride 0.9% flush 10 mL  10 mL Intravenous PRN Kurtis Chaidez MD         Social History:     Social History     Tobacco Use    Smoking status: Never     Passive exposure: Never    Smokeless tobacco: Never   Substance Use Topics    Alcohol use: No     Family History:     Family History   Problem Relation Name Age of Onset    Hypertension Mother      Hypertension Father      Heart disease Brother      Heart attack Neg Hx       Physical Exam:   BP (!) 143/77   Pulse (!) 52   Ht 5' 8" (1.727 m)   Wt 83.2 kg (183 lb 6.8 oz)   SpO2 98%   BMI 27.89 kg/m²      Physical Exam  Constitutional:       Appearance: Normal appearance.   HENT:      Head: Normocephalic.      Mouth/Throat:      Mouth: Mucous membranes are moist.   Neck:      Vascular: No carotid bruit.   Cardiovascular:      Rate and Rhythm: Regular rhythm. Bradycardia present.      Pulses:           Carotid pulses are 2+ on the right side and 2+ on the left side.       Radial pulses are 2+ on the right side and 2+ on the left side.      Heart sounds: Normal heart sounds. No murmur heard.  Pulmonary:      Effort: Pulmonary effort is normal.      " Breath sounds: Normal breath sounds.   Musculoskeletal:      Right lower leg: No edema.      Left lower leg: No edema.   Skin:     General: Skin is warm and dry.   Neurological:      Mental Status: He is alert and oriented to person, place, and time.          Labs:     Blood Tests:  Lab Results   Component Value Date    BNP 16 12/15/2022     12/15/2022    K 4.3 12/15/2022     12/15/2022    CO2 26 12/15/2022    BUN 10 12/15/2022    CREATININE 0.9 12/15/2022     (H) 12/15/2022    HGBA1C 7.4 (H) 12/15/2022    MG 2.4 06/29/2017    AST 25 12/15/2022    ALT 40 12/15/2022    ALBUMIN 4.1 12/15/2022    PROT 7.1 12/15/2022    BILITOT 1.0 12/15/2022    WBC 9.52 06/29/2017    HGB 15.1 06/29/2017    HCT 45.0 06/29/2017    MCV 95 06/29/2017     06/29/2017    TSH 2.943 12/15/2022       Lab Results   Component Value Date    CHOL 98 (L) 12/15/2022    HDL 40 12/15/2022    TRIG 66 12/15/2022       Lab Results   Component Value Date    LDLCALC 44.8 (L) 12/15/2022       Lab Results   Component Value Date    TSH 2.943 12/15/2022       Lab Results   Component Value Date    HGBA1C 7.4 (H) 12/15/2022         Imaging:     Echocardiogram  None    Stress testing  LEXII 6/2022  The test was stopped because the patient experienced fatigue.  There were no arrhythmias during stress.  The patient's exercise capacity was average.  The ECG portion of this study is negative for myocardial ischemia.  The left ventricle is normal in size with concentric remodeling and normal systolic function.  The estimated ejection fraction is 63%.  Indeterminate left ventricular diastolic function.  Normal right ventricular size with normal right ventricular systolic function.  Mild right atrial enlargement.  There is a bioprosthetic aortic valve present. There is no aortic insufficiency present.  The aortic valve mean gradient is 7 mmHg with a dimensionless index of 0.57.  Mild mitral regurgitation.  Mild tricuspid regurgitation.  Normal  central venous pressure (3 mmHg).  The estimated PA systolic pressure is 32 mmHg.  The stress echo portion of this study is negative for myocardial ischemia.    LEXII 2019  Normal left ventricular systolic function. The estimated ejection fraction is 60%  Normal right ventricular systolic function.  Normal LV diastolic function.  There is a normally functioning bioprosthetic aortic valve present.  The estimated PA systolic pressure is 24 mm Hg  Normal central venous pressure (3 mm Hg).  There were no arrhythmias during stress.  The patient's exercise capacity was normal (11 METs).  The EKG portion of this study is negative for myocardial ischemia.  The stress echo portion of this study is negative for myocardial ischemia.  Overall, this study is negative for myocardial ischemia.    LEXII 2017    1 - Normal left ventricular systolic function (EF 60-65%).     2 - Wall motion abnormalities.     3 - Normal left ventricular diastolic function.     4 - Right atrial enlargement.     5 - Normal right ventricular systolic function .     6 - Aortic valve prosthesis, effective prosthetic valve area corrected for BSA is 0.86 cm2.     7 - Mild to moderate mitral regurgitation.     8 - Trivial to mild tricuspid regurgitation.     9 - Trivial to mild pulmonic regurgitation.     10 - The estimated PA systolic pressure is 20 mmHg.   No evidence of stress induced myocardial ischemia.     Cath Lab  None    Other  None    EK/6/24 - Sinus bradycardia, 51 bpm   Left axis deviation   Nonspecific intraventricular conduction delay   Nonspecific T wave abnormality   Abnormal ECG   When compared with ECG of 2023 09:21, No significant change was found    23 - Sinus bradycardia, 54 bpm   Left axis deviation   Minimal voltage criteria for LVH, may be normal variant ( Carroll product )   Nonspecific T wave abnormality   Abnormal ECG   When compared with ECG of 2022 09:45,   No significant change was found      Assessment:     1. HTN (hypertension), benign    2. Coronary artery disease involving native coronary artery of native heart without angina pectoris    3. S/P CABG x 1    4. Dyslipidemia    5. S/P AVR (aortic valve replacement)    6. Bradycardia        Plan:     HTN (hypertension), benign  BP well-controlled. Continue current medication regimen. Encourage low-sodium diet.    Coronary artery disease involving native coronary artery of native heart without angina pectoris  S/P CABG x 1  Stable. No anginal symptoms. Continue current medication regimen.    Dyslipidemia  LDL previously well-controlled, will obtain updated FLP. Continue high-intensity statin + zetia.    S/P AVR (aortic valve replacement)  Stable, no concerning symptoms. No murmur audible on exam.  Bioprosthetic valve now > 10 years out from surgery and warrants annual echo surveillance. Has upcoming TTE order placed by Dr. Villalba.    Bradycardia  EKG last year sinus bradycardia at 54 bpm. EKG today unchanged from previous; sinus caryl with no e/o high grade heart block or other concerning arrhythmia.   Review of vitals flowsheet over the years shows HR is typically in 50s-60s.  He is asymptomatic (and always has been) with this heart rate. Reassurance given.  Discussed that if this ever became symptomatic, could back off of coreg, but no changes made today.      Signed:  Lauren Vlosich, PA-C [facility]     11/6/2024     Follow-up:     Future Appointments   Date Time Provider Department Center   11/13/2024  7:00 AM LAB, APPOINTMENT [facility] LAB VNP [facility]   11/13/2024  7:15 AM ECHO, [facility] NOM ECHOSTR [address]

## 2024-11-06 ENCOUNTER — OFFICE VISIT (OUTPATIENT)
Dept: CARDIOLOGY | Facility: CLINIC | Age: 74
End: 2024-11-06
Payer: MEDICARE

## 2024-11-06 VITALS
WEIGHT: 183.44 LBS | HEART RATE: 52 BPM | OXYGEN SATURATION: 98 % | DIASTOLIC BLOOD PRESSURE: 77 MMHG | SYSTOLIC BLOOD PRESSURE: 143 MMHG | HEIGHT: 68 IN | BODY MASS INDEX: 27.8 KG/M2

## 2024-11-06 DIAGNOSIS — E78.5 DYSLIPIDEMIA: ICD-10-CM

## 2024-11-06 DIAGNOSIS — I10 HTN (HYPERTENSION), BENIGN: Primary | ICD-10-CM

## 2024-11-06 DIAGNOSIS — I25.10 CORONARY ARTERY DISEASE INVOLVING NATIVE CORONARY ARTERY OF NATIVE HEART WITHOUT ANGINA PECTORIS: ICD-10-CM

## 2024-11-06 DIAGNOSIS — Z95.2 S/P AVR (AORTIC VALVE REPLACEMENT): ICD-10-CM

## 2024-11-06 DIAGNOSIS — Z95.1 S/P CABG X 1: ICD-10-CM

## 2024-11-06 DIAGNOSIS — R00.2 PALPITATIONS: ICD-10-CM

## 2024-11-06 DIAGNOSIS — R00.1 BRADYCARDIA: ICD-10-CM

## 2024-11-06 LAB
OHS QRS DURATION: 118 MS
OHS QTC CALCULATION: 418 MS

## 2024-11-06 PROCEDURE — 93010 ELECTROCARDIOGRAM REPORT: CPT | Mod: S$GLB,,, | Performed by: INTERNAL MEDICINE

## 2024-11-06 PROCEDURE — 93005 ELECTROCARDIOGRAM TRACING: CPT | Mod: S$GLB,,,

## 2024-11-06 PROCEDURE — 3077F SYST BP >= 140 MM HG: CPT | Mod: CPTII,S$GLB,,

## 2024-11-06 PROCEDURE — 99999 PR PBB SHADOW E&M-EST. PATIENT-LVL IV: CPT | Mod: PBBFAC,,,

## 2024-11-06 PROCEDURE — 1126F AMNT PAIN NOTED NONE PRSNT: CPT | Mod: CPTII,S$GLB,,

## 2024-11-06 PROCEDURE — 3288F FALL RISK ASSESSMENT DOCD: CPT | Mod: CPTII,S$GLB,,

## 2024-11-06 PROCEDURE — 3078F DIAST BP <80 MM HG: CPT | Mod: CPTII,S$GLB,,

## 2024-11-06 PROCEDURE — 1101F PT FALLS ASSESS-DOCD LE1/YR: CPT | Mod: CPTII,S$GLB,,

## 2024-11-06 PROCEDURE — 99214 OFFICE O/P EST MOD 30 MIN: CPT | Mod: S$GLB,,,

## 2024-11-06 PROCEDURE — 1159F MED LIST DOCD IN RCRD: CPT | Mod: CPTII,S$GLB,,

## 2024-11-06 RX ORDER — GLIMEPIRIDE 2 MG/1
TABLET ORAL
COMMUNITY

## 2024-11-06 RX ORDER — CICLOPIROX OLAMINE 7.7 MG/G
CREAM TOPICAL
COMMUNITY
Start: 2024-07-08

## 2024-11-13 ENCOUNTER — HOSPITAL ENCOUNTER (OUTPATIENT)
Dept: CARDIOLOGY | Facility: HOSPITAL | Age: 74
Discharge: HOME | End: 2024-11-13
Attending: INTERNAL MEDICINE
Payer: MEDICARE

## 2024-11-13 VITALS
HEART RATE: 53 BPM | DIASTOLIC BLOOD PRESSURE: 77 MMHG | WEIGHT: 183 LBS | BODY MASS INDEX: 27.74 KG/M2 | SYSTOLIC BLOOD PRESSURE: 143 MMHG | HEIGHT: 68 IN

## 2024-11-13 DIAGNOSIS — I25.10 CORONARY ARTERY DISEASE INVOLVING NATIVE CORONARY ARTERY OF NATIVE HEART WITHOUT ANGINA PECTORIS: ICD-10-CM

## 2024-11-13 DIAGNOSIS — Z95.2 S/P AVR (AORTIC VALVE REPLACEMENT): ICD-10-CM

## 2024-11-13 LAB
ASCENDING AORTA: 4.28 CM
AV AREA BY CONTINUOUS VTI: 3.1 CM2
AV INDEX (PROSTH): 0.71
AV LVOT MEAN GRADIENT: 1 MMHG
AV LVOT PEAK GRADIENT: 2 MMHG
AV MEAN GRADIENT: 3.2 MMHG
AV PEAK GRADIENT: 6.8 MMHG
AV VALVE AREA BY VELOCITY RATIO: 2.4 CM²
AV VALVE AREA: 3.2 CM2
AV VELOCITY RATIO: 0.54
BSA FOR ECHO PROCEDURE: 2 M2
CV ECHO LV RWT: 0.45 CM
DOP CALC AO PEAK VEL: 1.3 M/S
DOP CALC AO VTI: 31 CM
DOP CALC LVOT AREA: 4.5 CM2
DOP CALC LVOT DIAMETER: 2.4 CM
DOP CALC LVOT PEAK VEL: 0.7 M/S
DOP CALC LVOT STROKE VOLUME: 99.9 CM3
DOP CALCLVOT PEAK VEL VTI: 22.1 CM
E WAVE DECELERATION TIME: 214.02 MS
E/A RATIO: 0.73
E/E' RATIO: 6 M/S
ECHO EF ESTIMATED: 62 %
ECHO LV POSTERIOR WALL: 1 CM (ref 0.6–1.1)
EJECTION FRACTION: 63 %
FRACTIONAL SHORTENING: 31.8 % (ref 28–44)
INTERVENTRICULAR SEPTUM: 1 CM (ref 0.6–1.1)
IVC DIAMETER: 1.21 CM
IVRT: 125.59 MS
LA MAJOR: 5.28 CM
LA MINOR: 5.17 CM
LA WIDTH: 3.71 CM
LEFT ATRIUM SIZE: 3.91 CM
LEFT ATRIUM VOLUME INDEX MOD: 34.9 ML/M2
LEFT ATRIUM VOLUME INDEX: 32.7 ML/M2
LEFT ATRIUM VOLUME MOD: 68.69 ML
LEFT ATRIUM VOLUME: 64.42 CM3
LEFT INTERNAL DIMENSION IN SYSTOLE: 3 CM (ref 2.1–4)
LEFT VENTRICLE DIASTOLIC VOLUME INDEX: 44.92 ML/M2
LEFT VENTRICLE DIASTOLIC VOLUME: 88.49 ML
LEFT VENTRICLE MASS INDEX: 75 G/M2
LEFT VENTRICLE SYSTOLIC VOLUME INDEX: 17.1 ML/M2
LEFT VENTRICLE SYSTOLIC VOLUME: 33.66 ML
LEFT VENTRICULAR INTERNAL DIMENSION IN DIASTOLE: 4.4 CM (ref 3.5–6)
LEFT VENTRICULAR MASS: 147.8 G
LV LATERAL E/E' RATIO: 5.1
LV SEPTAL E/E' RATIO: 7.29
MV A" WAVE DURATION": 213.13 MS
MV PEAK A VEL: 0.7 M/S
MV PEAK E VEL: 0.51 M/S
OHS CV RV/LV RATIO: 0.7 CM
PISA TR MAX VEL: 2.31 M/S
PULM VEIN A" WAVE DURATION": 213.13 MS
PULM VEIN S/D RATIO: 1.25
PULMONIC VEIN PEAK A VELOCITY: 0.2 M/S
PV PEAK D VEL: 0.12 M/S
PV PEAK S VEL: 0.15 M/S
RA MAJOR: 4.56 CM
RA PRESSURE ESTIMATED: 3 MMHG
RA WIDTH: 3.25 CM
RIGHT ATRIAL AREA: 15.3 CM2
RIGHT VENTRICLE DIASTOLIC BASEL DIMENSION: 3.1 CM
RV TB RVSP: 5 MMHG
RV TISSUE DOPPLER FREE WALL SYSTOLIC VELOCITY 1 (APICAL 4 CHAMBER VIEW): 10.1 CM/S
SINUS: 4.01 CM
STJ: 3.31 CM
TDI LATERAL: 0.1 M/S
TDI SEPTAL: 0.07 M/S
TDI: 0.09 M/S
TR MAX PG: 21 MMHG
TRICUSPID ANNULAR PLANE SYSTOLIC EXCURSION: 1.91 CM
TV PEAK GRADIENT: 21 MMHG
TV REST PULMONARY ARTERY PRESSURE: 24 MMHG
Z-SCORE OF LEFT VENTRICULAR DIMENSION IN END DIASTOLE: -2.52
Z-SCORE OF LEFT VENTRICULAR DIMENSION IN END SYSTOLE: -1.17

## 2024-11-13 PROCEDURE — 93306 TTE W/DOPPLER COMPLETE: CPT

## 2024-11-13 PROCEDURE — 93306 TTE W/DOPPLER COMPLETE: CPT | Mod: 26,,, | Performed by: INTERNAL MEDICINE

## 2024-11-13 NOTE — PROGRESS NOTES
Your results look fine and do not require any change in treatment. Looks stable need to make sure BP < 130/80 mostly with dilated aorta.        Please contact me if you have any additional concerns.    Sincerely,    Joo Villalba

## 2024-11-14 ENCOUNTER — OFFICE VISIT (OUTPATIENT)
Dept: PAIN MEDICINE | Facility: CLINIC | Age: 74
End: 2024-11-14
Payer: MEDICARE

## 2024-11-14 VITALS
SYSTOLIC BLOOD PRESSURE: 125 MMHG | WEIGHT: 183 LBS | DIASTOLIC BLOOD PRESSURE: 78 MMHG | HEART RATE: 57 BPM | HEIGHT: 68 IN | BODY MASS INDEX: 27.74 KG/M2

## 2024-11-14 DIAGNOSIS — M54.9 DORSALGIA, UNSPECIFIED: ICD-10-CM

## 2024-11-14 DIAGNOSIS — M96.1 POSTLAMINECTOMY SYNDROME OF LUMBAR REGION: Primary | ICD-10-CM

## 2024-11-14 DIAGNOSIS — M54.16 LUMBAR RADICULOPATHY: ICD-10-CM

## 2024-11-14 DIAGNOSIS — R29.898 WEAKNESS OF HIP: ICD-10-CM

## 2024-11-14 PROCEDURE — 3074F SYST BP LT 130 MM HG: CPT | Mod: CPTII,S$GLB,, | Performed by: STUDENT IN AN ORGANIZED HEALTH CARE EDUCATION/TRAINING PROGRAM

## 2024-11-14 PROCEDURE — 1125F AMNT PAIN NOTED PAIN PRSNT: CPT | Mod: CPTII,S$GLB,, | Performed by: STUDENT IN AN ORGANIZED HEALTH CARE EDUCATION/TRAINING PROGRAM

## 2024-11-14 PROCEDURE — 3288F FALL RISK ASSESSMENT DOCD: CPT | Mod: CPTII,S$GLB,, | Performed by: STUDENT IN AN ORGANIZED HEALTH CARE EDUCATION/TRAINING PROGRAM

## 2024-11-14 PROCEDURE — 99204 OFFICE O/P NEW MOD 45 MIN: CPT | Mod: S$GLB,,, | Performed by: STUDENT IN AN ORGANIZED HEALTH CARE EDUCATION/TRAINING PROGRAM

## 2024-11-14 PROCEDURE — 1101F PT FALLS ASSESS-DOCD LE1/YR: CPT | Mod: CPTII,S$GLB,, | Performed by: STUDENT IN AN ORGANIZED HEALTH CARE EDUCATION/TRAINING PROGRAM

## 2024-11-14 PROCEDURE — 3078F DIAST BP <80 MM HG: CPT | Mod: CPTII,S$GLB,, | Performed by: STUDENT IN AN ORGANIZED HEALTH CARE EDUCATION/TRAINING PROGRAM

## 2024-11-14 PROCEDURE — 1159F MED LIST DOCD IN RCRD: CPT | Mod: CPTII,S$GLB,, | Performed by: STUDENT IN AN ORGANIZED HEALTH CARE EDUCATION/TRAINING PROGRAM

## 2024-11-14 PROCEDURE — 99999 PR PBB SHADOW E&M-EST. PATIENT-LVL IV: CPT | Mod: PBBFAC,,, | Performed by: STUDENT IN AN ORGANIZED HEALTH CARE EDUCATION/TRAINING PROGRAM

## 2024-11-14 NOTE — PROGRESS NOTES
Chronic Pain - New Consult    Referring Physician: No ref. provider found    Date: 11/14/2024     Re: Bryant King  MR#: [id number]  YOB: 1950  Age: 74 y.o.    Chief Complaint:   Chief Complaint   Patient presents with    Back Pain     **This note is dictated using the M*Modal Fluency Direct word recognition program. There are word recognition mistakes that are occasionally missed on review.**    ASSESSMENT: 74 y.o. year old male with back and leg pain, consistent with     1. Postlaminectomy syndrome of lumbar region  MRI Lumbar Spine Without Contrast    X-Ray Lumbar Complete Including Flex And Ext      2. Dorsalgia, unspecified  MRI Lumbar Spine Without Contrast    X-Ray Lumbar Complete Including Flex And Ext      3. Lumbar radiculopathy  X-Ray Lumbar Complete Including Flex And Ext      4. Weakness of hip  MRI Hip Without Contrast Right    X-Ray Hip 2 or 3 views Right with Pelvis when performed        PLAN:     Post laminectomy syndrome with right leg pain/radiculopathy  -MRI    Right hip abductor weakness  -MRI hip and back  -suspect this is coming from the back    - RTC after MRI  - Counseled patient regarding the importance of activity modification and physical therapy.    The above plan and management options were discussed at length with patient. Patient is in agreement with the above and verbalized understanding. It will be communicated with the referring physician via electronic record, fax, or mail.  Lab/study reports reviewed were important and necessary because subsequent medical and treatment recommendations required review of the above lab/study reports. Images viewed/reviewed above were important and necessary because subsequent medical and treatment recommendations required review of the reviewed image(s).     Electronically signed by:  Micheal Gamble  DO  11/14/2024    =========================================================================================================    SUBJECTIVE:    Bryant King is a 74 y.o. male presents to the clinic for the evaluation of lower right back pain. The pain started 3-4 years ago following no inciting event and symptoms have been worsening.  The patient has had backpain since he was 15 when he had to have a fusion.  He states that the back pain has been getting worse.  He completed PT about 2 months ago which helps some.  The right side just never relaxes.  He cannot play golf without stopping every 1-2 holes to stretch.  The pain is not excruciating, but very aggravating.  He does not have much pain with sitting.  Not much pain with laying down. But he does wake up in the middle of the night to stretch. It hurts the most when he is walking.    Pain Description:    The pain is located in the lower back right side area and radiates to the right leg and foot .    At BEST  5/10   At WORST  8/10 on the WORST day.    On average pain is rated as 5/10.   Today the pain is rated as 5/10  The pain is continuous.  The pain is described as aching, tight band, and stiffness, numbness     Symptoms interfere with daily activity.   Exacerbating factors: Sitting.    Mitigating factors nothing.   He reports 6-7 hours of sleep per night.    Physical Therapy/Home Exercise: No, not currently in physical therapy or home exercise program    Current Pain Medications:    - gabapentin 600mg qam and 300mg qpm, fish oil    Failed Pain Medications:    - cannot take NSAIDs due to heart    Pain Treatment Therapies:    Pain procedures: has had something but not sure  Physical Therapy: yes  Chiropractor: none  Acupuncture: none  TENS unit: none  Spinal decompression:   Fusion surgery when he was 15years old. Maybe L5  Joint replacement: none    Patient denies urinary incontinence and bowel incontinence.  Patient denies any suicidal or homicidal  ideations     report:  Reviewed and consistent with medication use as prescribed.    Imaging:   XR lumbar 2019:    Slight convex right curvature lumbar spine there is grade 1 retrolisthesis of L3 on L4 and grade 1 anterolisthesis of L4 on L5.  No evidence for spondylolysis.  There is scattered endplate degeneration allowing for degenerative change the lumbar vertebral body heights and contours are within normal is without evidence for acute fracture.  Tortuous atherosclerotic aorta.  Further evaluation as warranted clinically..         11/14/2024     3:56 PM   Pain Disability Index (PDI)   Family/Home Responsibilities: 5   Recreation: 5   Occupation: 5   Sexual Behavior: 5   Self Care: 5   Life-Support Activities: 5   Pain Disability Index (PDI) 35        Past Medical History:   Diagnosis Date    Coronary artery disease     Hyperlipidemia     Hypertension     Migraines     Type 2 diabetes mellitus without complication, without long-term current use of insulin      Past Surgical History:   Procedure Laterality Date    AORTIC VALVE REPLACEMENT      back fusion      CORONARY ARTERY BYPASS GRAFT  02/08/2012    HERNIA REPAIR      umbilical hernia repair with possible mesh    laparoscopic hiatal hernia repair with mesh      PATELLA FRACTURE SURGERY      TONSILLECTOMY       Social History     Socioeconomic History    Marital status:    Tobacco Use    Smoking status: Never     Passive exposure: Never    Smokeless tobacco: Never   Substance and Sexual Activity    Alcohol use: No     Social Drivers of Health     Financial Resource Strain: Low Risk  (11/5/2024)    Overall Financial Resource Strain (CARDIA)     Difficulty of Paying Living Expenses: Not hard at all   Food Insecurity: No Food Insecurity (11/5/2024)    Hunger Vital Sign     Worried About Running Out of Food in the Last Year: Never true     Ran Out of Food in the Last Year: Never true   Physical Activity: Sufficiently Active (11/5/2024)    Exercise Vital  Sign     Days of Exercise per Week: 4 days     Minutes of Exercise per Session: 60 min   Stress: No Stress Concern Present (11/5/2024)    [employer] [place] [employer] - Occupational Stress Questionnaire     Feeling of Stress : Not at all   Housing Stability: Unknown (11/5/2024)    Housing Stability Vital Sign     Unable to Pay for Housing in the Last Year: No     Family History   Problem Relation Name Age of Onset    Hypertension Mother      Hypertension Father      Heart disease Brother      Heart attack Neg Hx         Review of patient's allergies indicates:  No Known Allergies    Current Outpatient Medications   Medication Sig    ALPRAZolam (XANAX) 0.5 MG tablet Take 0.5 mg by mouth 2 (two) times daily as needed.     amoxicillin (AMOXIL) 500 MG Tab TAKE 4 TABLETS (2000 MG) 1 HOUR PRIOR TO DENTAL WORK AS NEEDED    atorvastatin (LIPITOR) 80 MG tablet TAKE 1 TABLET EVERY DAY    carvediloL (COREG) 12.5 MG tablet TAKE 1 TABLET TWICE DAILY OR AS DIRECTED    ciclopirox (LOPROX) 0.77 % Crea Apply topically.    ezetimibe (ZETIA) 10 mg tablet TAKE 1 TABLET EVERY DAY    fluticasone propionate (FLONASE) 50 mcg/actuation nasal spray 1 spray by Nasal route daily as needed.    gabapentin (NEURONTIN) 300 MG capsule TAKE 2 CAPSULES BY MOUTH 3 TIMES A DAY    glimepiride (AMARYL) 2 MG tablet TAKE 1 TAB PO IN THE AM, TAKE 2 TABS PO IN THE PM    JARDIANCE 10 mg tablet TAKE 1 TABLET EVERY DAY    nitroGLYCERIN (NITROSTAT) 0.4 MG SL tablet DISSOLVE 1 TABLET UNDER THE TONGUE EVERY 5 MINUTES AS NEEDED FOR CHEST PAIN AS DIRECTED    omega-3 fatty acids/fish oil (FISH OIL-OMEGA-3 FATTY ACIDS) 300-1,000 mg capsule Take by mouth once daily.    rizatriptan (MAXALT) 10 MG tablet Take 10 mg by mouth as needed for Migraine.    aspirin (ECOTRIN) 81 MG EC tablet Take 1 tablet (81 mg total) by mouth once daily.     Current Facility-Administered Medications   Medication    acetaminophen tablet 650 mg    albuterol inhaler 2 puff     "diphenhydrAMINE injection 25 mg    EPINEPHrine (EPIPEN) 0.3 mg/0.3 mL pen injection 0.3 mg    methylPREDNISolone sodium succinate injection 40 mg    ondansetron disintegrating tablet 4 mg    sodium chloride 0.9% 500 mL flush bag    sodium chloride 0.9% flush 10 mL       REVIEW OF SYSTEMS:    GENERAL:  No weight loss, malaise or fevers.:+weight loss  HEENT:   No recent changes in vision or hearing:NO  NECK:  Negative for lumps, no difficulty with swallowing.:NO  RESPIRATORY:  Negative for cough, wheezing or shortness of breath, patient denies any recent URI.:NO  CARDIOVASCULAR:  Negative for chest pain, leg swelling or palpitations.:+leg swelling  GI:  Negative for abdominal discomfort, blood in stools or black stools or change in bowel habits.:NO  MUSCULOSKELETAL:  See HPI.  SKIN:  Negative for lesions, rash, and itching.:NO  PSYCH:  No mood disorder or recent psychosocial stressors.  Patients sleep is not disturbed secondary to pain.:NO  HEMATOLOGY/LYMPHOLOGY:  Negative for prolonged bleeding, bruising easily or swollen nodes.  Patient is not currently taking any anti-coagulants:+low dose aspirin  NEURO:   No history of headaches, syncope, paralysis, seizures or tremors.:+migraine  All other reviewed and negative other than HPI.    OBJECTIVE:    /78 (BP Location: Left arm, Patient Position: Sitting)   Pulse (!) 57   Ht 5' 8" (1.727 m)   Wt 83 kg (182 lb 15.7 oz)   BMI 27.82 kg/m²     PHYSICAL EXAMINATION:    GENERAL: Well appearing, in no acute distress, alert and oriented x3.  PSYCH:  Mood and affect appropriate.  SKIN: Skin color, texture, turgor normal, no rashes or lesions.  HEAD/FACE:  Normocephalic, atraumatic. Cranial nerves grossly intact.  CV: RRR with palpation of the radial artery.  PULM: CTAB. No evidence of respiratory difficulty, symmetric chest rise.  GI:  Soft and non-tender.    BACK:   - No obvious deformity or signs of trauma, Normal lumbar lordotic curve  - Negative spinous process " tenderness  - Negative paravertebral tenderness  - Negative pain to palpation over the facet joints of the lumbar spine.   - Negative QL / Iliac crest / Glut tenderness  - Slump test is Negative for radicular pain  - Slump test is Negative for back pain  - Supine Straight leg raising is Negative for radicular pain  - Supine Straight leg raising is Negative for back pain  - Lumbar ROM is normal in Flexion without pain  - Lumbar ROM is normal in Extension without pain  - Lumbar ROM is normal in Lateral Flexion without pain  - Lumbar ROM is normal in Rotation without pain  - Negative Sustained Hip Flexion test (for discogenic pain)  - Positive Altered Gait, Posture  - Axial facet loading test Negative on the bilateral side(s)    SI Joint exam:  - Negative SI joint tenderness to palpation  - Moisés's sign Negative  - Yeoman's Test: Did not perform for SI joint pain indicating anterior SI ligament involvement. Did not perform for anterior thigh pain/paresthesia which indicates femoral nerve stretch.  - Gaenslen's Test:Negative  - Finger Kirstie's Sign:Negative  - SI compression test:Negative  - SI distraction test:Negative  - Thigh Thrust: Negative  - SI Thrust: Negative    MUSKULOSKELETAL:    EXTREMITIES:   Hip Exam:  - Log Roll Negative  - FADIR Negative  - Stinchfield Negative  - Hip Scour Negative  - GTB Tenderness Negative      MUSCULOSKELETAL:  No atrophy or tone abnormalities are noted in the UE or LE.  No deformities, edema, or skin discoloration are noted on visible skin. Good capillary refill.    NEURO: Bilateral upper and lower extremity coordination and muscle stretch reflexes are physiologic and symmetric.      NEUROLOGICAL EXAM:  MENTAL STATUS: A x O x 3, good concentration, speech is fluent and goal directed  MEMORY: recent and remote are intact  CN: CN2-12 grossly intact  MOTOR: 5/5 in all muscle groups  DTRs: 2+ intact symmetric  Sensation:    -no Loss of sensation in a left lower and right lower  leg   distribution.    GAIT: normal.

## 2024-12-02 ENCOUNTER — HOSPITAL ENCOUNTER (OUTPATIENT)
Dept: RADIOLOGY | Facility: HOSPITAL | Age: 74
Discharge: HOME | End: 2024-12-02
Attending: STUDENT IN AN ORGANIZED HEALTH CARE EDUCATION/TRAINING PROGRAM
Payer: MEDICARE

## 2024-12-02 DIAGNOSIS — M54.9 DORSALGIA, UNSPECIFIED: ICD-10-CM

## 2024-12-02 DIAGNOSIS — M96.1 POSTLAMINECTOMY SYNDROME OF LUMBAR REGION: ICD-10-CM

## 2024-12-02 DIAGNOSIS — R29.898 WEAKNESS OF HIP: ICD-10-CM

## 2024-12-02 DIAGNOSIS — M54.16 LUMBAR RADICULOPATHY: ICD-10-CM

## 2024-12-02 PROCEDURE — 73502 X-RAY EXAM HIP UNI 2-3 VIEWS: CPT | Mod: 26,RT,, | Performed by: RADIOLOGY

## 2024-12-02 PROCEDURE — 72148 MRI LUMBAR SPINE W/O DYE: CPT | Mod: 26,,, | Performed by: RADIOLOGY

## 2024-12-02 PROCEDURE — 73502 X-RAY EXAM HIP UNI 2-3 VIEWS: CPT | Mod: TC,RT

## 2024-12-02 PROCEDURE — 73721 MRI JNT OF LWR EXTRE W/O DYE: CPT | Mod: TC,RT

## 2024-12-02 PROCEDURE — 72114 X-RAY EXAM L-S SPINE BENDING: CPT | Mod: 26,,, | Performed by: RADIOLOGY

## 2024-12-02 PROCEDURE — 73721 MRI JNT OF LWR EXTRE W/O DYE: CPT | Mod: 26,RT,, | Performed by: RADIOLOGY

## 2024-12-02 PROCEDURE — 72148 MRI LUMBAR SPINE W/O DYE: CPT | Mod: TC

## 2024-12-02 PROCEDURE — 72114 X-RAY EXAM L-S SPINE BENDING: CPT | Mod: TC

## 2024-12-03 ENCOUNTER — TELEPHONE (OUTPATIENT)
Dept: PAIN MEDICINE | Facility: CLINIC | Age: 74
End: 2024-12-03
Payer: MEDICARE

## 2024-12-03 NOTE — PROGRESS NOTES
Omar Charles,     I spoke with Mr. Hurtado and successfully scheduled a virtual appt to f/u after MRI results. Thank you.

## 2024-12-03 NOTE — PROGRESS NOTES
Can we make sure he has a follow up to review is MRI findings?  I dont see that one was made.  Thank you

## 2024-12-11 ENCOUNTER — OFFICE VISIT (OUTPATIENT)
Dept: PAIN MEDICINE | Facility: CLINIC | Age: 74
End: 2024-12-11
Payer: MEDICARE

## 2024-12-11 ENCOUNTER — TELEPHONE (OUTPATIENT)
Dept: PAIN MEDICINE | Facility: CLINIC | Age: 74
End: 2024-12-11
Payer: MEDICARE

## 2024-12-11 DIAGNOSIS — M54.16 LUMBAR RADICULOPATHY: Primary | ICD-10-CM

## 2024-12-11 DIAGNOSIS — M48.062 SPINAL STENOSIS OF LUMBAR REGION WITH NEUROGENIC CLAUDICATION: ICD-10-CM

## 2024-12-11 DIAGNOSIS — M67.40 GANGLION CYST: ICD-10-CM

## 2024-12-11 DIAGNOSIS — M96.1 POSTLAMINECTOMY SYNDROME OF LUMBAR REGION: ICD-10-CM

## 2024-12-11 PROCEDURE — 99215 OFFICE O/P EST HI 40 MIN: CPT | Mod: 95,,, | Performed by: STUDENT IN AN ORGANIZED HEALTH CARE EDUCATION/TRAINING PROGRAM

## 2024-12-11 NOTE — H&P (VIEW-ONLY)
Chronic Pain - f/u    Referring Physician: No ref. provider found    Date: 12/11/2024     Re: Bryant King  MR#: [id number]  YOB: 1950  Age: 74 y.o.    Chief Complaint:   No chief complaint on file.    **This note is dictated using the M*Modal Fluency Direct word recognition program. There are word recognition mistakes that are occasionally missed on review.**    ASSESSMENT: 74 y.o. year old male with back and leg pain, consistent with     1. Lumbar radiculopathy  Procedure Order to Pain Management    Ambulatory referral/consult to Neurosurgery      2. Postlaminectomy syndrome of lumbar region  Procedure Order to Pain Management      3. Spinal stenosis of lumbar region with neurogenic claudication  Procedure Order to Pain Management    Ambulatory referral/consult to Neurosurgery      4. Ganglion cyst  Ambulatory referral/consult to Neurosurgery          PLAN:     Post laminectomy syndrome with right leg pain/radiculopathy  -MRI reviewed with patient.  He has several significant findings that are probably causing the pain/weakness that he is complaining of in the right hip.  He has severe foraminal stenosis at L4-5 and L5-S1, severe spinal stenosis at both levels, and a cyst at L5-S1 that is probably compressing the spinal cord.   -Neurosurgery referral to discuss options  1/7/25 - caudal NATAN w/cath (toward the right - low volume to L5) - no sed - ASA ok    Right hip abductor weakness  -MRI hip shows mild arthritis and glut muscle atrophy  -glut muscle atrophy likely related to severe stenosis in the back  -suspect this is coming from the back    - RTC after NATAN  - Counseled patient regarding the importance of activity modification and physical therapy.    The above plan and management options were discussed at length with patient. Patient is in agreement with the above and verbalized understanding. It will be communicated with the referring physician via electronic record, fax, or mail.  Lab/study  reports reviewed were important and necessary because subsequent medical and treatment recommendations required review of the above lab/study reports. Images viewed/reviewed above were important and necessary because subsequent medical and treatment recommendations required review of the reviewed image(s).     Electronically signed by:  Micheal Gamble DO  12/11/2024    Patient was seen in clinic today.  The total amount of time spent on this patient was exactly 42 minutes.  Due to medical complexity, time spent with the patient, and multiple issues discussed/addressed I am billing for a level 5 visit. This includes face to face time and non-face to face time preparing to see the patient by reviewing previous labs/imaging, obtaining and/or reviewing separately obtained history, documenting clinical information in the electronic or other health record, independently reviewing results and communicating results to the patient/family/caregiver/additional providers.  The available imaging was reviewed in person with the patient, pathology and treatment options were explained in detail with the patient.  The patient was given multiple opportunities to ask questions, and all questions were answered.  =========================================================================================================    SUBJECTIVE:    Chronic Pain-Tele-Medicine     The patient location is: Home  The chief complaint leading to consultation is: Pain  Visit type: Virtual visit with synchronous audio and video  Total time spent with patient: 40 min  Each patient to whom he or she provides medical services by telemedicine is:  (1) informed of the relationship between the physician and patient and the respective role of any other health care provider with respect to management of the patient; and (2) notified that he or she may decline to receive medical services by telemedicine and may withdraw from such care at any time.    Interval  History 12/11/2024:   Bryant King is a 74 y.o. male presents to the clinic for follow up.  Since last visit the pain has is unchanged.  His pain is mostly in the right buttocks that is worse with ambulation.  Better with rest.    The pain is located in the right buttocks/low back area and radiates to the right hip .  The pain is described as aching and sharp    At BEST  5/10   At WORST  8/10 on the WORST day.    On average pain is rated as 5/10.   Today the pain is rated as 6/10  Symptoms interfere with daily activity.   Exacerbating factors: Sitting and Walking.    Mitigating factors nothing.     Current pain medications: gabapentin 600mg qam and 300mg qpm, fish oil  Failed Pain Medications: cannot take NSAIDs due to heart    Initial hx:  Bryant King is a 74 y.o. male presents to the clinic for the evaluation of lower right back pain. The pain started 3-4 years ago following no inciting event and symptoms have been worsening.  The patient has had backpain since he was 15 when he had to have a fusion.  He states that the back pain has been getting worse.  He completed PT about 2 months ago which helps some.  The right side just never relaxes.  He cannot play golf without stopping every 1-2 holes to stretch.  The pain is not excruciating, but very aggravating.  He does not have much pain with sitting.  Not much pain with laying down. But he does wake up in the middle of the night to stretch. It hurts the most when he is walking.    Pain Description:    The pain is located in the lower back right side area and radiates to the right leg and foot .    At BEST  5/10   At WORST  8/10 on the WORST day.    On average pain is rated as 5/10.   Today the pain is rated as 5/10  The pain is continuous.  The pain is described as aching, tight band, and stiffness, numbness     Symptoms interfere with daily activity.   Exacerbating factors: Sitting.    Mitigating factors nothing.   He reports 6-7 hours of sleep per  night.    Physical Therapy/Home Exercise: No, not currently in physical therapy or home exercise program    Current Pain Medications:    - gabapentin 600mg qam and 300mg qpm, fish oil    Failed Pain Medications:    - cannot take NSAIDs due to heart    Pain Treatment Therapies:    Pain procedures: has had something but not sure  Physical Therapy: yes  Chiropractor: none  Acupuncture: none  TENS unit: none  Spinal decompression:   Fusion surgery when he was 15years old. Maybe L5  Joint replacement: none    Patient denies urinary incontinence and bowel incontinence.  Patient denies any suicidal or homicidal ideations     report:  Reviewed and consistent with medication use as prescribed.    Imaging:   XR lumbar 2019:    Slight convex right curvature lumbar spine there is grade 1 retrolisthesis of L3 on L4 and grade 1 anterolisthesis of L4 on L5.  No evidence for spondylolysis.  There is scattered endplate degeneration allowing for degenerative change the lumbar vertebral body heights and contours are within normal is without evidence for acute fracture.  Tortuous atherosclerotic aorta.  Further evaluation as warranted clinically..         11/14/2024     3:56 PM   Pain Disability Index (PDI)   Family/Home Responsibilities: 5   Recreation: 5   Occupation: 5   Sexual Behavior: 5   Self Care: 5   Life-Support Activities: 5   Pain Disability Index (PDI) 35        Past Medical History:   Diagnosis Date    Coronary artery disease     Hyperlipidemia     Hypertension     Migraines     Type 2 diabetes mellitus without complication, without long-term current use of insulin      Past Surgical History:   Procedure Laterality Date    AORTIC VALVE REPLACEMENT      back fusion      CORONARY ARTERY BYPASS GRAFT  02/08/2012    HERNIA REPAIR      umbilical hernia repair with possible mesh    laparoscopic hiatal hernia repair with mesh      PATELLA FRACTURE SURGERY      TONSILLECTOMY       Social History     Socioeconomic History     Marital status:    Tobacco Use    Smoking status: Never     Passive exposure: Never    Smokeless tobacco: Never   Substance and Sexual Activity    Alcohol use: No     Social Drivers of Health     Financial Resource Strain: Low Risk  (11/5/2024)    Overall Financial Resource Strain (CARDIA)     Difficulty of Paying Living Expenses: Not hard at all   Food Insecurity: No Food Insecurity (11/5/2024)    Hunger Vital Sign     Worried About Running Out of Food in the Last Year: Never true     Ran Out of Food in the Last Year: Never true   Physical Activity: Sufficiently Active (11/5/2024)    Exercise Vital Sign     Days of Exercise per Week: 4 days     Minutes of Exercise per Session: 60 min   Stress: No Stress Concern Present (11/5/2024)    [employer] - Occupational Stress Questionnaire     Feeling of Stress : Not at all   Housing Stability: Unknown (11/5/2024)    Housing Stability Vital Sign     Unable to Pay for Housing in the Last Year: No     Family History   Problem Relation Name Age of Onset    Hypertension Mother      Hypertension Father      Heart disease Brother      Heart attack Neg Hx         Review of patient's allergies indicates:  No Known Allergies    Current Outpatient Medications   Medication Sig    ALPRAZolam (XANAX) 0.5 MG tablet Take 0.5 mg by mouth 2 (two) times daily as needed.     amoxicillin (AMOXIL) 500 MG Tab TAKE 4 TABLETS (2000 MG) 1 HOUR PRIOR TO DENTAL WORK AS NEEDED    aspirin (ECOTRIN) 81 MG EC tablet Take 1 tablet (81 mg total) by mouth once daily.    atorvastatin (LIPITOR) 80 MG tablet TAKE 1 TABLET EVERY DAY    carvediloL (COREG) 12.5 MG tablet TAKE 1 TABLET TWICE DAILY OR AS DIRECTED    ciclopirox (LOPROX) 0.77 % Crea Apply topically.    ezetimibe (ZETIA) 10 mg tablet TAKE 1 TABLET EVERY DAY    fluticasone propionate (FLONASE) 50 mcg/actuation nasal spray 1 spray by Nasal route daily as needed.    gabapentin (NEURONTIN) 300 MG capsule TAKE 2 CAPSULES  BY MOUTH 3 TIMES A DAY    glimepiride (AMARYL) 2 MG tablet TAKE 1 TAB PO IN THE AM, TAKE 2 TABS PO IN THE PM    JARDIANCE 10 mg tablet TAKE 1 TABLET EVERY DAY    nitroGLYCERIN (NITROSTAT) 0.4 MG SL tablet DISSOLVE 1 TABLET UNDER THE TONGUE EVERY 5 MINUTES AS NEEDED FOR CHEST PAIN AS DIRECTED    omega-3 fatty acids/fish oil (FISH OIL-OMEGA-3 FATTY ACIDS) 300-1,000 mg capsule Take by mouth once daily.    rizatriptan (MAXALT) 10 MG tablet Take 10 mg by mouth as needed for Migraine.     Current Facility-Administered Medications   Medication    acetaminophen tablet 650 mg    albuterol inhaler 2 puff    diphenhydrAMINE injection 25 mg    EPINEPHrine (EPIPEN) 0.3 mg/0.3 mL pen injection 0.3 mg    methylPREDNISolone sodium succinate injection 40 mg    ondansetron disintegrating tablet 4 mg    sodium chloride 0.9% 500 mL flush bag    sodium chloride 0.9% flush 10 mL       REVIEW OF SYSTEMS:    GENERAL:  No weight loss, malaise or fevers.:+weight loss  HEENT:   No recent changes in vision or hearing:NO  NECK:  Negative for lumps, no difficulty with swallowing.:NO  RESPIRATORY:  Negative for cough, wheezing or shortness of breath, patient denies any recent URI.:NO  CARDIOVASCULAR:  Negative for chest pain, leg swelling or palpitations.:+leg swelling  GI:  Negative for abdominal discomfort, blood in stools or black stools or change in bowel habits.:NO  MUSCULOSKELETAL:  See HPI.  SKIN:  Negative for lesions, rash, and itching.:NO  PSYCH:  No mood disorder or recent psychosocial stressors.  Patients sleep is not disturbed secondary to pain.:NO  HEMATOLOGY/LYMPHOLOGY:  Negative for prolonged bleeding, bruising easily or swollen nodes.  Patient is not currently taking any anti-coagulants:+low dose aspirin  NEURO:   No history of headaches, syncope, paralysis, seizures or tremors.:+migraine  All other reviewed and negative other than HPI.    OBJECTIVE:    There were no vitals taken for this visit.    PHYSICAL EXAMINATION:  Video  visit:  GENERAL: Well appearing, in no acute distress, alert and oriented x3.  PSYCH:  Mood and affect appropriate.  SKIN: Skin color, texture, turgor normal, no rashes or lesions.  HEAD/FACE:  Normocephalic, atraumatic. Cranial nerves grossly intact.      Prior-in-person visit  CV: RRR with palpation of the radial artery.  PULM: CTAB. No evidence of respiratory difficulty, symmetric chest rise.  GI:  Soft and non-tender.    BACK:   - No obvious deformity or signs of trauma, Normal lumbar lordotic curve  - Negative spinous process tenderness  - Negative paravertebral tenderness  - Negative pain to palpation over the facet joints of the lumbar spine.   - Negative QL / Iliac crest / Glut tenderness  - Slump test is Negative for radicular pain  - Slump test is Negative for back pain  - Supine Straight leg raising is Negative for radicular pain  - Supine Straight leg raising is Negative for back pain  - Lumbar ROM is normal in Flexion without pain  - Lumbar ROM is normal in Extension without pain  - Lumbar ROM is normal in Lateral Flexion without pain  - Lumbar ROM is normal in Rotation without pain  - Negative Sustained Hip Flexion test (for discogenic pain)  - Positive Altered Gait, Posture  - Axial facet loading test Negative on the bilateral side(s)    SI Joint exam:  - Negative SI joint tenderness to palpation  - Moisés's sign Negative  - Yeoman's Test: Did not perform for SI joint pain indicating anterior SI ligament involvement. Did not perform for anterior thigh pain/paresthesia which indicates femoral nerve stretch.  - Gaenslen's Test:Negative  - Finger Kirstie's Sign:Negative  - SI compression test:Negative  - SI distraction test:Negative  - Thigh Thrust: Negative  - SI Thrust: Negative    MUSKULOSKELETAL:    EXTREMITIES:   Hip Exam:  - Log Roll Negative  - FADIR Negative  - Stinchfield Negative  - Hip Scour Negative  - GTB Tenderness Negative      MUSCULOSKELETAL:  No atrophy or tone abnormalities are noted  in the UE or LE.  No deformities, edema, or skin discoloration are noted on visible skin. Good capillary refill.    NEURO: Bilateral upper and lower extremity coordination and muscle stretch reflexes are physiologic and symmetric.      NEUROLOGICAL EXAM:  MENTAL STATUS: A x O x 3, good concentration, speech is fluent and goal directed  MEMORY: recent and remote are intact  CN: CN2-12 grossly intact  MOTOR: 5/5 in all muscle groups  DTRs: 2+ intact symmetric  Sensation:    -no Loss of sensation in a left lower and right lower  leg  distribution.    GAIT: normal.

## 2024-12-11 NOTE — TELEPHONE ENCOUNTER
----- Message from Micheal Hawk DO sent at 2024  3:43 PM CST -----  Regarding: Order for ALEX RIBEIRO    Patient Name: ALEX RIBEIRO([id number])  Sex: Male  : 1950      PCP: LILLY SALAS JR    Center: [facility]     Types of orders made on 2024: Procedure Request    Order Date:2024  Ordering User:MICHEAL HAWK [[id number]]  Encounter Provider:Micheal Hawk DO [[id number]]  Authorizing Provider: Micheal Hawk DO [[id number]]  Department:[facility] PAIN MANAGEMENT[[id number]]    Common Order Information  Procedure -> Epidural Injection (specify level) Cmt: caudal NATAN    Pre-op Diagnosis -> Lumbar radiculopathy     Order Specific Information  Order: Procedure Order to Pain Management [Custom: [id number]]  Order #:          [id number]: 1 FUTURE    Priority: Routine  Class: Clinic Performed    Future Order Information      Expires on:2025            Expected by:2024                   Comment:25 - caudal NATAN w/cath (toward the right - low volume to L5) - no             sed - ASA ok    Associated Diagnoses      M96.1 Postlaminectomy syndrome of lumbar region      M54.16 Lumbar radiculopathy      M48.062 Spinal stenosis of lumbar region with neurogenic claudication      Physician -> gladis         Is patient on anti-coagulants? -> No Cmt: asa ok        Facility Name: -> [facility]           Priority: Routine  Class: Clinic Performed    Future Order Information      Expires on:2025            Expected by:2024                   Comment:25 - caudal NATAN w/cath (toward the right - low volume to L5) - no             sed - ASA ok    Associated Diagnoses      M96.1 Postlaminectomy syndrome of lumbar region      M54.16 Lumbar radiculopathy      M48.062 Spinal stenosis of lumbar region with neurogenic claudication      Procedure -> Epidural Injection (specify level) Cmt: caudal NATAN        Physician -> gladis         Is patient on  anti-coagulants? -> No Cmt: asa ok        Pre-op Diagnosis -> Lumbar radiculopathy         Facility Name: -> [facility]

## 2024-12-11 NOTE — PROGRESS NOTES
Chronic Pain - f/u    Referring Physician: No ref. provider found    Date: 12/11/2024     Re: Bryant King  MR#: [id number]  YOB: 1950  Age: 74 y.o.    Chief Complaint:   No chief complaint on file.    **This note is dictated using the M*Modal Fluency Direct word recognition program. There are word recognition mistakes that are occasionally missed on review.**    ASSESSMENT: 74 y.o. year old male with back and leg pain, consistent with     1. Lumbar radiculopathy  Procedure Order to Pain Management    Ambulatory referral/consult to Neurosurgery      2. Postlaminectomy syndrome of lumbar region  Procedure Order to Pain Management      3. Spinal stenosis of lumbar region with neurogenic claudication  Procedure Order to Pain Management    Ambulatory referral/consult to Neurosurgery      4. Ganglion cyst  Ambulatory referral/consult to Neurosurgery          PLAN:     Post laminectomy syndrome with right leg pain/radiculopathy  -MRI reviewed with patient.  He has several significant findings that are probably causing the pain/weakness that he is complaining of in the right hip.  He has severe foraminal stenosis at L4-5 and L5-S1, severe spinal stenosis at both levels, and a cyst at L5-S1 that is probably compressing the spinal cord.   -Neurosurgery referral to discuss options  1/7/25 - caudal NATAN w/cath (toward the right - low volume to L5) - no sed - ASA ok    Right hip abductor weakness  -MRI hip shows mild arthritis and glut muscle atrophy  -glut muscle atrophy likely related to severe stenosis in the back  -suspect this is coming from the back    - RTC after NATAN  - Counseled patient regarding the importance of activity modification and physical therapy.    The above plan and management options were discussed at length with patient. Patient is in agreement with the above and verbalized understanding. It will be communicated with the referring physician via electronic record, fax, or mail.  Lab/study  reports reviewed were important and necessary because subsequent medical and treatment recommendations required review of the above lab/study reports. Images viewed/reviewed above were important and necessary because subsequent medical and treatment recommendations required review of the reviewed image(s).     Electronically signed by:  Micheal Gamble DO  12/11/2024    Patient was seen in clinic today.  The total amount of time spent on this patient was exactly 42 minutes.  Due to medical complexity, time spent with the patient, and multiple issues discussed/addressed I am billing for a level 5 visit. This includes face to face time and non-face to face time preparing to see the patient by reviewing previous labs/imaging, obtaining and/or reviewing separately obtained history, documenting clinical information in the electronic or other health record, independently reviewing results and communicating results to the patient/family/caregiver/additional providers.  The available imaging was reviewed in person with the patient, pathology and treatment options were explained in detail with the patient.  The patient was given multiple opportunities to ask questions, and all questions were answered.  =========================================================================================================    SUBJECTIVE:    Chronic Pain-Tele-Medicine     The patient location is: Home  The chief complaint leading to consultation is: Pain  Visit type: Virtual visit with synchronous audio and video  Total time spent with patient: 40 min  Each patient to whom he or she provides medical services by telemedicine is:  (1) informed of the relationship between the physician and patient and the respective role of any other health care provider with respect to management of the patient; and (2) notified that he or she may decline to receive medical services by telemedicine and may withdraw from such care at any time.    Interval  History 12/11/2024:   Bryant King is a 74 y.o. male presents to the clinic for follow up.  Since last visit the pain has is unchanged.  His pain is mostly in the right buttocks that is worse with ambulation.  Better with rest.    The pain is located in the right buttocks/low back area and radiates to the right hip .  The pain is described as aching and sharp    At BEST  5/10   At WORST  8/10 on the WORST day.    On average pain is rated as 5/10.   Today the pain is rated as 6/10  Symptoms interfere with daily activity.   Exacerbating factors: Sitting and Walking.    Mitigating factors nothing.     Current pain medications: gabapentin 600mg qam and 300mg qpm, fish oil  Failed Pain Medications: cannot take NSAIDs due to heart    Initial hx:  Bryant King is a 74 y.o. male presents to the clinic for the evaluation of lower right back pain. The pain started 3-4 years ago following no inciting event and symptoms have been worsening.  The patient has had backpain since he was 15 when he had to have a fusion.  He states that the back pain has been getting worse.  He completed PT about 2 months ago which helps some.  The right side just never relaxes.  He cannot play golf without stopping every 1-2 holes to stretch.  The pain is not excruciating, but very aggravating.  He does not have much pain with sitting.  Not much pain with laying down. But he does wake up in the middle of the night to stretch. It hurts the most when he is walking.    Pain Description:    The pain is located in the lower back right side area and radiates to the right leg and foot .    At BEST  5/10   At WORST  8/10 on the WORST day.    On average pain is rated as 5/10.   Today the pain is rated as 5/10  The pain is continuous.  The pain is described as aching, tight band, and stiffness, numbness     Symptoms interfere with daily activity.   Exacerbating factors: Sitting.    Mitigating factors nothing.   He reports 6-7 hours of sleep per  night.    Physical Therapy/Home Exercise: No, not currently in physical therapy or home exercise program    Current Pain Medications:    - gabapentin 600mg qam and 300mg qpm, fish oil    Failed Pain Medications:    - cannot take NSAIDs due to heart    Pain Treatment Therapies:    Pain procedures: has had something but not sure  Physical Therapy: yes  Chiropractor: none  Acupuncture: none  TENS unit: none  Spinal decompression:   Fusion surgery when he was 15years old. Maybe L5  Joint replacement: none    Patient denies urinary incontinence and bowel incontinence.  Patient denies any suicidal or homicidal ideations     report:  Reviewed and consistent with medication use as prescribed.    Imaging:   XR lumbar 2019:    Slight convex right curvature lumbar spine there is grade 1 retrolisthesis of L3 on L4 and grade 1 anterolisthesis of L4 on L5.  No evidence for spondylolysis.  There is scattered endplate degeneration allowing for degenerative change the lumbar vertebral body heights and contours are within normal is without evidence for acute fracture.  Tortuous atherosclerotic aorta.  Further evaluation as warranted clinically..         11/14/2024     3:56 PM   Pain Disability Index (PDI)   Family/Home Responsibilities: 5   Recreation: 5   Occupation: 5   Sexual Behavior: 5   Self Care: 5   Life-Support Activities: 5   Pain Disability Index (PDI) 35        Past Medical History:   Diagnosis Date    Coronary artery disease     Hyperlipidemia     Hypertension     Migraines     Type 2 diabetes mellitus without complication, without long-term current use of insulin      Past Surgical History:   Procedure Laterality Date    AORTIC VALVE REPLACEMENT      back fusion      CORONARY ARTERY BYPASS GRAFT  02/08/2012    HERNIA REPAIR      umbilical hernia repair with possible mesh    laparoscopic hiatal hernia repair with mesh      PATELLA FRACTURE SURGERY      TONSILLECTOMY       Social History     Socioeconomic History     Marital status:    Tobacco Use    Smoking status: Never     Passive exposure: Never    Smokeless tobacco: Never   Substance and Sexual Activity    Alcohol use: No     Social Drivers of Health     Financial Resource Strain: Low Risk  (11/5/2024)    Overall Financial Resource Strain (CARDIA)     Difficulty of Paying Living Expenses: Not hard at all   Food Insecurity: No Food Insecurity (11/5/2024)    Hunger Vital Sign     Worried About Running Out of Food in the Last Year: Never true     Ran Out of Food in the Last Year: Never true   Physical Activity: Sufficiently Active (11/5/2024)    Exercise Vital Sign     Days of Exercise per Week: 4 days     Minutes of Exercise per Session: 60 min   Stress: No Stress Concern Present (11/5/2024)    [facility] - Occupational Stress Questionnaire     Feeling of Stress : Not at all   Housing Stability: Unknown (11/5/2024)    Housing Stability Vital Sign     Unable to Pay for Housing in the Last Year: No     Family History   Problem Relation Name Age of Onset    Hypertension Mother      Hypertension Father      Heart disease Brother      Heart attack Neg Hx         Review of patient's allergies indicates:  No Known Allergies    Current Outpatient Medications   Medication Sig    ALPRAZolam (XANAX) 0.5 MG tablet Take 0.5 mg by mouth 2 (two) times daily as needed.     amoxicillin (AMOXIL) 500 MG Tab TAKE 4 TABLETS (2000 MG) 1 HOUR PRIOR TO DENTAL WORK AS NEEDED    aspirin (ECOTRIN) 81 MG EC tablet Take 1 tablet (81 mg total) by mouth once daily.    atorvastatin (LIPITOR) 80 MG tablet TAKE 1 TABLET EVERY DAY    carvediloL (COREG) 12.5 MG tablet TAKE 1 TABLET TWICE DAILY OR AS DIRECTED    ciclopirox (LOPROX) 0.77 % Crea Apply topically.    ezetimibe (ZETIA) 10 mg tablet TAKE 1 TABLET EVERY DAY    fluticasone propionate (FLONASE) 50 mcg/actuation nasal spray 1 spray by Nasal route daily as needed.    gabapentin (NEURONTIN) 300 MG capsule TAKE 2 CAPSULES  BY MOUTH 3 TIMES A DAY    glimepiride (AMARYL) 2 MG tablet TAKE 1 TAB PO IN THE AM, TAKE 2 TABS PO IN THE PM    JARDIANCE 10 mg tablet TAKE 1 TABLET EVERY DAY    nitroGLYCERIN (NITROSTAT) 0.4 MG SL tablet DISSOLVE 1 TABLET UNDER THE TONGUE EVERY 5 MINUTES AS NEEDED FOR CHEST PAIN AS DIRECTED    omega-3 fatty acids/fish oil (FISH OIL-OMEGA-3 FATTY ACIDS) 300-1,000 mg capsule Take by mouth once daily.    rizatriptan (MAXALT) 10 MG tablet Take 10 mg by mouth as needed for Migraine.     Current Facility-Administered Medications   Medication    acetaminophen tablet 650 mg    albuterol inhaler 2 puff    diphenhydrAMINE injection 25 mg    EPINEPHrine (EPIPEN) 0.3 mg/0.3 mL pen injection 0.3 mg    methylPREDNISolone sodium succinate injection 40 mg    ondansetron disintegrating tablet 4 mg    sodium chloride 0.9% 500 mL flush bag    sodium chloride 0.9% flush 10 mL       REVIEW OF SYSTEMS:    GENERAL:  No weight loss, malaise or fevers.:+weight loss  HEENT:   No recent changes in vision or hearing:NO  NECK:  Negative for lumps, no difficulty with swallowing.:NO  RESPIRATORY:  Negative for cough, wheezing or shortness of breath, patient denies any recent URI.:NO  CARDIOVASCULAR:  Negative for chest pain, leg swelling or palpitations.:+leg swelling  GI:  Negative for abdominal discomfort, blood in stools or black stools or change in bowel habits.:NO  MUSCULOSKELETAL:  See HPI.  SKIN:  Negative for lesions, rash, and itching.:NO  PSYCH:  No mood disorder or recent psychosocial stressors.  Patients sleep is not disturbed secondary to pain.:NO  HEMATOLOGY/LYMPHOLOGY:  Negative for prolonged bleeding, bruising easily or swollen nodes.  Patient is not currently taking any anti-coagulants:+low dose aspirin  NEURO:   No history of headaches, syncope, paralysis, seizures or tremors.:+migraine  All other reviewed and negative other than HPI.    OBJECTIVE:    There were no vitals taken for this visit.    PHYSICAL EXAMINATION:  Video  visit:  GENERAL: Well appearing, in no acute distress, alert and oriented x3.  PSYCH:  Mood and affect appropriate.  SKIN: Skin color, texture, turgor normal, no rashes or lesions.  HEAD/FACE:  Normocephalic, atraumatic. Cranial nerves grossly intact.      Prior-in-person visit  CV: RRR with palpation of the radial artery.  PULM: CTAB. No evidence of respiratory difficulty, symmetric chest rise.  GI:  Soft and non-tender.    BACK:   - No obvious deformity or signs of trauma, Normal lumbar lordotic curve  - Negative spinous process tenderness  - Negative paravertebral tenderness  - Negative pain to palpation over the facet joints of the lumbar spine.   - Negative QL / Iliac crest / Glut tenderness  - Slump test is Negative for radicular pain  - Slump test is Negative for back pain  - Supine Straight leg raising is Negative for radicular pain  - Supine Straight leg raising is Negative for back pain  - Lumbar ROM is normal in Flexion without pain  - Lumbar ROM is normal in Extension without pain  - Lumbar ROM is normal in Lateral Flexion without pain  - Lumbar ROM is normal in Rotation without pain  - Negative Sustained Hip Flexion test (for discogenic pain)  - Positive Altered Gait, Posture  - Axial facet loading test Negative on the bilateral side(s)    SI Joint exam:  - Negative SI joint tenderness to palpation  - Moisés's sign Negative  - Yeoman's Test: Did not perform for SI joint pain indicating anterior SI ligament involvement. Did not perform for anterior thigh pain/paresthesia which indicates femoral nerve stretch.  - Gaenslen's Test:Negative  - Finger Kirstie's Sign:Negative  - SI compression test:Negative  - SI distraction test:Negative  - Thigh Thrust: Negative  - SI Thrust: Negative    MUSKULOSKELETAL:    EXTREMITIES:   Hip Exam:  - Log Roll Negative  - FADIR Negative  - Stinchfield Negative  - Hip Scour Negative  - GTB Tenderness Negative      MUSCULOSKELETAL:  No atrophy or tone abnormalities are noted  in the UE or LE.  No deformities, edema, or skin discoloration are noted on visible skin. Good capillary refill.    NEURO: Bilateral upper and lower extremity coordination and muscle stretch reflexes are physiologic and symmetric.      NEUROLOGICAL EXAM:  MENTAL STATUS: A x O x 3, good concentration, speech is fluent and goal directed  MEMORY: recent and remote are intact  CN: CN2-12 grossly intact  MOTOR: 5/5 in all muscle groups  DTRs: 2+ intact symmetric  Sensation:    -no Loss of sensation in a left lower and right lower  leg  distribution.    GAIT: normal.

## 2024-12-12 ENCOUNTER — TELEPHONE (OUTPATIENT)
Dept: PAIN MEDICINE | Facility: CLINIC | Age: 74
End: 2024-12-12
Payer: MEDICARE

## 2024-12-13 ENCOUNTER — PATIENT MESSAGE (OUTPATIENT)
Dept: PAIN MEDICINE | Facility: CLINIC | Age: 74
End: 2024-12-13
Payer: MEDICARE

## 2024-12-23 ENCOUNTER — TELEPHONE (OUTPATIENT)
Dept: PAIN MEDICINE | Facility: CLINIC | Age: 74
End: 2024-12-23
Payer: MEDICARE

## 2024-12-30 ENCOUNTER — TELEPHONE (OUTPATIENT)
Dept: PAIN MEDICINE | Facility: CLINIC | Age: 74
End: 2024-12-30
Payer: MEDICARE

## 2025-01-06 NOTE — PRE-PROCEDURE INSTRUCTIONS
Patient reviewed on 1/3/2024.  Okay to proceed at [facility]. The following pre-procedure instructions and arrival time have been reviewed with patient via phone and sent to patient portal for review.  Patient verbalized an understanding.      Dear Tyrell,     Please read over the following pre-procedure instructions in it's entirety as there is helpful information here to get you well prepared for your upcoming procedure.     You are scheduled for a procedure with Dr. Gamble on 1/7/2024.     [facility] at the corner of [facility] and [facility]. It is in the [facility] [employer] [place] next to [facility]. The address is: [address] [place] [address]. Take the elevator to the 2nd floor.       Registration check in time: 11:30 am  Procedure scheduled for time: 1:30 pm     If you are receiving sedation, you CANNOT drive yourself and must have a responsible friend or family member (no rideshare) to drive you home.        You should take any medications that you routinely take for blood pressure, heart medications, thyroid, cholesterol, etc.      The fasting restrictions are dependent on whether or not you are receiving sedation. Sedation is not available for all procedures.      Your fasting instructions/Sedation type are as follow:  No sedation.  You do not need to fast before this procedure.  You can eat and drink like normal.  You can drive yourself (with stipulations).  There are some rare cases where you may need to call an uber if you are unable to drive after the procedure due to weakness/dizziness.         If you are on blood thinners, you need to follow the anticoagulation instructions that had been discussed previously. You should only stop the blood thinners if it was approved by your primary care physician or your cardiologist. In the event that you are not able to stop your blood thinners, a blood thinner was not listed on your medication list, or we were not able to get clearance from your  cardiologist, then the procedure may have to be postponed/canceled.      IF you were told to stop your blood thinners, this is how long you should generally hold some of the more common ones. Remember that stopping blood thinners is only necessary for certain procedures. If you are unsure of your instructions, please call us.   Aspirin - 5 days  Plavix/Clopidogrel - 7 days  Warfarin / Coumadin - 5 days  Eliquis - 3 days  Pradaxa/Dabigatran - 4 days  Xarelto/Rivaroxaban - 3 days     *If you take Ozempic, Trulicity, Mounjaro, Rybelsus, Zepbound Or other weight loss, non-insulin injections you must hold this for one week (7 days) prior if you are having IV sedation.      If you are a diabetic, do not take your medication if you will be fasting, but bring it with you. Please plan on being here for roughly 2-3 hours.      Please call us if any of the following have occurred:  *running fever or having any flu-like symptoms  *have been taking antibiotics in the past 2 weeks  *have had any or plan on having any immunizations in the 2 weeks before or after your injection(Flu/Shingles/Covid Booster/Pneumonia, etc.)  *had any out patient procedures other than with us in the past 2 weeks (Colonoscopy, Endoscopy, Biopsy, OBGYN, Dental, etc.) Or received a steroid injection from another provider within the last 2 weeks.  *have any wounds or rashes  *awaiting ANY test results that could result in you having to take antibiotics (Urine Culture, Flu/Covid/Strept Swab, etc)     If you have been COVID positive, you will need to hold off on your procedure until you are symptom free for 10 days. If you did not have any symptoms, you can have your procedure 10 days from your positive test result.      On the morning of your procedure:  *HOLD ALL VITAMINS, MINERALS, HERBS (INCLUDING HERBAL TEAS) AND SUPPLEMENTS  *SHOWER WITH ANTIBACTERIAL SOAP (example: DIAL over the counter) NIGHT BEFORE AND MORNING OF PROCEDURE  *DO NOT APPLY ANY  LOTIONS, OILS, POWDERS, PERFUME/COLOGNE, OINTMENTS, GELS, CREAMS, MAKEUP OR DEODORANT TO YOUR SKIN MORNING OF PROCEDURE  *LEAVE JEWELRY AND ANY VALUABLES AT HOME  *WEAR LOOSE COMFORTABLE CLOTHING      If you have any questions please call [phone].    Please reply to this portal message as receipt of delivery.     Thank you,  [facility]  [place], LPN [facility]  Pre-Admit

## 2025-01-06 NOTE — PRE-PROCEDURE INSTRUCTIONS
Patient reviewed on 1/3/2024.  Okay to proceed at [facility]. The following pre-procedure instructions and arrival time have been reviewed with patient via phone and sent to patient portal for review.  Patient verbalized an understanding.        Dear Tyrell,     Please read over the following pre-procedure instructions in it's entirety as there is helpful information here to get you well prepared for your upcoming procedure.     You are scheduled for a procedure with Dr. Gamble on 1/7/2024.     [facility] at the corner of [facility] and [facility]. It is in the [facility] [employer] [place] next to [facility]. The address is: [address] [place] [address]. Take the elevator to the 2nd floor.       Registration check in time: 11:30 am  Procedure scheduled for time: 1:30 pm     If you are receiving sedation, you CANNOT drive yourself and must have a responsible friend or family member (no rideshare) to drive you home.        You should take any medications that you routinely take for blood pressure, heart medications, thyroid, cholesterol, etc.      The fasting restrictions are dependent on whether or not you are receiving sedation. Sedation is not available for all procedures.      Your fasting instructions/Sedation type are as follow:  No sedation.  You do not need to fast before this procedure.  You can eat and drink like normal.  You can drive yourself (with stipulations).  There are some rare cases where you may need to call an uber if you are unable to drive after the procedure due to weakness/dizziness.         If you are on blood thinners, you need to follow the anticoagulation instructions that had been discussed previously. You should only stop the blood thinners if it was approved by your primary care physician or your cardiologist. In the event that you are not able to stop your blood thinners, a blood thinner was not listed on your medication list, or we were not able to get clearance from your  cardiologist, then the procedure may have to be postponed/canceled.      IF you were told to stop your blood thinners, this is how long you should generally hold some of the more common ones. Remember that stopping blood thinners is only necessary for certain procedures. If you are unsure of your instructions, please call us.   Aspirin - 5 days  Plavix/Clopidogrel - 7 days  Warfarin / Coumadin - 5 days  Eliquis - 3 days  Pradaxa/Dabigatran - 4 days  Xarelto/Rivaroxaban - 3 days     *If you take Ozempic, Trulicity, Mounjaro, Rybelsus, Zepbound Or other weight loss, non-insulin injections you must hold this for one week (7 days) prior if you are having IV sedation.      If you are a diabetic, do not take your medication if you will be fasting, but bring it with you. Please plan on being here for roughly 2-3 hours.      Please call us if any of the following have occurred:  *running fever or having any flu-like symptoms  *have been taking antibiotics in the past 2 weeks  *have had any or plan on having any immunizations in the 2 weeks before or after your injection(Flu/Shingles/Covid Booster/Pneumonia, etc.)  *had any out patient procedures other than with us in the past 2 weeks (Colonoscopy, Endoscopy, Biopsy, OBGYN, Dental, etc.) Or received a steroid injection from another provider within the last 2 weeks.  *have any wounds or rashes  *awaiting ANY test results that could result in you having to take antibiotics (Urine Culture, Flu/Covid/Strept Swab, etc)     If you have been COVID positive, you will need to hold off on your procedure until you are symptom free for 10 days. If you did not have any symptoms, you can have your procedure 10 days from your positive test result.      On the morning of your procedure:  *HOLD ALL VITAMINS, MINERALS, HERBS (INCLUDING HERBAL TEAS) AND SUPPLEMENTS  *SHOWER WITH ANTIBACTERIAL SOAP (example: DIAL over the counter) NIGHT BEFORE AND MORNING OF PROCEDURE  *DO NOT APPLY ANY  LOTIONS, OILS, POWDERS, PERFUME/COLOGNE, OINTMENTS, GELS, CREAMS, MAKEUP OR DEODORANT TO YOUR SKIN MORNING OF PROCEDURE  *LEAVE JEWELRY AND ANY VALUABLES AT HOME  *WEAR LOOSE COMFORTABLE CLOTHING      If you have any questions please call [phone].    Please reply to this portal message as receipt of delivery.     Thank you,  [facility]  [place], LPN [facility]  Pre-Admit

## 2025-01-07 ENCOUNTER — HOSPITAL ENCOUNTER (OUTPATIENT)
Facility: HOSPITAL | Age: 75
Discharge: HOME | End: 2025-01-07
Attending: STUDENT IN AN ORGANIZED HEALTH CARE EDUCATION/TRAINING PROGRAM | Admitting: STUDENT IN AN ORGANIZED HEALTH CARE EDUCATION/TRAINING PROGRAM
Payer: MEDICARE

## 2025-01-07 VITALS
TEMPERATURE: 98 F | SYSTOLIC BLOOD PRESSURE: 132 MMHG | DIASTOLIC BLOOD PRESSURE: 73 MMHG | RESPIRATION RATE: 18 BRPM | WEIGHT: 180 LBS | HEART RATE: 54 BPM | OXYGEN SATURATION: 95 % | HEIGHT: 68 IN | BODY MASS INDEX: 27.28 KG/M2

## 2025-01-07 DIAGNOSIS — M54.16 LUMBAR RADICULOPATHY: Primary | ICD-10-CM

## 2025-01-07 LAB — POCT GLUCOSE: 127 MG/DL (ref 70–110)

## 2025-01-07 PROCEDURE — 62323 NJX INTERLAMINAR LMBR/SAC: CPT | Mod: ,,, | Performed by: STUDENT IN AN ORGANIZED HEALTH CARE EDUCATION/TRAINING PROGRAM

## 2025-01-07 PROCEDURE — 62323 NJX INTERLAMINAR LMBR/SAC: CPT | Performed by: STUDENT IN AN ORGANIZED HEALTH CARE EDUCATION/TRAINING PROGRAM

## 2025-01-07 PROCEDURE — 82962 GLUCOSE BLOOD TEST: CPT | Performed by: STUDENT IN AN ORGANIZED HEALTH CARE EDUCATION/TRAINING PROGRAM

## 2025-01-07 PROCEDURE — 63600175 PHARM REV CODE 636 W HCPCS: Performed by: STUDENT IN AN ORGANIZED HEALTH CARE EDUCATION/TRAINING PROGRAM

## 2025-01-07 PROCEDURE — 25500020 PHARM REV CODE 255: Performed by: STUDENT IN AN ORGANIZED HEALTH CARE EDUCATION/TRAINING PROGRAM

## 2025-01-07 RX ORDER — DEXAMETHASONE SODIUM PHOSPHATE 10 MG/ML
INJECTION INTRAMUSCULAR; INTRAVENOUS
Status: DISCONTINUED | OUTPATIENT
Start: 2025-01-07 | End: 2025-01-07 | Stop reason: HOSPADM

## 2025-01-07 RX ORDER — LIDOCAINE HYDROCHLORIDE 20 MG/ML
INJECTION, SOLUTION EPIDURAL; INFILTRATION; INTRACAUDAL; PERINEURAL
Status: DISCONTINUED | OUTPATIENT
Start: 2025-01-07 | End: 2025-01-07 | Stop reason: HOSPADM

## 2025-01-07 NOTE — PLAN OF CARE
Discharge instructions given to patient, verbalized understanding of all. VSS, denies n/v and tolerating PO, rates pain level tolerable. IV removed, and Family notified for Patient discharge home.

## 2025-01-07 NOTE — DISCHARGE SUMMARY
[facility]  Discharge Note  Short Stay    Procedure(s) (LRB):  caudal NATAN w/cath (toward the right - low volume to L5) (N/A)      OUTCOME: Patient tolerated treatment/procedure well without complication and is now ready for discharge.    DISPOSITION: Home or Self Care    FINAL DIAGNOSIS:  <principal problem not specified>    FOLLOWUP: In clinic    DISCHARGE INSTRUCTIONS:  No discharge procedures on file.     TIME SPENT ON DISCHARGE: 10 minutes

## 2025-01-07 NOTE — DISCHARGE INSTRUCTIONS
[facility]  [facility] service # [phone]  On-call pager for emergency# [phone]     POST-PROCEDURE INSTRUCTIONS:    Today you had an injection that included a steroid medications.  The steroid may or may not have been mixed with a local anesthetic when it was injected.   If the injection was in the neck, you may feel some pressure, numbness, or slight weakness in the arm after the procedure for a short period of time (this is a normal response), if this persists for longer than 1 day please contact our office or go to the emergency room.  If the injection was in the low back, you may feel some pressure, numbness, or slight weakness in the leg after the procedure for a short period of time (this is a normal response), if this persists for longer than 1 day please contact our office or go to the emergency room.  You may get side effects from the steroid.  This is not uncommon.  Symptoms include: elevated blood sugar, elevated blood pressure, headache, flushing, nausea, insomnia.  These symptoms are transient and will resolve within 1-3 days.  If symptoms last longer than this please contact our office or head to the emergency room.  Steroid medications can take anywhere from 3-14 days to take effect (rarely longer).  You may notice that your pain worsens for a short period of time after the injection, this would not be unusual due to the pressure and trauma from the needle.    If you do not have a follow up appointment scheduled, please contact my office (or the office of the physician who referred you for the procedure) to get a post-procedure follow up scheduled 2-4 weeks after the procedure.  This can be done as a virtual visit if that is more convenient for you.      What you need to do:    Keep a record of your response to the injection you had today.    How much relief did you get?   When did the relief start and how long did it last?  Were you  able to decrease the use of any of your pain medications?  Were you able to increase your level of activity?  How long did the relief last?    What to watch out for:    If you experience any of the following symptoms after your procedure, please notify the messaging service immediately (see above for contact information):   fever (increased oral temperature)   bleeding or swelling at the injection site,    drainage, rash or redness at the injection site    possible signs of infection    increased pain at the injection site   worsening of your usual pain   severe headache   new or worsening numbness    new arm and/or leg weakness, or    changes in bowel and/or bladder function: urinating or defecating on yourself and not knowing that you did it.    PLEASE FOLLOW ALL INSTRUCTIONS CAREFULLY     Do not engage in strenuous activity (e.g., lifting or pushing heavy objects or repeated bending) for 24 hours.     Do not take a bath, swim or use Jacuzzi for 24 hours after procedure. (A shower is fine).   Remove any Band-Aids when you get home.    Use cold/ice, as needed for comfort.  We recommend the use of cold therapy alternating on for 20 minutes, off for 20 minutes.    Do not apply direct heat (heating pad or heat packs) to the injection site for 24 hours.     Resume your usual medications, unless instructed otherwise by your Pain Physician.     If you are on warfarin (Coumadin) or other blood thinner, resume this medication as instructed by your prescribing Physician.    IF AT ANY POINT YOU ARE VERY CONCERNED ABOUT YOUR SYMPTOMS, PLEASE GO TO THE EMERGENCY ROOM.    If you develop worsening pain, weakness, numbness, lose bowel or bladder control (i.e., having an accident where you did not even know you had to go to the bathroom and suddenly noticed you soiled yourself), saddle anesthesia (a loss of sensation restricted to the area of the buttocks, anus and between the legs -- i.e., those parts of your body that would  touch a saddle if you were sitting on one) you need to go immediately to the emergency department for evaluation and treatment.    ----------------------------------------------------------------------------------------------------------------------------------------------------------------  If you received Sedation please read the following instructions:  POST SEDATION INSTRUCTIONS    Today you received intravenous medication (also known as sedation) that was used to help you relax and/or decrease discomfort during your procedure. This medication will be acting in your body for the next 24 hours, so you might feel a little tired or sleepy. This feeling will slowly wear off.   Common side effects associated with these medications include: drowsiness, dizziness, sleepiness, confusion, feeling excited, difficulty remembering things, lack of steadiness with walking or balance, loss of fine muscle control, slowed reflexes, difficulty focusing, and blurred vision.  Some over-the-counter and prescription medications (e.g., muscle relaxants, opioids, mood-altering medications, sedatives/hypnotics, antihistamines) can interact with the intravenous medication you received and cause an increased risk of the side effects listed above in addition to other potentially life threatening side effects. Use extreme caution if you are taking such medications, and consult with your Pain Physician or prescribing physician if you have any questions.  For the next 12-24 hours:    DO NOT--Drive a car, operate machinery or power tools   DO NOT--Drink any alcoholic beverages (not even beer), they may dangerously increase the risk of side effects.    DO NOT--Make any important legal or business decisions or sign important documents.  We advise you to have someone to assist you at home. Move slowly and carefully. Do not make sudden changes in position. Be aware of dizziness or light-headedness and move accordingly.   If you seek medical  treatment within 24 hours, let the nurse or doctor caring for you know that you have received the above medications. If you have any questions or concerns related to your sedation or treatment today please contact us.

## 2025-01-07 NOTE — OP NOTE
"PROCEDURE:  CAUDAL EPIDURAL STEROID INJECTION with CATHETER UNDER FLUOROSCOPIC GUIDANCE    PATIENT NAME: Bryant King   MRN: [id number]     DATE OF PROCEDURE: 01/07/2025    Patient Name: Bryant King  MRN: [id number]  DATE OF PROCEDURE: 01/07/2025    INJECTION # 1 THIS YEAR    DIAGNOSIS: Lumbar Radiculopathy  CPT CODE: 10766      POSTPROCEDURE DIAGNOSIS: Same    PHYSICIAN: Micheal Gamble DO  NEEDLE TYPE: - 18G 3.5" Touhy Needle with Catheter  MEDICATIONS INJECTED: 4cc mixture of 3cc Normal Saline + 10mg Dexamethasone (10mg/ml)  CONTRAST: Omni 300    Sedation Medications - None    Estimated Blood Loss - <2ml  Drains: None  Specimens Removed: None  Urine Output - Not Measured  Complications: None  Outcome: Good    Informed Consent:  The patient's condition and proposed procedures, risks, and alternatives were discussed with the patient or responsible party.  The patient's / responsible party's questions were answered.   The patient / responsible party appeared to understand and chose to proceed.  Informed consent was obtained.  After obtaining written consent, an IV hep lock was placed. (See nurses notes for details).     Procedural Pause:  A procedural pause verifying correct patient, medical record number, allergies, medications to be administered, current vital signs, and surgical site was performed immediately prior to beginning the procedure.    Procedure in Detail:  The patient was taken back to the OR suite and placed in a prone position. The skin overlying the injection site was prepped and draped in an aseptic fashion. The target injection site (see above) was identified with fluoroscopy and marked.   Skin anesthesia was achieved by injecting 7ml of Lidocaine 1% over the injection site with a 25-gauge 1.5" needle. The above documented needle was then introduced into the epidural space via the sacral hiatus. After the needle passed through the sacrococcygeal ligament, the needle angle was lowered and " the needle was advanced 1 cm. After negative aspiration of blood or CSF, and no evidence of paraesthesias, the catheter was threaded through the needle into the epidural space using live fluoroscopy, contrast dye Omnipaque (300mg/ml) was injected to confirm placement and there was no vascular runoff. Fluoroscopic imaging in the AP and lateral views revealed a clear outline of the spinal nerve with proximal spread of agent through the caudal epidural space. Then 4 mL of the medication mixture listed above was injected slowly. Displacement of the radio opaque contrast after injection of the medication confirmed that the medication went into the area of the transforaminal spaces. The needles were removed and bleeding was nil. A sterile dressing was applied. No specimens collected. The patient tolerated the procedure well.     The heart rate, pulse oximetry, and blood pressure were continuously monitored throughout the procedure.  The procedure was well tolerated. He was carefully escorted to the recovery room in stable condition. Patient was monitored by RN for recovery period.  The patient will be contacted in the next few days to determine extent of relief.  Patient was given post procedure and discharge instructions to follow at home.  The patient was discharged in a stable condition.    Electronically signed by:  Micheal Barton  01/07/2025

## 2025-01-28 ENCOUNTER — OFFICE VISIT (OUTPATIENT)
Dept: NEUROSURGERY | Facility: CLINIC | Age: 75
End: 2025-01-28
Payer: MEDICARE

## 2025-01-28 DIAGNOSIS — M54.16 LUMBAR RADICULOPATHY: Primary | ICD-10-CM

## 2025-01-28 DIAGNOSIS — M48.062 SPINAL STENOSIS OF LUMBAR REGION WITH NEUROGENIC CLAUDICATION: ICD-10-CM

## 2025-01-28 DIAGNOSIS — M67.40 GANGLION CYST: ICD-10-CM

## 2025-01-28 PROCEDURE — 99203 OFFICE O/P NEW LOW 30 MIN: CPT | Mod: S$GLB,,, | Performed by: STUDENT IN AN ORGANIZED HEALTH CARE EDUCATION/TRAINING PROGRAM

## 2025-01-28 PROCEDURE — 99999 PR PBB SHADOW E&M-EST. PATIENT-LVL III: CPT | Mod: PBBFAC,,, | Performed by: STUDENT IN AN ORGANIZED HEALTH CARE EDUCATION/TRAINING PROGRAM

## 2025-01-28 PROCEDURE — 1125F AMNT PAIN NOTED PAIN PRSNT: CPT | Mod: CPTII,S$GLB,, | Performed by: STUDENT IN AN ORGANIZED HEALTH CARE EDUCATION/TRAINING PROGRAM

## 2025-01-28 PROCEDURE — 1101F PT FALLS ASSESS-DOCD LE1/YR: CPT | Mod: CPTII,S$GLB,, | Performed by: STUDENT IN AN ORGANIZED HEALTH CARE EDUCATION/TRAINING PROGRAM

## 2025-01-28 PROCEDURE — 3288F FALL RISK ASSESSMENT DOCD: CPT | Mod: CPTII,S$GLB,, | Performed by: STUDENT IN AN ORGANIZED HEALTH CARE EDUCATION/TRAINING PROGRAM

## 2025-01-28 PROCEDURE — 1159F MED LIST DOCD IN RCRD: CPT | Mod: CPTII,S$GLB,, | Performed by: STUDENT IN AN ORGANIZED HEALTH CARE EDUCATION/TRAINING PROGRAM

## 2025-01-28 NOTE — PROGRESS NOTES
Neurosurgery  History & Physical    SUBJECTIVE:     Chief Complaint: Low back and right buttock pain    History of Present Illness:  74 M presents for eval of chronic right sided low back and right buttock pain.  He also describes tingling in his right calf and numbness in his great toe.  His back and buttock pain bother him more with activity.  Prior to his NATAN his pain level was constantly around 6-7/10 and now following it is around 2/10.  He has been to PT in the past with limited relief of his pain.    Review of patient's allergies indicates:  No Known Allergies    Current Outpatient Medications   Medication Sig Dispense Refill    atorvastatin (LIPITOR) 80 MG tablet TAKE 1 TABLET EVERY DAY 90 tablet 3    carvediloL (COREG) 12.5 MG tablet TAKE 1 TABLET TWICE DAILY OR AS DIRECTED 180 tablet 3    ciclopirox (LOPROX) 0.77 % Crea Apply topically.      fluticasone propionate (FLONASE) 50 mcg/actuation nasal spray 1 spray by Nasal route daily as needed.      gabapentin (NEURONTIN) 300 MG capsule TAKE 2 CAPSULES BY MOUTH 3 TIMES A DAY  2    glimepiride (AMARYL) 2 MG tablet TAKE 1 TAB PO IN THE AM, TAKE 2 TABS PO IN THE PM      JARDIANCE 10 mg tablet TAKE 1 TABLET EVERY DAY 90 tablet 3    nitroGLYCERIN (NITROSTAT) 0.4 MG SL tablet DISSOLVE 1 TABLET UNDER THE TONGUE EVERY 5 MINUTES AS NEEDED FOR CHEST PAIN AS DIRECTED 25 tablet 10    omega-3 fatty acids/fish oil (FISH OIL-OMEGA-3 FATTY ACIDS) 300-1,000 mg capsule Take by mouth once daily.      rizatriptan (MAXALT) 10 MG tablet Take 10 mg by mouth as needed for Migraine.      ALPRAZolam (XANAX) 0.5 MG tablet Take 0.5 mg by mouth 2 (two) times daily as needed.  (Patient not taking: Reported on 1/28/2025)      amoxicillin (AMOXIL) 500 MG Tab TAKE 4 TABLETS (2000 MG) 1 HOUR PRIOR TO DENTAL WORK AS NEEDED (Patient not taking: Reported on 1/28/2025) 12 tablet 10    aspirin (ECOTRIN) 81 MG EC tablet Take 1 tablet (81 mg total) by mouth once daily. 30 tablet 0    ezetimibe  (ZETIA) 10 mg tablet TAKE 1 TABLET EVERY DAY 90 tablet 3     Current Facility-Administered Medications   Medication Dose Route Frequency Provider Last Rate Last Admin    acetaminophen tablet 650 mg  650 mg Oral Once PRN Kurtis Chaidez MD        albuterol inhaler 2 puff  2 puff Inhalation Q20 Min PRN Kurtis Chaidez MD        diphenhydrAMINE injection 25 mg  25 mg Intravenous Once PRKurtis Kirkland MD        EPINEPHrine (EPIPEN) 0.3 mg/0.3 mL pen injection 0.3 mg  0.3 mg Intramuscular PRN Kurtis Chaidez MD        methylPREDNISolone sodium succinate injection 40 mg  40 mg Intravenous Once PRN Kurtis Chaidez MD        ondansetron disintegrating tablet 4 mg  4 mg Oral Once PRN Kurtis Chaidez MD        sodium chloride 0.9% 500 mL flush bag   Intravenous PRN Kurtis Chaidez MD        sodium chloride 0.9% flush 10 mL  10 mL Intravenous PRN Kurtis Chaidez MD           Past Medical History:   Diagnosis Date    Coronary artery disease     Hyperlipidemia     Hypertension     Migraines     Type 2 diabetes mellitus without complication, without long-term current use of insulin      Past Surgical History:   Procedure Laterality Date    AORTIC VALVE REPLACEMENT      back fusion      CORONARY ARTERY BYPASS GRAFT  02/08/2012    EPIDURAL STEROID INJECTION INTO LUMBAR SPINE N/A 1/7/2025    Procedure: caudal NATAN w/cath (toward the right - low volume to L5);  Surgeon: Micheal Gamble DO;  Location: [facility];  Service: Pain Management;  Laterality: N/A;  ASA ok    HERNIA REPAIR      umbilical hernia repair with possible mesh    laparoscopic hiatal hernia repair with mesh      PATELLA FRACTURE SURGERY      TONSILLECTOMY       Family History       Problem Relation (Age of Onset)    Heart disease Brother    Hypertension Mother, Father          Social History     Socioeconomic History    Marital status:    Tobacco Use    Smoking status: Never     Passive exposure: Never     Smokeless tobacco: Never   Substance and Sexual Activity    Alcohol use: No     Social Drivers of Health     Financial Resource Strain: Low Risk  (11/5/2024)    Overall Financial Resource Strain (CARDIA)     Difficulty of Paying Living Expenses: Not hard at all   Food Insecurity: No Food Insecurity (11/5/2024)    Hunger Vital Sign     Worried About Running Out of Food in the Last Year: Never true     Ran Out of Food in the Last Year: Never true   Physical Activity: Sufficiently Active (11/5/2024)    Exercise Vital Sign     Days of Exercise per Week: 4 days     Minutes of Exercise per Session: 60 min   Stress: No Stress Concern Present (11/5/2024)    [employer] [place] [employer] - Occupational Stress Questionnaire     Feeling of Stress : Not at all   Housing Stability: Unknown (11/5/2024)    Housing Stability Vital Sign     Unable to Pay for Housing in the Last Year: No       Review of Systems  14 point ROS was negative    OBJECTIVE:     Vital Signs  Pain Score:   5  There is no height or weight on file to calculate BMI.      Physical Exam:    Constitutional: He appears well-developed and well-nourished.     Eyes: Pupils are equal, round, and reactive to light.     Cardiovascular: Normal rate and regular rhythm.     Abdominal: Soft.     Psych/Behavior: He is alert. He is oriented to person, place, and time. He has a normal mood and affect.     Musculoskeletal: Gait is abnormal.        Neck: Range of motion is limited.        Back: Range of motion is limited.     Neurological:        Coordination: He has a normal Romberg Test and normal tandem walking coordination.        Sensory: There is no sensory deficit in the trunk. There is no sensory deficit in the extremities.        DTRs: DTRs are DTRS NORMAL AND SYMMETRICnormal and symmetric.        Cranial nerves: Cranial nerve(s) II, III, IV, V, VI, VII, VIII, IX, X, XI and XII are intact.     5/5 in BUE  5/5 in BLE except 4+/5 in right DF,EHL    No snow's  bilaterally    No spinal TTP.      Diagnostic Results:  MRI L spine: grade I spondy at L4/5, 5/S1.  Severe stenosis at L4/5, 5/S1.  Posteriorly located synovial cyst at L5/S1.  Bilateral facet effusions at L4/5.  Flex/ex L: stepwise listhesis L4/5, 5/S1.  There is no overt instability.  Reviewed    ASSESSMENT/PLAN:     74 M with chronic low back and right buttock pain with hx of remote noninstrumented fusion at 16 yo presents for eval.  His imaging is described above which is notable for stepwise listhesis from L4-S1 and severe stenosis.  Will plan to get CT L for preop planning.  I discussed that he would likely need a fusion as opposed to a decompression due to the degree of listhesis and the facet effusions present at L4/5.

## 2025-02-06 ENCOUNTER — HOSPITAL ENCOUNTER (OUTPATIENT)
Dept: RADIOLOGY | Facility: HOSPITAL | Age: 75
Discharge: HOME | End: 2025-02-06
Attending: STUDENT IN AN ORGANIZED HEALTH CARE EDUCATION/TRAINING PROGRAM
Payer: MEDICARE

## 2025-02-06 DIAGNOSIS — M54.16 LUMBAR RADICULOPATHY: ICD-10-CM

## 2025-02-06 PROCEDURE — 72131 CT LUMBAR SPINE W/O DYE: CPT | Mod: 26,,, | Performed by: RADIOLOGY

## 2025-02-06 PROCEDURE — 72131 CT LUMBAR SPINE W/O DYE: CPT | Mod: TC

## 2025-02-17 ENCOUNTER — PATIENT MESSAGE (OUTPATIENT)
Dept: CARDIOLOGY | Facility: CLINIC | Age: 75
End: 2025-02-17
Payer: MEDICARE

## 2025-02-20 ENCOUNTER — OFFICE VISIT (OUTPATIENT)
Dept: PAIN MEDICINE | Facility: CLINIC | Age: 75
End: 2025-02-20
Payer: MEDICARE

## 2025-02-20 VITALS
WEIGHT: 184.06 LBS | DIASTOLIC BLOOD PRESSURE: 79 MMHG | HEART RATE: 52 BPM | SYSTOLIC BLOOD PRESSURE: 116 MMHG | BODY MASS INDEX: 27.9 KG/M2 | HEIGHT: 68 IN

## 2025-02-20 DIAGNOSIS — M96.1 POSTLAMINECTOMY SYNDROME OF LUMBAR REGION: ICD-10-CM

## 2025-02-20 DIAGNOSIS — M48.062 SPINAL STENOSIS OF LUMBAR REGION WITH NEUROGENIC CLAUDICATION: ICD-10-CM

## 2025-02-20 DIAGNOSIS — M54.16 LUMBAR RADICULOPATHY: Primary | ICD-10-CM

## 2025-02-20 RX ORDER — METHOCARBAMOL 500 MG/1
1000 TABLET, FILM COATED ORAL 2 TIMES DAILY PRN
Qty: 120 TABLET | Refills: 2 | Status: SHIPPED | OUTPATIENT
Start: 2025-02-20 | End: 2025-05-21

## 2025-02-20 NOTE — PROGRESS NOTES
Chronic Pain - f/u    Referring Physician: No ref. provider found    Date: 02/20/2025     Re: Bryant King  MR#: [id number]  YOB: 1950  Age: 74 y.o.    Chief Complaint:   Chief Complaint   Patient presents with    Back Pain    Leg Pain     **This note is dictated using the M*Modal Fluency Direct word recognition program. There are word recognition mistakes that are occasionally missed on review.**    ASSESSMENT: 74 y.o. year old male with back and leg pain, consistent with     1. Lumbar radiculopathy  methocarbamoL (ROBAXIN) 500 MG Tab      2. Postlaminectomy syndrome of lumbar region  methocarbamoL (ROBAXIN) 500 MG Tab      3. Spinal stenosis of lumbar region with neurogenic claudication  methocarbamoL (ROBAXIN) 500 MG Tab        PLAN:     Post laminectomy syndrome with right leg pain/radiculopathy  -MRI reviewed with patient.  He has several significant findings that are probably causing the pain/weakness that he is complaining of in the right hip.  He has severe foraminal stenosis at L4-5 and L5-S1, severe spinal stenosis at both levels, and a cyst at L5-S1 that is probably compressing the spinal cord.   -Neurosurgery referral to discuss options  1/7/25 - caudal NATAN w/cath (toward the right - low volume to L5) - no sed - ASA ok - 60%@1.5m  -repeat caudal NATAN as needed  -TRIAL methocarbamol 500-1000mg BID-QID  -continue HEP and stretching program    Right hip abductor weakness  -MRI hip shows mild arthritis and glut muscle atrophy  -glut muscle atrophy likely related to severe stenosis in the back  -suspect this is coming from the back    - RTC 2 months  - Counseled patient regarding the importance of activity modification and physical therapy.    The above plan and management options were discussed at length with patient. Patient is in agreement with the above and verbalized understanding. It will be communicated with the referring physician via electronic record, fax, or mail.  Lab/study reports  "reviewed were important and necessary because subsequent medical and treatment recommendations required review of the above lab/study reports. Images viewed/reviewed above were important and necessary because subsequent medical and treatment recommendations required review of the reviewed image(s).     Electronically signed by:  Micheal Gamble DO  02/20/2025    =========================================================================================================    SUBJECTIVE:    Interval History 2/20/2025:   Bryant King is a 74 y.o. male presents to the clinic for follow up.  Since last visit the pain has has slightly improved.  The patient saw Dr. Boss and was recommended to have surgery. He feels like the last caudal was really helpful for the back pain but the right glut "tightness" is the worst area.  He continues to stretch a lot and work on the core.    The pain is located in the right buttocks/ low back area and radiates to the right hip .  The pain is described as aching and sharp    At BEST  5/10   At WORST  8/10 on the WORST day.    On average pain is rated as 5/10.   Today the pain is rated as 5/10  Symptoms interfere with daily activity.   Exacerbating factors: Sitting and Walking.    Mitigating factors nothing.     Current pain medications: gabapentin 600mg qam and 300mg qpm, fish oil  Failed Pain Medications: cannot take NSAIDs due to heart    Pain procedures:  1/7/25 - caudal NATAN w/cath (toward the right - low volume to L5) - no sed - ASA ok    Interval History 12/11/2024:   Bryant King is a 74 y.o. male presents to the clinic for follow up.  Since last visit the pain has is unchanged.  His pain is mostly in the right buttocks that is worse with ambulation.  Better with rest.    The pain is located in the right buttocks/low back area and radiates to the right hip .  The pain is described as aching and sharp    At BEST  5/10   At WORST  8/10 on the WORST day.    On average " pain is rated as 5/10.   Today the pain is rated as 6/10  Symptoms interfere with daily activity.   Exacerbating factors: Sitting and Walking.    Mitigating factors nothing.     Initial hx:  Bryant King is a 74 y.o. male presents to the clinic for the evaluation of lower right back pain. The pain started 3-4 years ago following no inciting event and symptoms have been worsening.  The patient has had backpain since he was 15 when he had to have a fusion.  He states that the back pain has been getting worse.  He completed PT about 2 months ago which helps some.  The right side just never relaxes.  He cannot play golf without stopping every 1-2 holes to stretch.  The pain is not excruciating, but very aggravating.  He does not have much pain with sitting.  Not much pain with laying down. But he does wake up in the middle of the night to stretch. It hurts the most when he is walking.    Pain Description:    The pain is located in the lower back right side area and radiates to the right leg and foot .    At BEST  5/10   At WORST  8/10 on the WORST day.    On average pain is rated as 5/10.   Today the pain is rated as 5/10  The pain is continuous.  The pain is described as aching, tight band, and stiffness, numbness     Symptoms interfere with daily activity.   Exacerbating factors: Sitting.    Mitigating factors nothing.   He reports 6-7 hours of sleep per night.    Physical Therapy/Home Exercise: No, not currently in physical therapy or home exercise program    Current Pain Medications:    - gabapentin 600mg qam and 300mg qpm, fish oil    Failed Pain Medications:    - cannot take NSAIDs due to heart    Pain Treatment Therapies:    Pain procedures: has had something but not sure  Physical Therapy: yes  Chiropractor: none  Acupuncture: none  TENS unit: none  Spinal decompression:   Fusion surgery when he was 15years old. Maybe L5  Joint replacement: none    Patient denies urinary incontinence and bowel  incontinence.  Patient denies any suicidal or homicidal ideations     report:  Reviewed and consistent with medication use as prescribed.    Imaging:   XR lumbar 2019:    Slight convex right curvature lumbar spine there is grade 1 retrolisthesis of L3 on L4 and grade 1 anterolisthesis of L4 on L5.  No evidence for spondylolysis.  There is scattered endplate degeneration allowing for degenerative change the lumbar vertebral body heights and contours are within normal is without evidence for acute fracture.  Tortuous atherosclerotic aorta.  Further evaluation as warranted clinically..         2/20/2025     9:43 AM 11/14/2024     3:56 PM   Pain Disability Index (PDI)   Family/Home Responsibilities: 8 5   Recreation: 8 5   Occupation: 8 5   Sexual Behavior: 8 5   Self Care: 8 5   Life-Support Activities: 8 5   Pain Disability Index (PDI) 56 35        Past Medical History:   Diagnosis Date    Coronary artery disease     Hyperlipidemia     Hypertension     Migraines     Type 2 diabetes mellitus without complication, without long-term current use of insulin      Past Surgical History:   Procedure Laterality Date    AORTIC VALVE REPLACEMENT      back fusion      CORONARY ARTERY BYPASS GRAFT  02/08/2012    EPIDURAL STEROID INJECTION INTO LUMBAR SPINE N/A 1/7/2025    Procedure: caudal NATAN w/cath (toward the right - low volume to L5);  Surgeon: Micheal Gamble DO;  Location: [facility];  Service: Pain Management;  Laterality: N/A;  ASA ok    HERNIA REPAIR      umbilical hernia repair with possible mesh    laparoscopic hiatal hernia repair with mesh      PATELLA FRACTURE SURGERY      TONSILLECTOMY       Social History     Socioeconomic History    Marital status:    Tobacco Use    Smoking status: Never     Passive exposure: Never    Smokeless tobacco: Never   Substance and Sexual Activity    Alcohol use: No     Social Drivers of Health     Financial Resource Strain: Low Risk  (11/5/2024)    Overall  Financial Resource Strain (CARDIA)     Difficulty of Paying Living Expenses: Not hard at all   Food Insecurity: No Food Insecurity (11/5/2024)    Hunger Vital Sign     Worried About Running Out of Food in the Last Year: Never true     Ran Out of Food in the Last Year: Never true   Physical Activity: Sufficiently Active (11/5/2024)    Exercise Vital Sign     Days of Exercise per Week: 4 days     Minutes of Exercise per Session: 60 min   Stress: No Stress Concern Present (11/5/2024)    [employer] - Occupational Stress Questionnaire     Feeling of Stress : Not at all   Housing Stability: Unknown (11/5/2024)    Housing Stability Vital Sign     Unable to Pay for Housing in the Last Year: No     Family History   Problem Relation Name Age of Onset    Hypertension Mother      Hypertension Father      Heart disease Brother      Heart attack Neg Hx         Review of patient's allergies indicates:  No Known Allergies    Current Outpatient Medications   Medication Sig    ALPRAZolam (XANAX) 0.5 MG tablet Take 0.5 mg by mouth 2 (two) times daily as needed.    amoxicillin (AMOXIL) 500 MG Tab TAKE 4 TABLETS (2000 MG) 1 HOUR PRIOR TO DENTAL WORK AS NEEDED    atorvastatin (LIPITOR) 80 MG tablet TAKE 1 TABLET EVERY DAY    carvediloL (COREG) 12.5 MG tablet TAKE 1 TABLET TWICE DAILY OR AS DIRECTED    ciclopirox (LOPROX) 0.77 % Crea Apply topically.    ezetimibe (ZETIA) 10 mg tablet TAKE 1 TABLET EVERY DAY    fluticasone propionate (FLONASE) 50 mcg/actuation nasal spray 1 spray by Nasal route daily as needed.    gabapentin (NEURONTIN) 300 MG capsule TAKE 2 CAPSULES BY MOUTH 3 TIMES A DAY    glimepiride (AMARYL) 2 MG tablet TAKE 1 TAB PO IN THE AM, TAKE 2 TABS PO IN THE PM    JARDIANCE 10 mg tablet TAKE 1 TABLET EVERY DAY    nitroGLYCERIN (NITROSTAT) 0.4 MG SL tablet DISSOLVE 1 TABLET UNDER THE TONGUE EVERY 5 MINUTES AS NEEDED FOR CHEST PAIN AS DIRECTED    omega-3 fatty acids/fish oil (FISH OIL-OMEGA-3 FATTY  "ACIDS) 300-1,000 mg capsule Take by mouth once daily.    rizatriptan (MAXALT) 10 MG tablet Take 10 mg by mouth as needed for Migraine.    aspirin (ECOTRIN) 81 MG EC tablet Take 1 tablet (81 mg total) by mouth once daily.    methocarbamoL (ROBAXIN) 500 MG Tab Take 2 tablets (1,000 mg total) by mouth 2 (two) times daily as needed (pain and spasms).     Current Facility-Administered Medications   Medication    acetaminophen tablet 650 mg    albuterol inhaler 2 puff    diphenhydrAMINE injection 25 mg    EPINEPHrine (EPIPEN) 0.3 mg/0.3 mL pen injection 0.3 mg    methylPREDNISolone sodium succinate injection 40 mg    ondansetron disintegrating tablet 4 mg    sodium chloride 0.9% 500 mL flush bag    sodium chloride 0.9% flush 10 mL       REVIEW OF SYSTEMS:    GENERAL:  No weight loss, malaise or fevers.:+weight loss  HEENT:   No recent changes in vision or hearing:NO  NECK:  Negative for lumps, no difficulty with swallowing.:NO  RESPIRATORY:  Negative for cough, wheezing or shortness of breath, patient denies any recent URI.:NO  CARDIOVASCULAR:  Negative for chest pain, leg swelling or palpitations.:+leg swelling  GI:  Negative for abdominal discomfort, blood in stools or black stools or change in bowel habits.:NO  MUSCULOSKELETAL:  See HPI.  SKIN:  Negative for lesions, rash, and itching.:NO  PSYCH:  No mood disorder or recent psychosocial stressors.  Patients sleep is not disturbed secondary to pain.:NO  HEMATOLOGY/LYMPHOLOGY:  Negative for prolonged bleeding, bruising easily or swollen nodes.  Patient is not currently taking any anti-coagulants:+low dose aspirin  NEURO:   No history of headaches, syncope, paralysis, seizures or tremors.:+migraine  All other reviewed and negative other than HPI.    OBJECTIVE:    /79 (BP Location: Left arm, Patient Position: Sitting)   Pulse (!) 52   Ht 5' 8" (1.727 m)   Wt 83.5 kg (184 lb 1.4 oz)   BMI 27.99 kg/m²     PHYSICAL EXAMINATION:    GENERAL: Well appearing, in no " acute distress, alert and oriented x3.  PSYCH:  Mood and affect appropriate.  SKIN: Skin color, texture, turgor normal, no rashes or lesions.  HEAD/FACE:  Normocephalic, atraumatic. Cranial nerves grossly intact.    CV: RRR with palpation of the radial artery.  PULM: CTAB. No evidence of respiratory difficulty, symmetric chest rise.  GI:  Soft and non-tender.    BACK:   - No obvious deformity or signs of trauma, Normal lumbar lordotic curve  - Negative spinous process tenderness  - Positive paravertebral tenderness  - Negative pain to palpation over the facet joints of the lumbar spine.   - Negative QL / Iliac crest / Glut tenderness  - Slump test is Negative for radicular pain  - Slump test is Negative for back pain  - Supine Straight leg raising is Positive for radicular pain to the right buttock  - Supine Straight leg raising is Negative for back pain  - Lumbar ROM is normal in Flexion without pain  - Lumbar ROM is normal in Extension without pain  - Lumbar ROM is normal in Lateral Flexion without pain  - Lumbar ROM is normal in Rotation without pain  - Negative Sustained Hip Flexion test (for discogenic pain)  - Positive Altered Gait, Posture  - Axial facet loading test Negative on the bilateral side(s)    SI Joint exam:  - Negative SI joint tenderness to palpation  - Moisés's sign Negative  - Yeoman's Test: Did not perform for SI joint pain indicating anterior SI ligament involvement. Did not perform for anterior thigh pain/paresthesia which indicates femoral nerve stretch.  - Gaenslen's Test:Negative  - Finger Kirstie's Sign:Negative  - SI compression test:Negative  - SI distraction test:Negative  - Thigh Thrust: Negative  - SI Thrust: Negative    MUSKULOSKELETAL:    EXTREMITIES:   Hip Exam:  - Log Roll Negative  - FADIR Negative  - Stinchfield Negative  - Hip Scour Negative  - GTB Tenderness Negative      MUSCULOSKELETAL:  No atrophy or tone abnormalities are noted in the UE or LE.  No deformities, edema, or  skin discoloration are noted on visible skin. Good capillary refill.    NEURO: Bilateral upper and lower extremity coordination and muscle stretch reflexes are physiologic and symmetric.      NEUROLOGICAL EXAM:  MENTAL STATUS: A x O x 3, good concentration, speech is fluent and goal directed  MEMORY: recent and remote are intact  CN: CN2-12 grossly intact  MOTOR: 5/5 in all muscle groups  DTRs: 2+ intact symmetric  Sensation:    -no Loss of sensation in a left lower and right lower  leg  distribution.    GAIT: normal.

## 2025-02-28 DIAGNOSIS — I25.10 CORONARY ARTERY DISEASE INVOLVING NATIVE CORONARY ARTERY OF NATIVE HEART WITHOUT ANGINA PECTORIS: ICD-10-CM

## 2025-02-28 DIAGNOSIS — E78.5 DYSLIPIDEMIA: ICD-10-CM

## 2025-03-02 RX ORDER — EZETIMIBE 10 MG/1
10 TABLET ORAL
Qty: 90 TABLET | Refills: 3 | Status: SHIPPED | OUTPATIENT
Start: 2025-03-02

## 2025-04-16 ENCOUNTER — TELEPHONE (OUTPATIENT)
Dept: PAIN MEDICINE | Facility: CLINIC | Age: 75
End: 2025-04-16
Payer: MEDICARE

## 2025-04-16 ENCOUNTER — PATIENT MESSAGE (OUTPATIENT)
Dept: PHYSICAL MEDICINE AND REHAB | Facility: CLINIC | Age: 75
End: 2025-04-16
Payer: MEDICARE

## 2025-04-16 NOTE — TELEPHONE ENCOUNTER
Contacted pt to reschedule appointment scheduled with Dr. Charles for 04/24/25, Dr. Charles will be out of town on a conference and will not be able to see patient on this day,.  I contacted pt but patient was unavailable, left message with details and asked he return call to [phone].  I am also sending a message through the portal.  - HC

## 2025-04-29 ENCOUNTER — OFFICE VISIT (OUTPATIENT)
Dept: PAIN MEDICINE | Facility: CLINIC | Age: 75
End: 2025-04-29
Payer: MEDICARE

## 2025-04-29 VITALS
DIASTOLIC BLOOD PRESSURE: 82 MMHG | SYSTOLIC BLOOD PRESSURE: 135 MMHG | HEART RATE: 51 BPM | BODY MASS INDEX: 27.74 KG/M2 | WEIGHT: 183 LBS | HEIGHT: 68 IN

## 2025-04-29 DIAGNOSIS — M54.16 LUMBAR RADICULOPATHY: Primary | ICD-10-CM

## 2025-04-29 DIAGNOSIS — M96.1 POSTLAMINECTOMY SYNDROME OF LUMBAR REGION: ICD-10-CM

## 2025-04-29 DIAGNOSIS — M48.062 SPINAL STENOSIS OF LUMBAR REGION WITH NEUROGENIC CLAUDICATION: ICD-10-CM

## 2025-04-29 PROCEDURE — 3079F DIAST BP 80-89 MM HG: CPT | Mod: CPTII,S$GLB,, | Performed by: STUDENT IN AN ORGANIZED HEALTH CARE EDUCATION/TRAINING PROGRAM

## 2025-04-29 PROCEDURE — 1125F AMNT PAIN NOTED PAIN PRSNT: CPT | Mod: CPTII,S$GLB,, | Performed by: STUDENT IN AN ORGANIZED HEALTH CARE EDUCATION/TRAINING PROGRAM

## 2025-04-29 PROCEDURE — 1159F MED LIST DOCD IN RCRD: CPT | Mod: CPTII,S$GLB,, | Performed by: STUDENT IN AN ORGANIZED HEALTH CARE EDUCATION/TRAINING PROGRAM

## 2025-04-29 PROCEDURE — 3288F FALL RISK ASSESSMENT DOCD: CPT | Mod: CPTII,S$GLB,, | Performed by: STUDENT IN AN ORGANIZED HEALTH CARE EDUCATION/TRAINING PROGRAM

## 2025-04-29 PROCEDURE — 1101F PT FALLS ASSESS-DOCD LE1/YR: CPT | Mod: CPTII,S$GLB,, | Performed by: STUDENT IN AN ORGANIZED HEALTH CARE EDUCATION/TRAINING PROGRAM

## 2025-04-29 PROCEDURE — 3075F SYST BP GE 130 - 139MM HG: CPT | Mod: CPTII,S$GLB,, | Performed by: STUDENT IN AN ORGANIZED HEALTH CARE EDUCATION/TRAINING PROGRAM

## 2025-04-29 PROCEDURE — 99999 PR PBB SHADOW E&M-EST. PATIENT-LVL III: CPT | Mod: PBBFAC,,, | Performed by: STUDENT IN AN ORGANIZED HEALTH CARE EDUCATION/TRAINING PROGRAM

## 2025-04-29 PROCEDURE — 99214 OFFICE O/P EST MOD 30 MIN: CPT | Mod: S$GLB,,, | Performed by: STUDENT IN AN ORGANIZED HEALTH CARE EDUCATION/TRAINING PROGRAM

## 2025-04-29 NOTE — PROGRESS NOTES
Chronic Pain - f/u    Referring Physician: No ref. provider found    Date: 04/29/2025     Re: Bryant King  MR#: [id number]  YOB: 1950  Age: 74 y.o.    Chief Complaint: back/leg  No chief complaint on file.    **This note is dictated using the M*Modal Fluency Direct word recognition program. There are word recognition mistakes that are occasionally missed on review.**    ASSESSMENT: 74 y.o. year old male with back and leg pain, consistent with     1. Lumbar radiculopathy        2. Postlaminectomy syndrome of lumbar region        3. Spinal stenosis of lumbar region with neurogenic claudication          PLAN:     Post laminectomy syndrome with right leg pain/radiculopathy  -MRI reviewed with patient.  He has several significant findings that are probably causing the pain/weakness that he is complaining of in the right hip.  He has severe foraminal stenosis at L4-5 and L5-S1, severe spinal stenosis at both levels, and a cyst at L5-S1 that is probably compressing the spinal cord.   -Neurosurgery referral to discuss options  1/7/25 - caudal NATAN w/cath (toward the right - low volume to L5) - no sed - ASA ok - 60%@3.5m  -repeat caudal NATAN as needed  -CONTINUE methocarbamol 500-1000mg BID-QID  -continue HEP and stretching program    Right hip abductor weakness  -MRI hip shows mild arthritis and glut muscle atrophy  -glut muscle atrophy likely related to severe stenosis in the back  -suspect this is coming from the back    - RTC 4-5 months  - Counseled patient regarding the importance of activity modification and physical therapy.    The above plan and management options were discussed at length with patient. Patient is in agreement with the above and verbalized understanding. It will be communicated with the referring physician via electronic record, fax, or mail.  Lab/study reports reviewed were important and necessary because subsequent medical and treatment recommendations required review of the above  "lab/study reports. Images viewed/reviewed above were important and necessary because subsequent medical and treatment recommendations required review of the reviewed image(s).     Electronically signed by:  Micheal Gamble DO  04/29/2025    =========================================================================================================    SUBJECTIVE:    Interval History 4/29/2025:   Bryant King is a 74 y.o. male presents to the clinic for follow up.  Since last visit the pain has has significantly improved.      The pain is located in the lower back and right buttocks area and radiates to the right toes.  The pain is described as aching    At BEST  0/10   At WORST  7/10 on the WORST day.    On average pain is rated as 5/10.   Today the pain is rated as 3/10  Symptoms interfere with activity.   Exacerbating factors: Walking.    Mitigating factors ice, massage, and medications.     Current pain medications: gabapentin 600mg qam and 300mg qpm, fish oil, methocarbamol 500-1000mg BID  Failed Pain Medications: cannot take NSAIDs due to heart    Pain procedures:  1/7/25 - caudal NATAN w/cath (toward the right - low volume to L5) - no sed - ASA ok - 60%@3.5m    Interval History 2/20/2025:   Bryant King is a 74 y.o. male presents to the clinic for follow up.  Since last visit the pain has has slightly improved.  The patient saw Dr. Boss and was recommended to have surgery. He feels like the last caudal was really helpful for the back pain but the right glut "tightness" is the worst area.  He continues to stretch a lot and work on the core.    The pain is located in the right buttocks/ low back area and radiates to the right hip .  The pain is described as aching and sharp    At BEST  5/10   At WORST  8/10 on the WORST day.    On average pain is rated as 5/10.   Today the pain is rated as 5/10  Symptoms interfere with daily activity.   Exacerbating factors: Sitting and Walking.    Mitigating " factors nothing.     Interval History 12/11/2024:   Bryant King is a 74 y.o. male presents to the clinic for follow up.  Since last visit the pain has is unchanged.  His pain is mostly in the right buttocks that is worse with ambulation.  Better with rest.    The pain is located in the right buttocks/low back area and radiates to the right hip .  The pain is described as aching and sharp    At BEST  5/10   At WORST  8/10 on the WORST day.    On average pain is rated as 5/10.   Today the pain is rated as 6/10  Symptoms interfere with daily activity.   Exacerbating factors: Sitting and Walking.    Mitigating factors nothing.     Initial hx:  Bryant King is a 74 y.o. male presents to the clinic for the evaluation of lower right back pain. The pain started 3-4 years ago following no inciting event and symptoms have been worsening.  The patient has had backpain since he was 15 when he had to have a fusion.  He states that the back pain has been getting worse.  He completed PT about 2 months ago which helps some.  The right side just never relaxes.  He cannot play golf without stopping every 1-2 holes to stretch.  The pain is not excruciating, but very aggravating.  He does not have much pain with sitting.  Not much pain with laying down. But he does wake up in the middle of the night to stretch. It hurts the most when he is walking.    Pain Description:    The pain is located in the lower back right side area and radiates to the right leg and foot .    At BEST  5/10   At WORST  8/10 on the WORST day.    On average pain is rated as 5/10.   Today the pain is rated as 5/10  The pain is continuous.  The pain is described as aching, tight band, and stiffness, numbness     Symptoms interfere with daily activity.   Exacerbating factors: Sitting.    Mitigating factors nothing.   He reports 6-7 hours of sleep per night.    Physical Therapy/Home Exercise: No, not currently in physical therapy or home exercise  program    Current Pain Medications:    - gabapentin 600mg qam and 300mg qpm, fish oil    Failed Pain Medications:    - cannot take NSAIDs due to heart    Pain Treatment Therapies:    Pain procedures: has had something but not sure  Physical Therapy: yes  Chiropractor: none  Acupuncture: none  TENS unit: none  Spinal decompression:   Fusion surgery when he was 15years old. Maybe L5  Joint replacement: none    Patient denies urinary incontinence and bowel incontinence.  Patient denies any suicidal or homicidal ideations     report:  Reviewed and consistent with medication use as prescribed.    Imaging:   XR lumbar 2019:    Slight convex right curvature lumbar spine there is grade 1 retrolisthesis of L3 on L4 and grade 1 anterolisthesis of L4 on L5.  No evidence for spondylolysis.  There is scattered endplate degeneration allowing for degenerative change the lumbar vertebral body heights and contours are within normal is without evidence for acute fracture.  Tortuous atherosclerotic aorta.  Further evaluation as warranted clinically..         2/20/2025     9:43 AM 11/14/2024     3:56 PM   Pain Disability Index (PDI)   Family/Home Responsibilities: 8 5   Recreation: 8 5   Occupation: 8 5   Sexual Behavior: 8 5   Self Care: 8 5   Life-Support Activities: 8 5   Pain Disability Index (PDI) 56 35        Past Medical History:   Diagnosis Date    Coronary artery disease     Hyperlipidemia     Hypertension     Migraines     Type 2 diabetes mellitus without complication, without long-term current use of insulin      Past Surgical History:   Procedure Laterality Date    AORTIC VALVE REPLACEMENT      back fusion      CORONARY ARTERY BYPASS GRAFT  02/08/2012    EPIDURAL STEROID INJECTION INTO LUMBAR SPINE N/A 1/7/2025    Procedure: caudal NATAN w/cath (toward the right - low volume to L5);  Surgeon: Micheal Gamble DO;  Location: [facility];  Service: Pain Management;  Laterality: N/A;  ASA ok    HERNIA REPAIR       umbilical hernia repair with possible mesh    laparoscopic hiatal hernia repair with mesh      PATELLA FRACTURE SURGERY      TONSILLECTOMY       Social History     Socioeconomic History    Marital status:    Tobacco Use    Smoking status: Never     Passive exposure: Never    Smokeless tobacco: Never   Substance and Sexual Activity    Alcohol use: No     Social Drivers of Health     Financial Resource Strain: Low Risk  (11/5/2024)    Overall Financial Resource Strain (CARDIA)     Difficulty of Paying Living Expenses: Not hard at all   Food Insecurity: No Food Insecurity (11/5/2024)    Hunger Vital Sign     Worried About Running Out of Food in the Last Year: Never true     Ran Out of Food in the Last Year: Never true   Physical Activity: Sufficiently Active (11/5/2024)    Exercise Vital Sign     Days of Exercise per Week: 4 days     Minutes of Exercise per Session: 60 min   Stress: No Stress Concern Present (11/5/2024)    [employer] - Occupational Stress Questionnaire     Feeling of Stress : Not at all   Housing Stability: Unknown (11/5/2024)    Housing Stability Vital Sign     Unable to Pay for Housing in the Last Year: No     Family History   Problem Relation Name Age of Onset    Hypertension Mother      Hypertension Father      Heart disease Brother      Heart attack Neg Hx         Review of patient's allergies indicates:  No Known Allergies    Current Outpatient Medications   Medication Sig    ALPRAZolam (XANAX) 0.5 MG tablet Take 0.5 mg by mouth 2 (two) times daily as needed.    amoxicillin (AMOXIL) 500 MG Tab TAKE 4 TABLETS (2000 MG) 1 HOUR PRIOR TO DENTAL WORK AS NEEDED    atorvastatin (LIPITOR) 80 MG tablet TAKE 1 TABLET EVERY DAY    carvediloL (COREG) 12.5 MG tablet TAKE 1 TABLET TWICE DAILY OR AS DIRECTED    ciclopirox (LOPROX) 0.77 % Crea Apply topically.    ezetimibe (ZETIA) 10 mg tablet TAKE 1 TABLET EVERY DAY    fluticasone propionate (FLONASE) 50 mcg/actuation  nasal spray 1 spray by Nasal route daily as needed.    gabapentin (NEURONTIN) 300 MG capsule TAKE 2 CAPSULES BY MOUTH 3 TIMES A DAY    glimepiride (AMARYL) 2 MG tablet TAKE 1 TAB PO IN THE AM, TAKE 2 TABS PO IN THE PM    JARDIANCE 10 mg tablet TAKE 1 TABLET EVERY DAY    methocarbamoL (ROBAXIN) 500 MG Tab Take 2 tablets (1,000 mg total) by mouth 2 (two) times daily as needed (pain and spasms).    nitroGLYCERIN (NITROSTAT) 0.4 MG SL tablet DISSOLVE 1 TABLET UNDER THE TONGUE EVERY 5 MINUTES AS NEEDED FOR CHEST PAIN AS DIRECTED    omega-3 fatty acids/fish oil (FISH OIL-OMEGA-3 FATTY ACIDS) 300-1,000 mg capsule Take by mouth once daily.    rizatriptan (MAXALT) 10 MG tablet Take 10 mg by mouth as needed for Migraine.    aspirin (ECOTRIN) 81 MG EC tablet Take 1 tablet (81 mg total) by mouth once daily.     Current Facility-Administered Medications   Medication    acetaminophen tablet 650 mg    albuterol inhaler 2 puff    diphenhydrAMINE injection 25 mg    EPINEPHrine (EPIPEN) 0.3 mg/0.3 mL pen injection 0.3 mg    methylPREDNISolone sodium succinate injection 40 mg    ondansetron disintegrating tablet 4 mg    sodium chloride 0.9% 500 mL flush bag    sodium chloride 0.9% flush 10 mL       REVIEW OF SYSTEMS:    GENERAL:  No weight loss, malaise or fevers.:+weight loss  HEENT:   No recent changes in vision or hearing:NO  NECK:  Negative for lumps, no difficulty with swallowing.:NO  RESPIRATORY:  Negative for cough, wheezing or shortness of breath, patient denies any recent URI.:NO  CARDIOVASCULAR:  Negative for chest pain, leg swelling or palpitations.:+leg swelling  GI:  Negative for abdominal discomfort, blood in stools or black stools or change in bowel habits.:NO  MUSCULOSKELETAL:  See HPI.  SKIN:  Negative for lesions, rash, and itching.:NO  PSYCH:  No mood disorder or recent psychosocial stressors.  Patients sleep is not disturbed secondary to pain.:NO  HEMATOLOGY/LYMPHOLOGY:  Negative for prolonged bleeding, bruising  "easily or swollen nodes.  Patient is not currently taking any anti-coagulants:+low dose aspirin  NEURO:   No history of headaches, syncope, paralysis, seizures or tremors.:+migraine  All other reviewed and negative other than HPI.    OBJECTIVE:    /82 (BP Location: Right arm, Patient Position: Sitting)   Pulse (!) 51   Ht 5' 8" (1.727 m)   Wt 83 kg (182 lb 15.7 oz)   BMI 27.82 kg/m²     PHYSICAL EXAMINATION:    GENERAL: Well appearing, in no acute distress, alert and oriented x3.  PSYCH:  Mood and affect appropriate.  SKIN: Skin color, texture, turgor normal, no rashes or lesions.  HEAD/FACE:  Normocephalic, atraumatic. Cranial nerves grossly intact.    CV: RRR with palpation of the radial artery.  PULM: CTAB. No evidence of respiratory difficulty, symmetric chest rise.  GI:  Soft and non-tender.    BACK:   - No obvious deformity or signs of trauma, Normal lumbar lordotic curve  - Negative spinous process tenderness  - Negative paravertebral tenderness  - Negative pain to palpation over the facet joints of the lumbar spine.   - Negative QL / Iliac crest / Glut tenderness  - Slump test is Negative for radicular pain  - Slump test is Negative for back pain  - Supine Straight leg raising is Positive for radicular pain to the right buttock  - Supine Straight leg raising is Negative for back pain  - Lumbar ROM is normal in Flexion without pain  - Lumbar ROM is normal in Extension without pain  - Lumbar ROM is normal in Lateral Flexion without pain  - Lumbar ROM is normal in Rotation without pain  - Negative Sustained Hip Flexion test (for discogenic pain)  - Positive Altered Gait, Posture  - Axial facet loading test Negative on the bilateral side(s)    SI Joint exam:  - Negative SI joint tenderness to palpation  - Moisés's sign Negative  - Yeoman's Test: Did not perform for SI joint pain indicating anterior SI ligament involvement. Did not perform for anterior thigh pain/paresthesia which indicates femoral " nerve stretch.  - Gaenslen's Test:Negative  - Finger Kirstie's Sign:Negative  - SI compression test:Negative  - SI distraction test:Negative  - Thigh Thrust: Negative  - SI Thrust: Negative    MUSKULOSKELETAL:    EXTREMITIES:   Hip Exam:  - Log Roll Negative  - FADIR Negative  - Stinchfield Negative  - Hip Scour Negative  - GTB Tenderness Negative      MUSCULOSKELETAL:  No atrophy or tone abnormalities are noted in the UE or LE.  No deformities, edema, or skin discoloration are noted on visible skin. Good capillary refill.    NEURO: Bilateral upper and lower extremity coordination and muscle stretch reflexes are physiologic and symmetric.      NEUROLOGICAL EXAM:  MENTAL STATUS: A x O x 3, good concentration, speech is fluent and goal directed  MEMORY: recent and remote are intact  CN: CN2-12 grossly intact  MOTOR: 5/5 in all muscle groups  DTRs: 2+ intact symmetric  Sensation:    -no Loss of sensation in a left lower and right lower  leg  distribution.    GAIT: normal.

## 2025-05-12 ENCOUNTER — HOSPITAL ENCOUNTER (EMERGENCY)
Facility: HOSPITAL | Age: 75
Discharge: HOME | End: 2025-05-12
Attending: EMERGENCY MEDICINE
Payer: MEDICARE

## 2025-05-12 VITALS
TEMPERATURE: 98 F | DIASTOLIC BLOOD PRESSURE: 89 MMHG | WEIGHT: 180 LBS | OXYGEN SATURATION: 98 % | HEART RATE: 56 BPM | HEIGHT: 68 IN | BODY MASS INDEX: 27.28 KG/M2 | SYSTOLIC BLOOD PRESSURE: 152 MMHG | RESPIRATION RATE: 18 BRPM

## 2025-05-12 DIAGNOSIS — M54.16 LUMBAR RADICULOPATHY: Primary | ICD-10-CM

## 2025-05-12 DIAGNOSIS — R11.10 VOMITING: ICD-10-CM

## 2025-05-12 LAB
ABSOLUTE EOSINOPHIL (OHS): 0.06 K/UL
ABSOLUTE MONOCYTE (OHS): 0.52 K/UL (ref 0.3–1)
ABSOLUTE NEUTROPHIL COUNT (OHS): 5.81 K/UL (ref 1.8–7.7)
ALBUMIN SERPL BCP-MCNC: 4 G/DL (ref 3.5–5.2)
ALP SERPL-CCNC: 64 UNIT/L (ref 40–150)
ALT SERPL W/O P-5'-P-CCNC: 41 UNIT/L (ref 10–44)
ANION GAP (OHS): 7 MMOL/L (ref 8–16)
AST SERPL-CCNC: 30 UNIT/L (ref 11–45)
BACTERIA #/AREA URNS AUTO: NORMAL /HPF
BASOPHILS # BLD AUTO: 0.04 K/UL
BASOPHILS NFR BLD AUTO: 0.5 %
BILIRUB SERPL-MCNC: 0.7 MG/DL (ref 0.1–1)
BILIRUB UR QL STRIP.AUTO: NEGATIVE
BUN SERPL-MCNC: 16 MG/DL (ref 8–23)
CALCIUM SERPL-MCNC: 8.9 MG/DL (ref 8.7–10.5)
CHLORIDE SERPL-SCNC: 103 MMOL/L (ref 95–110)
CLARITY UR: CLEAR
CO2 SERPL-SCNC: 29 MMOL/L (ref 23–29)
COLOR UR AUTO: YELLOW
CREAT SERPL-MCNC: 0.9 MG/DL (ref 0.5–1.4)
ERYTHROCYTE [DISTWIDTH] IN BLOOD BY AUTOMATED COUNT: 11.8 % (ref 11.5–14.5)
GFR SERPLBLD CREATININE-BSD FMLA CKD-EPI: >60 ML/MIN/1.73/M2
GLUCOSE SERPL-MCNC: 130 MG/DL (ref 70–110)
GLUCOSE UR QL STRIP: ABNORMAL
HCT VFR BLD AUTO: 48.2 % (ref 40–54)
HCV AB SERPL QL IA: NORMAL
HGB BLD-MCNC: 15.7 GM/DL (ref 14–18)
HGB UR QL STRIP: NEGATIVE
HIV 1+2 AB+HIV1 P24 AG SERPL QL IA: NORMAL
HOLD SPECIMEN: NORMAL
HOLD SPECIMEN: NORMAL
IMM GRANULOCYTES # BLD AUTO: 0.03 K/UL (ref 0–0.04)
IMM GRANULOCYTES NFR BLD AUTO: 0.4 % (ref 0–0.5)
KETONES UR QL STRIP: NEGATIVE
LEUKOCYTE ESTERASE UR QL STRIP: NEGATIVE
LYMPHOCYTES # BLD AUTO: 1.39 K/UL (ref 1–4.8)
MCH RBC QN AUTO: 31.2 PG (ref 27–31)
MCHC RBC AUTO-ENTMCNC: 32.6 G/DL (ref 32–36)
MCV RBC AUTO: 96 FL (ref 82–98)
MICROSCOPIC COMMENT: NORMAL
NITRITE UR QL STRIP: NEGATIVE
NUCLEATED RBC (/100WBC) (OHS): 0 /100 WBC
OHS QRS DURATION: 118 MS
OHS QTC CALCULATION: 417 MS
PH UR STRIP: 5 [PH]
PLATELET # BLD AUTO: 141 K/UL (ref 150–450)
PMV BLD AUTO: 10.5 FL (ref 9.2–12.9)
POTASSIUM SERPL-SCNC: 4.2 MMOL/L (ref 3.5–5.1)
PROT SERPL-MCNC: 7.2 GM/DL (ref 6–8.4)
PROT UR QL STRIP: NEGATIVE
RBC # BLD AUTO: 5.03 M/UL (ref 4.6–6.2)
RBC #/AREA URNS AUTO: 1 /HPF (ref 0–4)
RELATIVE EOSINOPHIL (OHS): 0.8 %
RELATIVE LYMPHOCYTE (OHS): 17.7 % (ref 18–48)
RELATIVE MONOCYTE (OHS): 6.6 % (ref 4–15)
RELATIVE NEUTROPHIL (OHS): 74 % (ref 38–73)
SODIUM SERPL-SCNC: 139 MMOL/L (ref 136–145)
SP GR UR STRIP: 1.02
UROBILINOGEN UR STRIP-ACNC: NEGATIVE EU/DL
WBC # BLD AUTO: 7.85 K/UL (ref 3.9–12.7)
WBC #/AREA URNS AUTO: 1 /HPF (ref 0–5)
YEAST UR QL AUTO: NORMAL /HPF

## 2025-05-12 PROCEDURE — 63600175 PHARM REV CODE 636 W HCPCS: Performed by: EMERGENCY MEDICINE

## 2025-05-12 PROCEDURE — 86803 HEPATITIS C AB TEST: CPT | Performed by: PHYSICIAN ASSISTANT

## 2025-05-12 PROCEDURE — 96375 TX/PRO/DX INJ NEW DRUG ADDON: CPT

## 2025-05-12 PROCEDURE — 81003 URINALYSIS AUTO W/O SCOPE: CPT | Performed by: EMERGENCY MEDICINE

## 2025-05-12 PROCEDURE — 93005 ELECTROCARDIOGRAM TRACING: CPT

## 2025-05-12 PROCEDURE — 82040 ASSAY OF SERUM ALBUMIN: CPT | Performed by: EMERGENCY MEDICINE

## 2025-05-12 PROCEDURE — 93010 ELECTROCARDIOGRAM REPORT: CPT | Mod: ,,, | Performed by: INTERNAL MEDICINE

## 2025-05-12 PROCEDURE — 85025 COMPLETE CBC W/AUTO DIFF WBC: CPT | Performed by: EMERGENCY MEDICINE

## 2025-05-12 PROCEDURE — 99285 EMERGENCY DEPT VISIT HI MDM: CPT | Mod: 25

## 2025-05-12 PROCEDURE — 87389 HIV-1 AG W/HIV-1&-2 AB AG IA: CPT | Performed by: PHYSICIAN ASSISTANT

## 2025-05-12 PROCEDURE — 96374 THER/PROPH/DIAG INJ IV PUSH: CPT

## 2025-05-12 RX ORDER — MORPHINE SULFATE 4 MG/ML
4 INJECTION, SOLUTION INTRAMUSCULAR; INTRAVENOUS
Refills: 0 | Status: COMPLETED | OUTPATIENT
Start: 2025-05-12 | End: 2025-05-12

## 2025-05-12 RX ORDER — ONDANSETRON HYDROCHLORIDE 2 MG/ML
4 INJECTION, SOLUTION INTRAVENOUS
Status: COMPLETED | OUTPATIENT
Start: 2025-05-12 | End: 2025-05-12

## 2025-05-12 RX ORDER — KETOROLAC TROMETHAMINE 30 MG/ML
10 INJECTION, SOLUTION INTRAMUSCULAR; INTRAVENOUS
Status: COMPLETED | OUTPATIENT
Start: 2025-05-12 | End: 2025-05-12

## 2025-05-12 RX ADMIN — KETOROLAC TROMETHAMINE 10 MG: 30 INJECTION, SOLUTION INTRAMUSCULAR; INTRAVENOUS at 01:05

## 2025-05-12 RX ADMIN — MORPHINE SULFATE 4 MG: 4 INJECTION INTRAVENOUS at 01:05

## 2025-05-12 RX ADMIN — ONDANSETRON 4 MG: 2 INJECTION INTRAMUSCULAR; INTRAVENOUS at 01:05

## 2025-05-12 NOTE — ED PROVIDER NOTES
Encounter Date: 5/12/2025       History     Chief Complaint   Patient presents with    Flank Pain     Right flank pain, tingling right leg, vomiting     Bryant King 74-year-old male with a history of aortic valve replacement, CAD, hypertension, hyperlipidemia, diabetes, lumbar radiculopathy s/p NAATN 01/07/2025 presenting today for sudden onset, severe, right lower back pain/flank with radiation to the groin.  Started abruptly at 7:00 a.m.  Pain is 10 in 10, constant and was associated with 1 episode of vomiting and diaphoresis.  Does report tingling down the extremity, states he has had that in the past.  No new weakness or numbness of the right lower extremity.  States he has back problems, this feels different.  He had a brief episode of similar pain yesterday but resolved without intervention and was able [place].  He tried stretching and doing back exercises today without relief.  He was concerned when he started to vomit in the pain did not resolve.  No chest pain or shortness of breath.  No urinary symptoms, fever or chills.  No hematuria.  No bladder or bowel incontinence.  No saddle anesthesia.  Ambulating without weakness        Review of patient's allergies indicates:  No Known Allergies  Past Medical History:   Diagnosis Date    Coronary artery disease     Hyperlipidemia     Hypertension     Migraines     Type 2 diabetes mellitus without complication, without long-term current use of insulin      Past Surgical History:   Procedure Laterality Date    AORTIC VALVE REPLACEMENT      back fusion      CORONARY ARTERY BYPASS GRAFT  02/08/2012    EPIDURAL STEROID INJECTION INTO LUMBAR SPINE N/A 1/7/2025    Procedure: caudal NATAN w/cath (toward the right - low volume to L5);  Surgeon: Micheal Gamble DO;  Location: [facility];  Service: Pain Management;  Laterality: N/A;  ASA ok    HERNIA REPAIR      umbilical hernia repair with possible mesh    laparoscopic hiatal  hernia repair with mesh      PATELLA FRACTURE SURGERY      TONSILLECTOMY       Family History   Problem Relation Name Age of Onset    Hypertension Mother      Hypertension Father      Heart disease Brother      Heart attack Neg Hx       Social History[1]  Review of Systems    Physical Exam     Initial Vitals [05/12/25 1155]   BP Pulse Resp Temp SpO2   (!) 203/93 61 16 97.8 °F (36.6 °C) 97 %      MAP       --         Physical Exam    Nursing note and vitals reviewed.  Constitutional: He appears well-developed and well-nourished. No distress.   HENT: Mouth/Throat: Oropharynx is clear and moist.   Eyes: Conjunctivae are normal.   Neck: Neck supple.   Cardiovascular:  Normal rate, regular rhythm and intact distal pulses.           + DP pulse   Pulmonary/Chest: Breath sounds normal. He has no wheezes. He has no rales.   Abdominal: Abdomen is soft. Bowel sounds are normal. There is no abdominal tenderness.   Musculoskeletal:         General: No edema.      Cervical back: Normal and neck supple.      Thoracic back: Normal.      Lumbar back: No bony tenderness. Negative right straight leg raise test and negative left straight leg raise test.        Back:      Lymphadenopathy:     He has no cervical adenopathy.   Neurological: He is alert and oriented to person, place, and time. He has normal strength. No sensory deficit.   Hip, knee, ankle flexion and extension intact.  5/5 strength bilateral lower extremities   Skin: No rash noted.   Psychiatric: He has a normal mood and affect.         ED Course   Procedures  Labs Reviewed   COMPREHENSIVE METABOLIC PANEL - Abnormal       Result Value    Sodium 139      Potassium 4.2      Chloride 103      CO2 29      Glucose 130 (*)     BUN 16      Creatinine 0.9      Calcium 8.9      Protein Total 7.2      Albumin 4.0      Bilirubin Total 0.7      ALP 64      AST 30      ALT 41      Anion Gap 7 (*)     eGFR >60     URINALYSIS, REFLEX TO URINE CULTURE - Abnormal    Color, UA Yellow       Appearance, UA Clear      pH, UA 5.0      Spec Grav UA 1.025      Protein, UA Negative      Glucose, UA 4+ (*)     Ketones, UA Negative      Bilirubin, UA Negative      Blood, UA Negative      Nitrites, UA Negative      Urobilinogen, UA Negative      Leukocyte Esterase, UA Negative     CBC WITH DIFFERENTIAL - Abnormal    WBC 7.85      RBC 5.03      HGB 15.7      HCT 48.2      MCV 96      MCH 31.2 (*)     MCHC 32.6      RDW 11.8      Platelet Count 141 (*)     MPV 10.5      Nucleated RBC 0      Neut % 74.0 (*)     Lymph % 17.7 (*)     Mono % 6.6      Eos % 0.8      Basophil % 0.5      Imm Grans % 0.4      Neut # 5.81      Lymph # 1.39      Mono # 0.52      Eos # 0.06      Baso # 0.04      Imm Grans # 0.03     HEPATITIS C ANTIBODY - Normal    Hep C Ab Interp Non-Reactive     HIV 1 / 2 ANTIBODY - Normal    HIV 1/2 Ag/Ab Non-Reactive     CBC W/ AUTO DIFFERENTIAL    Narrative:     The following orders were created for panel order CBC auto differential.  Procedure                               Abnormality         Status                     ---------                               -----------         ------                     CBC with Differential[[id number]]       Abnormal            Final result                 Please view results for these tests on the individual orders.   HEP C VIRUS HOLD SPECIMEN    Extra Tube Hold for add-ons.     GREY TOP URINE HOLD    Extra Tube Hold for add-ons.     URINALYSIS MICROSCOPIC    RBC, UA 1      WBC, UA 1      Bacteria, UA Rare      Yeast, UA None      Microscopic Comment         EKG Readings: (Independently Interpreted)   EKG:  Sinus bradycardia at 55, left atrial enlargement, right axis, intraventricular conduction delay, no STEMI     ECG Results              EKG 12-lead (Final result)        Collection Time Result Time QRS Duration OHS QTC Calculation    05/12/25 13:10:20 05/12/25 14:23:00 118 417                     Final result by Interface, Lab In [facility] (05/12/25 14:23:06)                    Narrative:    Test Reason : R11.10,    Vent. Rate :  55 BPM     Atrial Rate :  55 BPM     P-R Int : 194 ms          QRS Dur : 118 ms      QT Int : 436 ms       P-R-T Axes :  27 112  25 degrees    QTcB Int : 417 ms    Sinus bradycardia  Possible Left atrial enlargement  Right axis deviation  Nonspecific intraventricular conduction delay  Abnormal ECG  When compared with ECG of 06-Nov-2024 09:20,  The axis Shifted right  Confirmed by Moisés Aguero ([id number]) on 5/12/2025 2:22:56 PM    Referred By: AAAREFERRAL SELF           Confirmed By: Moisés Aguero                                  Imaging Results              CT Renal Stone Study ABD Pelvis WO (Final result)  Result time 05/12/25 14:48:40      Final result by Maulik Ta IV, MD (05/12/25 14:48:40)                   Impression:      Punctate left nonobstructing nephroliths.  No hydronephrosis.    Additional findings as detailed in the body of the report.    Electronically signed by resident: John Yin  Date:    05/12/2025  Time:    14:13    Electronically signed by: Maulik Ta  Date:    05/12/2025  Time:    14:48               Narrative:    EXAMINATION:  CT RENAL STONE STUDY ABD PELVIS WO    CLINICAL HISTORY:  Flank pain, kidney stone suspected    TECHNIQUE:  Axial CT images of the abdomen and pelvis were obtained via helical, multi detector CT technique.  No intravenous contrast material was administered.    COMPARISON:  None    FINDINGS:  Heart: Status post CABG, aortic valve replacement.  Sternotomy wires.  No cardiomegaly or pericardial effusion.  Coronary artery calcific atherosclerosis.    Lung Bases: Mild subsegmental atelectasis at the left lung base.    Liver: Normal in size.  Several hepatic hypodensities, most too small to characterize, though the largest is compatible with a cyst.    Gallbladder: No calcified gallstones.    Bile Ducts: No intra or extrahepatic biliary ductal dilation.    Pancreas: No pancreatic mass lesion  or duct dilatation.    Spleen:   Prominent splenic hilum fat. Otherwise, normal.    Adrenals: Normal.    Genitourinary: Normal in size and location.  Punctate left nonobstructing nephroliths.  No hydroureteronephrosis.  Bladder demonstrates smooth contours without bladder wall thickening.    Reproductive organs: Mild prostatomegaly.    GI tract/Mesentery: Operative change at the gastroesophageal junction.  Small hiatal hernia.  No evidence for bowel obstruction or inflammation.  Colonic diverticulosis.  Moderate colonic stool burden.    Peritoneal Space: No abdominopelvic ascites or intraperitoneal free air.    Lymph nodes: No significant adenopathy.    Abdominal wall: Subcutaneous calcification over the left flank, possibly injection related.    Vasculature: Minimally ectatic caliber of the infrarenal abdominal aorta.  Mild aortoiliac calcific atherosclerosis.    Bones: Degenerative change without acute displaced fracture or bony destructive process.  Remote healed fracture of a right lower posterior rib.  Chronic bilateral L5 pars defects.                        Preliminary result by John Yni MD (05/12/25 14:26:06)                   Impression:      Punctate left nonobstructing nephroliths.  No hydronephrosis.    Additional findings as detailed in the body of the report.    Electronically signed by resident: John Yin  Date:    05/12/2025  Time:    14:13                 Narrative:    EXAMINATION:  CT RENAL STONE STUDY ABD PELVIS WO    CLINICAL HISTORY:  Flank pain, kidney stone suspected;    TECHNIQUE:  Axial CT images of the abdomen and pelvis were obtained via helical, multi detector CT technique.  No intravenous contrast material was administered.    COMPARISON:  None    FINDINGS:  Heart: Status post CABG, aortic valve replacement.  Sternotomy wires.  No cardiomegaly or pericardial effusion.  Coronary artery calcific atherosclerosis.    Lung Bases: Mild subsegmental atelectasis at the left lung  base.    Liver: Normal in size.  Several hepatic hypodensities, most too small to characterize, though the largest is compatible with a cyst.    Gallbladder: No calcified gallstones.    Bile Ducts: No intra or extrahepatic biliary ductal dilation.    Pancreas: No pancreatic mass lesion or duct dilatation.    Spleen:   Prominent splenic hilum fat. Otherwise, normal.    Adrenals: Normal.    Genitourinary: Normal in size and location.  Punctate left nonobstructing nephroliths.  No hydroureteronephrosis.  Bladder demonstrates smooth contours without bladder wall thickening.    Reproductive organs: Mild prostatomegaly.    GI tract/Mesentery: Operative change at the gastroesophageal junction.  Small hiatal hernia.  No evidence for bowel obstruction or inflammation.  Colonic diverticulosis.  Moderate colonic stool burden.    Peritoneal Space: No abdominopelvic ascites or intraperitoneal free air.    Lymph nodes: No significant adenopathy.    Abdominal wall: Subcutaneous calcification over the left flank, possibly injection related.    Vasculature: Minimally ectatic caliber of the infrarenal abdominal aorta.  Mild aortoiliac calcific atherosclerosis.    Bones: Degenerative change without acute displaced fracture or bony destructive process.  Remote healed fracture of a right lower posterior rib.                                       Medications   morphine injection 4 mg (4 mg Intravenous Given 5/12/25 1338)   ondansetron injection 4 mg (4 mg Intravenous Given 5/12/25 1339)   ketorolac injection 9.999 mg (9.999 mg Intravenous Given 5/12/25 1339)     Medical Decision Making  Emergent evaluation a 74-year-old male presenting today with acute, severe flank/lower back pain radiating to the groin since 7:00 a.m. with associated vomiting and diaphoresis.    Vitals reviewed, hypertension noted.  He appears uncomfortable.    Differential includes but not limited to acute sciatica, lumbar radiculopathy, kidney stone, muscle spasm,  spinal stenosis, UTI, aortic pathology    Plan for pain control, CT        Amount and/or Complexity of Data Reviewed  External Data Reviewed: notes.     Details: MRI:  Impression:     Remote postoperative changes of L5.     Lumbar spondylosis, significant for severe spinal canal stenosis at L4-L5 and L5-S1 due to circumferential disc bulge at L4-L5, grade 2 anterolisthesis of L5-S1 and left facet synovial cyst at L5-S1.     Electronically signed by resident: Oscar Cleaning  Date:                                            12/02/2024  Time:                                           09:20     Electronically signed by:Alfredo Pérez MD  Date:                                            12/02/2024  Time:                                           10:24    Labs: ordered. Decision-making details documented in ED Course.  Radiology: ordered and independent interpretation performed.  ECG/medicine tests: ordered and independent interpretation performed.               ED Course as of 05/12/25 1744   Mon May 12, 2025   1354 WBC: 7.85 [GM]   1354 Hemoglobin: 15.7 [GM]   1354 Creatinine: 0.9 [GM]   1501 Impression:     Punctate left nonobstructing nephroliths.  No hydronephrosis.     Additional findings as detailed in the body of the report.     Electronically signed by resident: John Yin  Date:                                            05/12/2025  Time:                                           14:13     Electronically signed by:Maulik Ta  Date:                                            05/12/2025  Time:                                           14:48   [GM]   1743 Patient reports improvement of symptoms after treatment.  He is ambulatory.  Blood pressure improved.  I suspect he likely has worsening lumbar radiculopathy from lumbar stenosis, recommend follow-up with his pain management and Neurosurgery.    Patient given strict return precautions for red flag symptoms.  He expressed understanding.  Stable for discharge.  [GM]      ED Course User Index  [GM] Marcela Martino MD                           Clinical Impression:  Final diagnoses:  [R11.10] Vomiting  [M54.16] Lumbar radiculopathy (Primary)          ED Disposition Condition    Discharge Stable          ED Prescriptions    None       Follow-up Information       Follow up With Specialties Details Why Contact Info    Eric Biswas Jr., MD Internal Medicine Schedule an appointment as soon as possible for a visit   [address]  [facility] [address]  [phone]                 [1]   Social History  Tobacco Use    Smoking status: Never     Passive exposure: Never    Smokeless tobacco: Never   Substance Use Topics    Alcohol use: No        Marcela Martino MD  05/12/25 3545

## 2025-05-12 NOTE — DISCHARGE INSTRUCTIONS
The scan shows a small nonobstructing stone in the left kidney, this is not causing your symptoms.  Otherwise no acute issues in your abdomen and pelvis.  Your labs are reassuring.  I think this is likely radicular pain, please follow up with your neurosurgeon/back team

## 2025-05-12 NOTE — ED TRIAGE NOTES
Bryant King, a 74 y.o. male presents to the ED w/ complaint of flank pain. Pt arrives to E via personal vehicle with multiple complaints. Pt reports 8/10 flank pain onset yesterday, nausea and one episode of vomiting, and tingling to right leg.  Triage note:  Chief Complaint   Patient presents with    Flank Pain     Right flank pain, tingling right leg, vomiting     Review of patient's allergies indicates:  No Known Allergies  Past Medical History:   Diagnosis Date    Coronary artery disease     Hyperlipidemia     Hypertension     Migraines     Type 2 diabetes mellitus without complication, without long-term current use of insulin

## 2025-05-12 NOTE — FIRST PROVIDER EVALUATION
Medical screening examination initiated.  I have conducted a focused provider triage encounter, findings are as follows:    Brief history of present illness:  75 y/o m, c/o severe R LBP, radiating around to groin.  No urinary sx, no fever/chills, ? Numbness in R LE.      Pain onset yesterday and was mild, significant worsening this morning with n/v    There were no vitals filed for this visit.    Pertinent physical exam:  comfortable    Brief workup plan:  labs, CT abd/pelvis, pain meds    Preliminary workup initiated; this workup will be continued and followed by the physician or advanced practice provider that is assigned to the patient when roomed.

## 2025-05-15 ENCOUNTER — TELEPHONE (OUTPATIENT)
Dept: PAIN MEDICINE | Facility: CLINIC | Age: 75
End: 2025-05-15
Payer: MEDICARE

## 2025-05-15 DIAGNOSIS — M96.1 POSTLAMINECTOMY SYNDROME OF LUMBAR REGION: ICD-10-CM

## 2025-05-15 DIAGNOSIS — M54.16 LUMBAR RADICULOPATHY: Primary | ICD-10-CM

## 2025-05-15 RX ORDER — HYDROCODONE BITARTRATE AND ACETAMINOPHEN 5; 325 MG/1; MG/1
1 TABLET ORAL EVERY 8 HOURS PRN
Qty: 15 TABLET | Refills: 0 | Status: SHIPPED | OUTPATIENT
Start: 2025-05-15 | End: 2025-05-20

## 2025-05-15 NOTE — TELEPHONE ENCOUNTER
Sciatic pain has returned. Went to ED on Monday.  Going down the right leg again. Wants to repeat NATAN.  Will give some short term pain meds.     Micheal Barton

## 2025-05-16 ENCOUNTER — TELEPHONE (OUTPATIENT)
Dept: PAIN MEDICINE | Facility: CLINIC | Age: 75
End: 2025-05-16
Payer: MEDICARE

## 2025-05-16 DIAGNOSIS — M54.16 LUMBAR RADICULOPATHY: Primary | ICD-10-CM

## 2025-05-16 NOTE — TELEPHONE ENCOUNTER
----- Message from Micheal Hawk DO sent at 5/15/2025  5:32 PM CDT -----  Regarding: Order for ALEX RIBEIRO    Patient Name: ALEX RIBEIRO([id number])  Sex: Male  : 1950      PCP: LILLY SALAS JR    Center: [facility]     Types of orders made on 05/15/2025: Medications, Procedure Request    Order Date:5/15/2025  Ordering User:MICHEAL HAWK [[id number]]  Encounter Provider:Micheal Hawk DO [[id number]]  Authorizing Provider: Micheal Hawk DO [[id number]]  Department:[facility]IN [facility]NA[facility][[id number]]    Common Order Information  Procedure -> Epidural Injection (specify level)     Pre-op Diagnosis -> Lumbar radiculopathy     Order Specific Information  Order: Procedure Request Order for Pain Management [Custom: [id number]]  Order #:          [id number]: 1 FUTURE    Priority: Routine  Class: Clinic Performed    Future Order Information      Expires on:05/15/2026            Expected by:05/15/2025                   Comment:25 - caudal NATAN w/cath (toward the right - low volume to L5) -             no sed - ASA ok    Associated Diagnoses      M54.16 Lumbar radiculopathy      M96.1 Postlaminectomy syndrome of lumbar region      Physician -> allynu         Is patient on anti-coagulants? -> No Cmt: asa ok        Facility Name: -> [facility]           Priority: Routine  Class: Clinic Performed    Future Order Information      Expires on:05/15/2026            Expected by:05/15/2025                   Comment:25 - caudal NATAN w/cath (toward the right - low volume to L5) -             no sed - ASA ok    Associated Diagnoses      M54.16 Lumbar radiculopathy      M96.1 Postlaminectomy syndrome of lumbar region      Procedure -> Epidural Injection (specify level)         Physician -> [email or web address]         Is patient on anti-coagulants? -> No Cmt: asa ok        Pre-op Diagnosis -> Lumbar radiculopathy         Facility Name: -> [facility]

## 2025-05-22 ENCOUNTER — TELEPHONE (OUTPATIENT)
Dept: PAIN MEDICINE | Facility: CLINIC | Age: 75
End: 2025-05-22
Payer: MEDICARE

## 2025-05-23 DIAGNOSIS — M54.16 LUMBAR RADICULOPATHY: ICD-10-CM

## 2025-05-23 DIAGNOSIS — M48.062 SPINAL STENOSIS OF LUMBAR REGION WITH NEUROGENIC CLAUDICATION: ICD-10-CM

## 2025-05-23 DIAGNOSIS — M96.1 POSTLAMINECTOMY SYNDROME OF LUMBAR REGION: ICD-10-CM

## 2025-05-23 RX ORDER — METHOCARBAMOL 500 MG/1
1000 TABLET, FILM COATED ORAL 2 TIMES DAILY PRN
Qty: 120 TABLET | Refills: 2 | Status: SHIPPED | OUTPATIENT
Start: 2025-05-23 | End: 2025-08-21

## 2025-05-27 ENCOUNTER — TELEPHONE (OUTPATIENT)
Dept: PAIN MEDICINE | Facility: HOSPITAL | Age: 75
End: 2025-05-27
Payer: MEDICARE

## 2025-05-29 ENCOUNTER — HOSPITAL ENCOUNTER (OUTPATIENT)
Facility: HOSPITAL | Age: 75
Discharge: HOME | End: 2025-05-29
Attending: STUDENT IN AN ORGANIZED HEALTH CARE EDUCATION/TRAINING PROGRAM | Admitting: STUDENT IN AN ORGANIZED HEALTH CARE EDUCATION/TRAINING PROGRAM
Payer: MEDICARE

## 2025-05-29 VITALS
DIASTOLIC BLOOD PRESSURE: 78 MMHG | OXYGEN SATURATION: 96 % | RESPIRATION RATE: 14 BRPM | HEART RATE: 56 BPM | TEMPERATURE: 98 F | SYSTOLIC BLOOD PRESSURE: 138 MMHG

## 2025-05-29 DIAGNOSIS — M54.16 LUMBAR RADICULOPATHY: Primary | ICD-10-CM

## 2025-05-29 PROCEDURE — 62323 NJX INTERLAMINAR LMBR/SAC: CPT | Performed by: STUDENT IN AN ORGANIZED HEALTH CARE EDUCATION/TRAINING PROGRAM

## 2025-05-29 PROCEDURE — 62323 NJX INTERLAMINAR LMBR/SAC: CPT | Mod: ,,, | Performed by: STUDENT IN AN ORGANIZED HEALTH CARE EDUCATION/TRAINING PROGRAM

## 2025-05-29 PROCEDURE — 63600175 PHARM REV CODE 636 W HCPCS: Performed by: STUDENT IN AN ORGANIZED HEALTH CARE EDUCATION/TRAINING PROGRAM

## 2025-05-29 PROCEDURE — 25500020 PHARM REV CODE 255: Performed by: STUDENT IN AN ORGANIZED HEALTH CARE EDUCATION/TRAINING PROGRAM

## 2025-05-29 RX ORDER — LIDOCAINE HYDROCHLORIDE 20 MG/ML
INJECTION, SOLUTION EPIDURAL; INFILTRATION; INTRACAUDAL; PERINEURAL
Status: DISCONTINUED | OUTPATIENT
Start: 2025-05-29 | End: 2025-05-29 | Stop reason: HOSPADM

## 2025-05-29 RX ORDER — DEXAMETHASONE SODIUM PHOSPHATE 10 MG/ML
INJECTION, SOLUTION INTRA-ARTICULAR; INTRALESIONAL; INTRAMUSCULAR; INTRAVENOUS; SOFT TISSUE
Status: DISCONTINUED | OUTPATIENT
Start: 2025-05-29 | End: 2025-05-29 | Stop reason: HOSPADM

## 2025-05-30 LAB — POCT GLUCOSE: 160 MG/DL (ref 70–110)

## 2025-06-30 ENCOUNTER — PATIENT MESSAGE (OUTPATIENT)
Dept: PAIN MEDICINE | Facility: CLINIC | Age: 75
End: 2025-06-30
Payer: MEDICARE

## 2025-06-30 DIAGNOSIS — M96.1 POSTLAMINECTOMY SYNDROME OF LUMBAR REGION: Primary | ICD-10-CM

## 2025-06-30 DIAGNOSIS — M54.16 LUMBAR RADICULOPATHY: ICD-10-CM

## 2025-07-26 DIAGNOSIS — I25.10 CORONARY ARTERY DISEASE INVOLVING NATIVE CORONARY ARTERY OF NATIVE HEART WITHOUT ANGINA PECTORIS: ICD-10-CM

## 2025-07-26 DIAGNOSIS — Z95.1 S/P CABG X 1: ICD-10-CM

## 2025-07-26 DIAGNOSIS — E11.9 TYPE 2 DIABETES MELLITUS WITHOUT COMPLICATION, WITHOUT LONG-TERM CURRENT USE OF INSULIN: ICD-10-CM

## 2025-07-26 DIAGNOSIS — I10 HTN (HYPERTENSION), BENIGN: ICD-10-CM

## 2025-07-29 RX ORDER — EMPAGLIFLOZIN 10 MG/1
10 TABLET, FILM COATED ORAL
Qty: 90 TABLET | Refills: 3 | Status: SHIPPED | OUTPATIENT
Start: 2025-07-29

## 2025-08-20 RX ORDER — ATORVASTATIN CALCIUM 80 MG/1
80 TABLET, FILM COATED ORAL
Qty: 90 TABLET | Refills: 3 | Status: SHIPPED | OUTPATIENT
Start: 2025-08-20

## (undated) DEVICE — IRRIGATOR ENDOSCOPY DISP.

## (undated) DEVICE — TROCAR ENDOPATH XCEL 5MM 7.5CM

## (undated) DEVICE — LOOP VESSEL BLUE MAXI

## (undated) DEVICE — WARMER DRAPE STERILE LF

## (undated) DEVICE — ELECTRODE REM PLYHSV RETURN 9

## (undated) DEVICE — PACK LAPSCP/PELVSCPY III TIBRN

## (undated) DEVICE — BLADE SURG CARBON STEEL SZ11

## (undated) DEVICE — SOL NS 1000CC

## (undated) DEVICE — ADHESIVE MASTISOL VIAL 48/BX

## (undated) DEVICE — LUBRICANT SURGILUBE 2 OZ

## (undated) DEVICE — SUT GUT PL. 4-0 27 FS-2

## (undated) DEVICE — SUT ENDOLOOP PDSII 18 LIGA

## (undated) DEVICE — TROCAR ENDOPATH XCEL 5X100MM

## (undated) DEVICE — SEE MEDLINE ITEM 157131

## (undated) DEVICE — BANDAGE ADHESIVE

## (undated) DEVICE — TROCAR ENDOPATH XCEL 11MM 10CM

## (undated) DEVICE — NDL SPINAL SPINOCAN 22GX3.5

## (undated) DEVICE — SUT D SPECIAL

## (undated) DEVICE — NDL HYPO REG 25G X 1 1/2

## (undated) DEVICE — TRAY MINOR GEN SURG

## (undated) DEVICE — APPLICATOR CHLORAPREP ORN 26ML

## (undated) DEVICE — SUT D SPECIAL NUROLON 0 ENS

## (undated) DEVICE — CLOSURE SKIN STERI STRIP 1/2X4

## (undated) DEVICE — TROCAR ENDOPATH XCEL 12MM 10CM

## (undated) DEVICE — DRAPE STERI INSTRUMENT 1018

## (undated) DEVICE — SEE MEDLINE ITEM 152622

## (undated) DEVICE — TROCAR ENDOPATH ECEL

## (undated) DEVICE — SCISSOR 5MMX35CM DIRECT DRIVE

## (undated) DEVICE — SHEARS HARMONIC 5CM 36CM

## (undated) DEVICE — TUBING HF INSUFFLATION W/ FLTR